# Patient Record
Sex: FEMALE | Race: BLACK OR AFRICAN AMERICAN | Employment: OTHER | URBAN - METROPOLITAN AREA
[De-identification: names, ages, dates, MRNs, and addresses within clinical notes are randomized per-mention and may not be internally consistent; named-entity substitution may affect disease eponyms.]

---

## 2018-05-01 ENCOUNTER — HOSPITAL ENCOUNTER (EMERGENCY)
Age: 83
Discharge: HOME | End: 2018-05-01
Attending: EMERGENCY MEDICINE
Payer: MEDICARE

## 2018-05-01 ENCOUNTER — APPOINTMENT (OUTPATIENT)
Dept: GENERAL RADIOLOGY | Age: 83
End: 2018-05-01
Attending: EMERGENCY MEDICINE
Payer: MEDICARE

## 2018-05-01 VITALS
SYSTOLIC BLOOD PRESSURE: 185 MMHG | WEIGHT: 148 LBS | DIASTOLIC BLOOD PRESSURE: 60 MMHG | RESPIRATION RATE: 20 BRPM | BODY MASS INDEX: 27.07 KG/M2 | HEART RATE: 67 BPM | OXYGEN SATURATION: 100 % | TEMPERATURE: 98.2 F

## 2018-05-01 DIAGNOSIS — R60.0 PEDAL EDEMA: Primary | ICD-10-CM

## 2018-05-01 LAB
ALBUMIN SERPL-MCNC: 3.3 G/DL (ref 3.4–5)
ALBUMIN/GLOB SERPL: 0.7 {RATIO} (ref 0.8–1.7)
ALP SERPL-CCNC: 110 U/L (ref 45–117)
ALT SERPL-CCNC: 31 U/L (ref 13–56)
ANION GAP SERPL CALC-SCNC: 5 MMOL/L (ref 3–18)
AST SERPL-CCNC: 36 U/L (ref 15–37)
BASOPHILS # BLD: 0 K/UL (ref 0–0.06)
BASOPHILS NFR BLD: 0 % (ref 0–2)
BILIRUB SERPL-MCNC: 0.3 MG/DL (ref 0.2–1)
BNP SERPL-MCNC: 2132 PG/ML (ref 0–1800)
BUN SERPL-MCNC: 30 MG/DL (ref 7–18)
BUN/CREAT SERPL: 20 (ref 12–20)
CALCIUM SERPL-MCNC: 8.9 MG/DL (ref 8.5–10.1)
CHLORIDE SERPL-SCNC: 106 MMOL/L (ref 100–108)
CO2 SERPL-SCNC: 29 MMOL/L (ref 21–32)
CREAT SERPL-MCNC: 1.53 MG/DL (ref 0.6–1.3)
DIFFERENTIAL METHOD BLD: ABNORMAL
EOSINOPHIL # BLD: 0.1 K/UL (ref 0–0.4)
EOSINOPHIL NFR BLD: 2 % (ref 0–5)
ERYTHROCYTE [DISTWIDTH] IN BLOOD BY AUTOMATED COUNT: 16.1 % (ref 11.6–14.5)
GLOBULIN SER CALC-MCNC: 4.7 G/DL (ref 2–4)
GLUCOSE SERPL-MCNC: 103 MG/DL (ref 74–99)
HCT VFR BLD AUTO: 32.8 % (ref 35–45)
HGB BLD-MCNC: 10.7 G/DL (ref 12–16)
LYMPHOCYTES # BLD: 1.7 K/UL (ref 0.9–3.6)
LYMPHOCYTES NFR BLD: 24 % (ref 21–52)
MCH RBC QN AUTO: 27 PG (ref 24–34)
MCHC RBC AUTO-ENTMCNC: 32.6 G/DL (ref 31–37)
MCV RBC AUTO: 82.6 FL (ref 74–97)
MONOCYTES # BLD: 0.7 K/UL (ref 0.05–1.2)
MONOCYTES NFR BLD: 10 % (ref 3–10)
NEUTS SEG # BLD: 4.7 K/UL (ref 1.8–8)
NEUTS SEG NFR BLD: 64 % (ref 40–73)
PLATELET # BLD AUTO: 211 K/UL (ref 135–420)
PMV BLD AUTO: 10.1 FL (ref 9.2–11.8)
POTASSIUM SERPL-SCNC: 4.2 MMOL/L (ref 3.5–5.5)
PROT SERPL-MCNC: 8 G/DL (ref 6.4–8.2)
RBC # BLD AUTO: 3.97 M/UL (ref 4.2–5.3)
SODIUM SERPL-SCNC: 140 MMOL/L (ref 136–145)
WBC # BLD AUTO: 7.2 K/UL (ref 4.6–13.2)

## 2018-05-01 PROCEDURE — 85025 COMPLETE CBC W/AUTO DIFF WBC: CPT | Performed by: EMERGENCY MEDICINE

## 2018-05-01 PROCEDURE — 99284 EMERGENCY DEPT VISIT MOD MDM: CPT

## 2018-05-01 PROCEDURE — 93970 EXTREMITY STUDY: CPT

## 2018-05-01 PROCEDURE — 74011250637 HC RX REV CODE- 250/637: Performed by: EMERGENCY MEDICINE

## 2018-05-01 PROCEDURE — 83880 ASSAY OF NATRIURETIC PEPTIDE: CPT | Performed by: EMERGENCY MEDICINE

## 2018-05-01 PROCEDURE — 74011250636 HC RX REV CODE- 250/636: Performed by: EMERGENCY MEDICINE

## 2018-05-01 PROCEDURE — 80053 COMPREHEN METABOLIC PANEL: CPT | Performed by: EMERGENCY MEDICINE

## 2018-05-01 PROCEDURE — 71045 X-RAY EXAM CHEST 1 VIEW: CPT

## 2018-05-01 PROCEDURE — 96374 THER/PROPH/DIAG INJ IV PUSH: CPT

## 2018-05-01 RX ORDER — CARVEDILOL 12.5 MG/1
12.5 TABLET ORAL
Status: COMPLETED | OUTPATIENT
Start: 2018-05-01 | End: 2018-05-01

## 2018-05-01 RX ORDER — GUAIFENESIN 100 MG/5ML
81 LIQUID (ML) ORAL
Status: COMPLETED | OUTPATIENT
Start: 2018-05-01 | End: 2018-05-01

## 2018-05-01 RX ORDER — ACETAMINOPHEN 500 MG
1000 TABLET ORAL
Status: COMPLETED | OUTPATIENT
Start: 2018-05-01 | End: 2018-05-01

## 2018-05-01 RX ORDER — CLOPIDOGREL BISULFATE 75 MG/1
75 TABLET ORAL
Status: COMPLETED | OUTPATIENT
Start: 2018-05-01 | End: 2018-05-01

## 2018-05-01 RX ORDER — FUROSEMIDE 10 MG/ML
40 INJECTION INTRAMUSCULAR; INTRAVENOUS
Status: COMPLETED | OUTPATIENT
Start: 2018-05-01 | End: 2018-05-01

## 2018-05-01 RX ORDER — CLONIDINE HYDROCHLORIDE 0.1 MG/1
0.1 TABLET ORAL
Status: COMPLETED | OUTPATIENT
Start: 2018-05-01 | End: 2018-05-01

## 2018-05-01 RX ADMIN — CLOPIDOGREL BISULFATE 75 MG: 75 TABLET ORAL at 09:29

## 2018-05-01 RX ADMIN — CLONIDINE HYDROCHLORIDE 0.1 MG: 0.1 TABLET ORAL at 09:30

## 2018-05-01 RX ADMIN — FUROSEMIDE 40 MG: 10 INJECTION, SOLUTION INTRAMUSCULAR; INTRAVENOUS at 09:33

## 2018-05-01 RX ADMIN — ACETAMINOPHEN 1000 MG: 500 TABLET, FILM COATED ORAL at 09:30

## 2018-05-01 RX ADMIN — CARVEDILOL 12.5 MG: 12.5 TABLET, FILM COATED ORAL at 09:30

## 2018-05-01 RX ADMIN — ASPIRIN 81 MG 81 MG: 81 TABLET ORAL at 09:30

## 2018-05-01 NOTE — ED PROVIDER NOTES
EMERGENCY DEPARTMENT HISTORY AND PHYSICAL EXAM    9:13 AM      Date: 5/1/2018  Patient Name: Fran Shearer    History of Presenting Illness     Chief Complaint   Patient presents with    Leg Swelling         History Provided By: Patient, Patient's  and EMS    Chief Complaint: Leg Swelling and Pain  Duration:  Days  Timing:  Gradual and Worsening  Location: Bilateral lower extremities  Quality: Aching  Severity: Moderate  Modifying Factors: Made worse with use of compression boots over last ten days. Associated Symptoms: denies any other associated signs or symptoms      Additional History (Context): Fran Shearer is a 80 y.o. 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 Per EMS, patient was given compression boots for swelling 2 years ago, but never used them until 10 days ago. Her BLE pain increased after starting to use the boots. Patient's  reports that patient has been compliant with her medications, but has not yet taken her morning dose of BP medication. Per EMS, patient is ambulatory with a walker and dresses herself at baseline. Patient makes no report of associated chest pain, shortness of breath, nausea, vomiting, diarrhea, recent fever, chills, or diaphoresis. She makes no further complaints. History and ROS limited due to dementia. PCP: Barrie Heimlich, MD    Current Outpatient Prescriptions   Medication Sig Dispense Refill    carvedilol (COREG) 12.5 mg tablet TAKE 1 TABLET TWICE A DAY WITH MEALS 180 Tab 2    NAMENDA 10 mg tablet TAKE 1 TABLET TWICE A  Tab 5    donepezil (ARICEPT) 10 mg tablet TAKE 1 TABLET NIGHTLY 90 Tab 4    HYDROcodone-acetaminophen (NORCO) 5-325 mg per tablet Take 1 Tab by mouth every four (4) hours as needed for Pain.  12 Tab 0  simvastatin (ZOCOR) 20 mg tablet Take 1 Tab by mouth nightly. 90 Tab 3    cloNIDine (CATAPRES) 0.1 mg tablet Take 1 Tab by mouth two (2) times a day. 180 Tab 5    diazepam (VALIUM) 5 mg tablet Take 1 Tab by mouth every six (6) hours as needed. 60 Tab 5    furosemide (LASIX) 40 mg tablet Take 1 Tab by mouth daily. 90 Tab 5    memantine (NAMENDA) 10 mg tablet Take 1 Tab by mouth two (2) times a day. 180 Tab 1    clopidogrel (PLAVIX) 75 mg tablet Take 1 by mouth Daily 90 Tab 4    felodipine (PLENDIL SR) 10 mg 24 hr tablet Take 1 Tab by mouth daily. 180 Tab 5    aspirin delayed-release 81 mg tablet Take  by mouth daily. Past History     Past Medical History:  Past Medical History:   Diagnosis Date    Anemia NEC     Anxiety     Cataract     bilat    Colon cancer ([facility] [facility] 1999    CRI (chronic renal insufficiency) 4/07    CVA (cerebral infarction) ([facility] [facility] 4/01    right facial droop    CVA (cerebral infarction) ([facility] [facility] 2/11    left post corona radiata    Dementia     Diverticulosis     Edema     GERD (gastroesophageal reflux disease)     Hypercholesterolemia     Hypertension     Hypomagnesemia 2/11    Lipoma of neck     right    Orthostatic hypotension     RBBB (right bundle branch block)     Syncope 6/08    TIA (transient ischemic attack) mid 1990's       Past Surgical History:  Past Surgical History:   Procedure Laterality Date    ENDOSCOPY, COLON, DIAGNOSTIC  2006    HX BREAST BIOPSY      HX CATARACT REMOVAL  10/02    left    HX CATARACT REMOVAL  6/03    right    HX COLECTOMY  1999    right hemicolectomy    HX HYSTERECTOMY      HX LIPOMA RESECTION         Family History:  No family history on file.     Social History:  Social History   Substance Use Topics    Smoking status: Never Smoker    Smokeless tobacco: Not on file    Alcohol use No       Allergies:  No Known Allergies      Review of Systems       Review of Systems   Unable to perform ROS: Dementia   Constitutional: Negative for chills and fever. Respiratory: Negative for shortness of breath. Cardiovascular: Positive for leg swelling (bilateral). Negative for chest pain. Gastrointestinal: Negative for diarrhea, nausea and vomiting. All other systems reviewed and are negative. Physical Exam     Visit Vitals    /60 (BP 1 Location: Left arm)    Pulse 67    Temp 98.2 °F (36.8 °C)    Resp 20    Wt 67.1 kg (148 lb)    SpO2 100%    BMI 27.07 kg/m2         Physical Exam   Constitutional: She appears well-developed and well-nourished. No distress. HENT:   Head: Normocephalic and atraumatic. Eyes: Conjunctivae and EOM are normal. Right eye exhibits no discharge. Left eye exhibits no discharge. No scleral icterus. Neck: Normal range of motion. Neck supple. No tracheal deviation present. Cardiovascular: Normal rate, regular rhythm and normal heart sounds. No murmur heard. Bilateral lower extremity edema, R > L with glossy, red skin. Pulmonary/Chest: Effort normal and breath sounds normal. No respiratory distress. She has no wheezes. She has no rales. Abdominal: Soft. She exhibits no distension. There is no tenderness. There is no rebound and no guarding. Musculoskeletal: Normal range of motion. She exhibits no edema or deformity. Neurological: She is alert. No cranial nerve deficit. Skin: Skin is warm and dry. She is not diaphoretic. Psychiatric: She has a normal mood and affect.  Her behavior is normal. Judgment and thought content normal.         Diagnostic Study Results     Labs -  Recent Results (from the past 12 hour(s))   METABOLIC PANEL, COMPREHENSIVE    Collection Time: 05/01/18  9:23 AM   Result Value Ref Range    Sodium 140 136 - 145 mmol/L    Potassium 4.2 3.5 - 5.5 mmol/L    Chloride 106 100 - 108 mmol/L    CO2 29 21 - 32 mmol/L    Anion gap 5 3.0 - 18 mmol/L    Glucose 103 (H) 74 - 99 mg/dL    BUN 30 (H) 7.0 - 18 MG/DL    Creatinine 1.53 (H) 0.6 - 1.3 MG/DL    BUN/Creatinine ratio 20 12 - 20      GFR est AA 38 (L) >60 ml/min/1.73m2    GFR est non-AA 32 (L) >60 ml/min/1.73m2    Calcium 8.9 8.5 - 10.1 MG/DL    Bilirubin, total 0.3 0.2 - 1.0 MG/DL    ALT (SGPT) 31 13 - 56 U/L    AST (SGOT) 36 15 - 37 U/L    Alk. phosphatase 110 45 - 117 U/L    Protein, total 8.0 6.4 - 8.2 g/dL    Albumin 3.3 (L) 3.4 - 5.0 g/dL    Globulin 4.7 (H) 2.0 - 4.0 g/dL    A-G Ratio 0.7 (L) 0.8 - 1.7     CBC WITH AUTOMATED DIFF    Collection Time: 05/01/18  9:23 AM   Result Value Ref Range    WBC 7.2 4.6 - 13.2 K/uL    RBC 3.97 (L) 4.20 - 5.30 M/uL    HGB 10.7 (L) 12.0 - 16.0 g/dL    HCT 32.8 (L) 35.0 - 45.0 %    MCV 82.6 74.0 - 97.0 FL    MCH 27.0 24.0 - 34.0 PG    MCHC 32.6 31.0 - 37.0 g/dL    RDW 16.1 (H) 11.6 - 14.5 %    PLATELET 971 296 - 589 K/uL    MPV 10.1 9.2 - 11.8 FL    NEUTROPHILS 64 40 - 73 %    LYMPHOCYTES 24 21 - 52 %    MONOCYTES 10 3 - 10 %    EOSINOPHILS 2 0 - 5 %    BASOPHILS 0 0 - 2 %    ABS. NEUTROPHILS 4.7 1.8 - 8.0 K/UL    ABS. LYMPHOCYTES 1.7 0.9 - 3.6 K/UL    ABS. MONOCYTES 0.7 0.05 - 1.2 K/UL    ABS. EOSINOPHILS 0.1 0.0 - 0.4 K/UL    ABS. BASOPHILS 0.0 0.0 - 0.06 K/UL    DF AUTOMATED     NT-PRO BNP    Collection Time: 05/01/18  9:23 AM   Result Value Ref Range    NT pro-BNP 2132 (H) 0 - 1800 PG/ML       Radiologic Studies -   DUPLEX LOWER EXT VENOUS BILAT         XR CHEST SNGL V    (Results Pending)     BLE Duplex (Preliminary Read by Sapna Andrade):  Right Leg:-  Deep venous thrombosis:           No  Superficial venous thrombosis:    No  Deep venous insufficiency:        Not examined  Superficial venous insufficiency: Not examined     Left Leg:-  Deep venous thrombosis:           No  Superficial venous thrombosis:    No  Deep venous insufficiency:        Not examined  Superficial venous insufficiency: Not examined    Medical Decision Making   I am the first provider for this patient.     I reviewed the vital signs, available nursing notes, past medical history, past surgical history, family history and social history. Vital Signs-Reviewed the patient's vital signs. Pulse Oximetry Analysis -  100% on room air    Records Reviewed: Nursing Notes, Old Medical Records and Previous Radiology Studies (Time of Review: 9:13 AM)    ED Course: Progress Notes, Reevaluation, and Consults:  Re-evaluated patient. She states that she is feeling better. Ready for discharge. 12:42 PM     Provider notes (Medical Decision Making):  Patient with chronic venous insufficiency presents with painful leg swelling. Improved after IV Lasix. Will discharge and have patient resume her PO Lasix. No DVT on US. Diagnosis     Clinical Impression:   1. Pedal edema        Disposition: Discharge    Follow-up Information     Follow up With Details Comments 2008 Nine MD Juan Schedule an appointment as soon as possible for a visit  [address] [place], [place] [address] [address] [place] [facility] EMERGENCY DEPT  If symptoms worsen [address] [address]  [phone]           Patient's Medications   Start Taking    No medications on file   Continue Taking    ASPIRIN DELAYED-RELEASE 81 MG TABLET    Take  by mouth daily. CARVEDILOL (COREG) 12.5 MG TABLET    TAKE 1 TABLET TWICE A DAY WITH MEALS    CLONIDINE (CATAPRES) 0.1 MG TABLET    Take 1 Tab by mouth two (2) times a day. CLOPIDOGREL (PLAVIX) 75 MG TABLET    Take 1 by mouth Daily    DIAZEPAM (VALIUM) 5 MG TABLET    Take 1 Tab by mouth every six (6) hours as needed. DONEPEZIL (ARICEPT) 10 MG TABLET    TAKE 1 TABLET NIGHTLY    FELODIPINE (PLENDIL SR) 10 MG 24 HR TABLET    Take 1 Tab by mouth daily. FUROSEMIDE (LASIX) 40 MG TABLET    Take 1 Tab by mouth daily. HYDROCODONE-ACETAMINOPHEN (NORCO) 5-325 MG PER TABLET    Take 1 Tab by mouth every four (4) hours as needed for Pain. MEMANTINE (NAMENDA) 10 MG TABLET    Take 1 Tab by mouth two (2) times a day.     NAMENDA 10 MG TABLET    TAKE 1 TABLET TWICE A DAY SIMVASTATIN (ZOCOR) 20 MG TABLET    Take 1 Tab by mouth nightly. These Medications have changed    No medications on file   Stop Taking    No medications on file     _______________________________    Scribe Attestation:     Tanner Noble, acting as a scribe for and in the presence of Aparna Benavides MD      May 01, 2018 at 9:13 AM       Provider Attestation:      I personally performed the services described in the documentation, reviewed the documentation, as recorded by the scribe in my presence, and it accurately and completely records my words and actions.  May 01, 2018 at 9:13 AM - Aparna Benavides MD      _______________________________

## 2018-05-01 NOTE — PROCEDURES
[facility]  *** FINAL REPORT ***    Name: Bony Conrad  MRN: [id number]  : 16 Sep 1922  HIS Order #: [id number]  [address] [facility] Visit #: [id number]  Date: 01 May 2018    TYPE OF TEST: Peripheral Venous Testing    REASON FOR TEST  Pain in limb, Limb swelling    Right Leg:-  Deep venous thrombosis:           No  Superficial venous thrombosis:    No  Deep venous insufficiency:        Not examined  Superficial venous insufficiency: Not examined    Left Leg:-  Deep venous thrombosis:           No  Superficial venous thrombosis:    No  Deep venous insufficiency:        Not examined  Superficial venous insufficiency: Not examined      INTERPRETATION/FINDINGS  Duplex images were obtained using 2-D gray scale, color flow, and  spectral Doppler analysis. Right leg :  1. Deep veins visualized include the common femoral, femoral,  popliteal, posterior tibial and peroneal veins. 2. No evidence of deep venous thrombosis detected in the veins  visualized. 3. Superficial veins visualized include the great saphenous vein. 4. No evidence of superficial thrombosis detected. 5. Monophasic flow in the posterior tibial artery. Left leg :  1. Deep veins visualized include the common femoral, femoral,  popliteal, posterior tibial and peroneal veins. 2. No evidence of deep venous thrombosis detected in the veins  visualized. 3. Superficial veins visualized include the great saphenous vein. 4. No evidence of superficial thrombosis detected. 5. Monophasic flow in the posterior tibial artery. ADDITIONAL COMMENTS    I have personally reviewed the data relevant to the interpretation of  this  study.     TECHNOLOGIST: Mraiana Vargas, [address] [place] [address]  Signed: 2018 12:06 PM    PHYSICIAN: Jb Joseph MD  Signed: 2018 04:22 PM

## 2018-05-01 NOTE — DISCHARGE INSTRUCTIONS
Leg and Ankle Edema: Care Instructions  Your Care Instructions  Swelling in the legs, ankles, and feet is called edema. It is common after you sit or stand for a while. Long plane flights or car rides often cause swelling in the legs and feet. You may also have swelling if you have to stand for long periods of time at your job. Problems with the veins in the legs (varicose veins) and changes in hormones can also cause swelling. Sometimes the swelling in the ankles and feet is caused by a more serious problem, such as heart failure, infection, blood clots, or liver or kidney disease. Follow-up care is a key part of your treatment and safety. Be sure to make and go to all appointments, and call your doctor if you are having problems. It's also a good idea to know your test results and keep a list of the medicines you take. How can you care for yourself at home? · If your doctor gave you medicine, take it as prescribed. Call your doctor if you think you are having a problem with your medicine. · Whenever you are resting, raise your legs up. Try to keep the swollen area higher than the level of your heart. · Take breaks from standing or sitting in one position. ¨ Walk around to increase the blood flow in your lower legs. ¨ Move your feet and ankles often while you stand, or tighten and relax your leg muscles. · Wear support stockings. Put them on in the morning, before swelling gets worse. · Eat a balanced diet. Lose weight if you need to. · Limit the amount of salt (sodium) in your diet. Salt holds fluid in the body and may increase swelling. When should you call for help? Call 911 anytime you think you may need emergency care. For example, call if:  ? · You have symptoms of a blood clot in your lung (called a pulmonary embolism). These may include:  ¨ Sudden chest pain. ¨ Trouble breathing. ¨ Coughing up blood.    ?Call your doctor now or seek immediate medical care if:  ? · You have signs of a blood clot, such as:  ¨ Pain in your calf, back of the knee, thigh, or groin. ¨ Redness and swelling in your leg or groin. ? · You have symptoms of infection, such as:  ¨ Increased pain, swelling, warmth, or redness. ¨ Red streaks or pus. ¨ A fever. ? Watch closely for changes in your health, and be sure to contact your doctor if:  ? · Your swelling is getting worse. ? · You have new or worsening pain in your legs. ? · You do not get better as expected. Where can you learn more? Go to http://mónica-shirin.info/. Enter C359 in the search box to learn more about \"Leg and Ankle Edema: Care Instructions. \"  Current as of: March 20, 2017  Content Version: 11.4  © 2285-4509 [employer]. Care instructions adapted under license by [employer] (which disclaims liability or warranty for this information). If you have questions about a medical condition or this instruction, always ask your healthcare professional. [place] [address] any warranty or liability for your use of this information.

## 2019-01-01 ENCOUNTER — PATIENT OUTREACH (OUTPATIENT)
Dept: CARDIOLOGY CLINIC | Age: 84
End: 2019-01-01

## 2019-01-01 ENCOUNTER — TELEPHONE (OUTPATIENT)
Dept: CARDIOLOGY CLINIC | Age: 84
End: 2019-01-01

## 2019-01-01 ENCOUNTER — HOSPITAL ENCOUNTER (OUTPATIENT)
Dept: LAB | Age: 84
Discharge: HOME | End: 2019-12-02
Payer: MEDICARE

## 2019-01-01 ENCOUNTER — OFFICE VISIT (OUTPATIENT)
Dept: CARDIOLOGY CLINIC | Age: 84
End: 2019-01-01

## 2019-01-01 ENCOUNTER — HOSPITAL ENCOUNTER (OUTPATIENT)
Dept: GENERAL RADIOLOGY | Age: 84
Discharge: HOME | End: 2019-12-02
Payer: MEDICARE

## 2019-01-01 VITALS
DIASTOLIC BLOOD PRESSURE: 64 MMHG | OXYGEN SATURATION: 98 % | RESPIRATION RATE: 16 BRPM | HEART RATE: 56 BPM | HEIGHT: 59 IN | SYSTOLIC BLOOD PRESSURE: 122 MMHG | WEIGHT: 127 LBS | BODY MASS INDEX: 25.6 KG/M2

## 2019-01-01 VITALS
HEIGHT: 59 IN | SYSTOLIC BLOOD PRESSURE: 142 MMHG | BODY MASS INDEX: 24.6 KG/M2 | OXYGEN SATURATION: 98 % | WEIGHT: 122 LBS | DIASTOLIC BLOOD PRESSURE: 58 MMHG | HEART RATE: 52 BPM

## 2019-01-01 DIAGNOSIS — I50.9 ACUTE CONGESTIVE HEART FAILURE, UNSPECIFIED HEART FAILURE TYPE (HCC): ICD-10-CM

## 2019-01-01 DIAGNOSIS — R06.02 SOB (SHORTNESS OF BREATH): ICD-10-CM

## 2019-01-01 DIAGNOSIS — I50.32 DIASTOLIC CHF, CHRONIC (HCC): ICD-10-CM

## 2019-01-01 DIAGNOSIS — I10 ESSENTIAL HYPERTENSION: Primary | ICD-10-CM

## 2019-01-01 DIAGNOSIS — I50.9 ACUTE CONGESTIVE HEART FAILURE, UNSPECIFIED HEART FAILURE TYPE (HCC): Primary | ICD-10-CM

## 2019-01-01 DIAGNOSIS — E78.5 DYSLIPIDEMIA: ICD-10-CM

## 2019-01-01 LAB
ANION GAP SERPL CALC-SCNC: 8 MMOL/L (ref 3–18)
BNP SERPL-MCNC: ABNORMAL PG/ML (ref 0–1800)
BUN SERPL-MCNC: 32 MG/DL (ref 7–18)
BUN/CREAT SERPL: 14 (ref 12–20)
CALCIUM SERPL-MCNC: 8.9 MG/DL (ref 8.5–10.1)
CHLORIDE SERPL-SCNC: 105 MMOL/L (ref 100–111)
CO2 SERPL-SCNC: 28 MMOL/L (ref 21–32)
CREAT SERPL-MCNC: 2.3 MG/DL (ref 0.6–1.3)
GLUCOSE SERPL-MCNC: 112 MG/DL (ref 74–99)
POTASSIUM SERPL-SCNC: 4.8 MMOL/L (ref 3.5–5.5)
SODIUM SERPL-SCNC: 141 MMOL/L (ref 136–145)

## 2019-01-01 PROCEDURE — 80048 BASIC METABOLIC PNL TOTAL CA: CPT

## 2019-01-01 PROCEDURE — 83880 ASSAY OF NATRIURETIC PEPTIDE: CPT

## 2019-01-01 PROCEDURE — 71046 X-RAY EXAM CHEST 2 VIEWS: CPT

## 2019-01-01 PROCEDURE — 36415 COLL VENOUS BLD VENIPUNCTURE: CPT

## 2019-01-01 RX ORDER — DIAZEPAM 5 MG/1
2.5 TABLET ORAL
COMMUNITY
End: 2020-01-01

## 2019-01-01 RX ORDER — ERGOCALCIFEROL 1.25 MG/1
50000 CAPSULE ORAL
Refills: 5 | COMMUNITY
Start: 2019-01-01

## 2019-01-01 RX ORDER — MENTHOL
1000 GEL (GRAM) TOPICAL DAILY
COMMUNITY

## 2019-06-05 ENCOUNTER — HOSPITAL ENCOUNTER (INPATIENT)
Age: 84
LOS: 6 days | Discharge: HOME HEALTH | DRG: 291 | End: 2019-06-11
Attending: EMERGENCY MEDICINE | Admitting: HOSPITALIST
Payer: MEDICARE

## 2019-06-05 ENCOUNTER — APPOINTMENT (OUTPATIENT)
Dept: GENERAL RADIOLOGY | Age: 84
DRG: 291 | End: 2019-06-05
Attending: EMERGENCY MEDICINE
Payer: MEDICARE

## 2019-06-05 DIAGNOSIS — N30.00 ACUTE CYSTITIS WITHOUT HEMATURIA: ICD-10-CM

## 2019-06-05 DIAGNOSIS — I50.9 ACUTE CONGESTIVE HEART FAILURE, UNSPECIFIED HEART FAILURE TYPE (HCC): Primary | ICD-10-CM

## 2019-06-05 DIAGNOSIS — Z71.89 ADVANCED CARE PLANNING/COUNSELING DISCUSSION: ICD-10-CM

## 2019-06-05 DIAGNOSIS — J96.01 ACUTE RESPIRATORY FAILURE WITH HYPOXIA (HCC): ICD-10-CM

## 2019-06-05 LAB
ANION GAP SERPL CALC-SCNC: 7 MMOL/L (ref 3–18)
ATRIAL RATE: 95 BPM
BASOPHILS # BLD: 0 K/UL (ref 0–0.1)
BASOPHILS NFR BLD: 0 % (ref 0–2)
BNP SERPL-MCNC: ABNORMAL PG/ML (ref 0–1800)
BUN SERPL-MCNC: 39 MG/DL (ref 7–18)
BUN/CREAT SERPL: 21 (ref 12–20)
CALCIUM SERPL-MCNC: 9.4 MG/DL (ref 8.5–10.1)
CALCULATED P AXIS, ECG09: 66 DEGREES
CALCULATED R AXIS, ECG10: 97 DEGREES
CALCULATED T AXIS, ECG11: -27 DEGREES
CHLORIDE SERPL-SCNC: 108 MMOL/L (ref 100–108)
CK MB CFR SERPL CALC: 2.9 % (ref 0–4)
CK MB SERPL-MCNC: 2 NG/ML (ref 5–25)
CK SERPL-CCNC: 69 U/L (ref 26–192)
CO2 SERPL-SCNC: 29 MMOL/L (ref 21–32)
CREAT SERPL-MCNC: 1.9 MG/DL (ref 0.6–1.3)
D DIMER PPP FEU-MCNC: 2.89 UG/ML(FEU)
DIAGNOSIS, 93000: NORMAL
DIFFERENTIAL METHOD BLD: ABNORMAL
EOSINOPHIL # BLD: 0.1 K/UL (ref 0–0.4)
EOSINOPHIL NFR BLD: 1 % (ref 0–5)
ERYTHROCYTE [DISTWIDTH] IN BLOOD BY AUTOMATED COUNT: 17.5 % (ref 11.6–14.5)
GLUCOSE SERPL-MCNC: 171 MG/DL (ref 74–99)
HCT VFR BLD AUTO: 31.2 % (ref 35–45)
HGB BLD-MCNC: 9.8 G/DL (ref 12–16)
LYMPHOCYTES # BLD: 1.5 K/UL (ref 0.9–3.6)
LYMPHOCYTES NFR BLD: 12 % (ref 21–52)
MCH RBC QN AUTO: 27.5 PG (ref 24–34)
MCHC RBC AUTO-ENTMCNC: 31.4 G/DL (ref 31–37)
MCV RBC AUTO: 87.4 FL (ref 74–97)
MONOCYTES # BLD: 1 K/UL (ref 0.05–1.2)
MONOCYTES NFR BLD: 8 % (ref 3–10)
NEUTS SEG # BLD: 9.2 K/UL (ref 1.8–8)
NEUTS SEG NFR BLD: 79 % (ref 40–73)
P-R INTERVAL, ECG05: 138 MS
PLATELET # BLD AUTO: 192 K/UL (ref 135–420)
PMV BLD AUTO: 11 FL (ref 9.2–11.8)
POTASSIUM SERPL-SCNC: 4.8 MMOL/L (ref 3.5–5.5)
Q-T INTERVAL, ECG07: 386 MS
QRS DURATION, ECG06: 106 MS
QTC CALCULATION (BEZET), ECG08: 485 MS
RBC # BLD AUTO: 3.57 M/UL (ref 4.2–5.3)
SODIUM SERPL-SCNC: 144 MMOL/L (ref 136–145)
TROPONIN I SERPL-MCNC: 0.43 NG/ML (ref 0–0.04)
VENTRICULAR RATE, ECG03: 95 BPM
WBC # BLD AUTO: 11.7 K/UL (ref 4.6–13.2)

## 2019-06-05 PROCEDURE — 94762 N-INVAS EAR/PLS OXIMTRY CONT: CPT

## 2019-06-05 PROCEDURE — 77030005514 HC CATH URETH FOL14 BARD -A

## 2019-06-05 PROCEDURE — 65660000004 HC RM CVT STEPDOWN

## 2019-06-05 PROCEDURE — 93005 ELECTROCARDIOGRAM TRACING: CPT

## 2019-06-05 PROCEDURE — 74011250636 HC RX REV CODE- 250/636: Performed by: EMERGENCY MEDICINE

## 2019-06-05 PROCEDURE — 85025 COMPLETE CBC W/AUTO DIFF WBC: CPT

## 2019-06-05 PROCEDURE — 96374 THER/PROPH/DIAG INJ IV PUSH: CPT

## 2019-06-05 PROCEDURE — 82550 ASSAY OF CK (CPK): CPT

## 2019-06-05 PROCEDURE — 74011250637 HC RX REV CODE- 250/637: Performed by: HOSPITALIST

## 2019-06-05 PROCEDURE — 99285 EMERGENCY DEPT VISIT HI MDM: CPT

## 2019-06-05 PROCEDURE — 77030037878 HC DRSG MEPILEX >48IN BORD MOLN -B

## 2019-06-05 PROCEDURE — 85379 FIBRIN DEGRADATION QUANT: CPT

## 2019-06-05 PROCEDURE — 77030013033 HC MSK BPAP/CPAP MMKA -B

## 2019-06-05 PROCEDURE — 74011250636 HC RX REV CODE- 250/636: Performed by: NURSE PRACTITIONER

## 2019-06-05 PROCEDURE — 71045 X-RAY EXAM CHEST 1 VIEW: CPT

## 2019-06-05 PROCEDURE — 94660 CPAP INITIATION&MGMT: CPT

## 2019-06-05 PROCEDURE — 92950 HEART/LUNG RESUSCITATION CPR: CPT

## 2019-06-05 PROCEDURE — 5A09457 ASSISTANCE WITH RESPIRATORY VENTILATION, 24-96 CONSECUTIVE HOURS, CONTINUOUS POSITIVE AIRWAY PRESSURE: ICD-10-PCS | Performed by: HOSPITALIST

## 2019-06-05 PROCEDURE — 83880 ASSAY OF NATRIURETIC PEPTIDE: CPT

## 2019-06-05 PROCEDURE — 74011250636 HC RX REV CODE- 250/636: Performed by: HOSPITALIST

## 2019-06-05 PROCEDURE — 77030020186 HC BOOT HL PROTCT SAGE -B

## 2019-06-05 PROCEDURE — 94761 N-INVAS EAR/PLS OXIMETRY MLT: CPT

## 2019-06-05 PROCEDURE — 80048 BASIC METABOLIC PNL TOTAL CA: CPT

## 2019-06-05 RX ORDER — MEMANTINE HYDROCHLORIDE 10 MG/1
10 TABLET ORAL DAILY
Status: DISCONTINUED | OUTPATIENT
Start: 2019-06-06 | End: 2019-06-07

## 2019-06-05 RX ORDER — LORATADINE 10 MG/1
10 TABLET ORAL DAILY
Status: DISCONTINUED | OUTPATIENT
Start: 2019-06-06 | End: 2019-06-06

## 2019-06-05 RX ORDER — DONEPEZIL HYDROCHLORIDE 10 MG/1
10 TABLET, FILM COATED ORAL
COMMUNITY
End: 2020-01-01

## 2019-06-05 RX ORDER — ACETAMINOPHEN 325 MG/1
650 TABLET ORAL
Status: DISCONTINUED | OUTPATIENT
Start: 2019-06-05 | End: 2019-06-11 | Stop reason: HOSPADM

## 2019-06-05 RX ORDER — CLOPIDOGREL BISULFATE 75 MG/1
75 TABLET ORAL DAILY
Status: DISCONTINUED | OUTPATIENT
Start: 2019-06-06 | End: 2019-06-11 | Stop reason: HOSPADM

## 2019-06-05 RX ORDER — LANOLIN ALCOHOL/MO/W.PET/CERES
325 CREAM (GRAM) TOPICAL
Status: DISCONTINUED | OUTPATIENT
Start: 2019-06-06 | End: 2019-06-11 | Stop reason: HOSPADM

## 2019-06-05 RX ORDER — SODIUM CHLORIDE 0.9 % (FLUSH) 0.9 %
5-40 SYRINGE (ML) INJECTION EVERY 8 HOURS
Status: DISCONTINUED | OUTPATIENT
Start: 2019-06-05 | End: 2019-06-11 | Stop reason: HOSPADM

## 2019-06-05 RX ORDER — FUROSEMIDE 10 MG/ML
40 INJECTION INTRAMUSCULAR; INTRAVENOUS
Status: COMPLETED | OUTPATIENT
Start: 2019-06-05 | End: 2019-06-05

## 2019-06-05 RX ORDER — SIMVASTATIN 20 MG/1
20 TABLET, FILM COATED ORAL
Status: DISCONTINUED | OUTPATIENT
Start: 2019-06-05 | End: 2019-06-11 | Stop reason: HOSPADM

## 2019-06-05 RX ORDER — DONEPEZIL HYDROCHLORIDE 5 MG/1
10 TABLET, FILM COATED ORAL
Status: DISCONTINUED | OUTPATIENT
Start: 2019-06-05 | End: 2019-06-07

## 2019-06-05 RX ORDER — ONDANSETRON 2 MG/ML
4 INJECTION INTRAMUSCULAR; INTRAVENOUS
Status: DISCONTINUED | OUTPATIENT
Start: 2019-06-05 | End: 2019-06-11 | Stop reason: HOSPADM

## 2019-06-05 RX ORDER — LORATADINE 10 MG/1
10 TABLET ORAL
Status: ON HOLD | COMMUNITY
End: 2019-06-05

## 2019-06-05 RX ORDER — FELODIPINE 5 MG/1
10 TABLET, EXTENDED RELEASE ORAL DAILY
Status: DISCONTINUED | OUTPATIENT
Start: 2019-06-06 | End: 2019-06-11 | Stop reason: HOSPADM

## 2019-06-05 RX ORDER — FUROSEMIDE 10 MG/ML
40 INJECTION INTRAMUSCULAR; INTRAVENOUS DAILY
Status: DISCONTINUED | OUTPATIENT
Start: 2019-06-06 | End: 2019-06-05

## 2019-06-05 RX ORDER — CLONIDINE HYDROCHLORIDE 0.1 MG/1
0.1 TABLET ORAL 2 TIMES DAILY
Status: DISCONTINUED | OUTPATIENT
Start: 2019-06-05 | End: 2019-06-11

## 2019-06-05 RX ORDER — ASPIRIN 81 MG/1
81 TABLET ORAL DAILY
Status: DISCONTINUED | OUTPATIENT
Start: 2019-06-06 | End: 2019-06-11 | Stop reason: HOSPADM

## 2019-06-05 RX ORDER — HEPARIN SODIUM 5000 [USP'U]/ML
5000 INJECTION, SOLUTION INTRAVENOUS; SUBCUTANEOUS EVERY 8 HOURS
Status: DISCONTINUED | OUTPATIENT
Start: 2019-06-05 | End: 2019-06-11 | Stop reason: HOSPADM

## 2019-06-05 RX ORDER — CARVEDILOL 12.5 MG/1
12.5 TABLET ORAL 2 TIMES DAILY WITH MEALS
Status: DISCONTINUED | OUTPATIENT
Start: 2019-06-06 | End: 2019-06-11 | Stop reason: HOSPADM

## 2019-06-05 RX ORDER — LANOLIN ALCOHOL/MO/W.PET/CERES
325 CREAM (GRAM) TOPICAL
COMMUNITY
End: 2019-01-01 | Stop reason: SDUPTHER

## 2019-06-05 RX ORDER — FUROSEMIDE 10 MG/ML
40 INJECTION INTRAMUSCULAR; INTRAVENOUS 2 TIMES DAILY
Status: DISCONTINUED | OUTPATIENT
Start: 2019-06-05 | End: 2019-06-07

## 2019-06-05 RX ORDER — SODIUM CHLORIDE 0.9 % (FLUSH) 0.9 %
5-40 SYRINGE (ML) INJECTION AS NEEDED
Status: DISCONTINUED | OUTPATIENT
Start: 2019-06-05 | End: 2019-06-11 | Stop reason: HOSPADM

## 2019-06-05 RX ADMIN — CLONIDINE HYDROCHLORIDE 0.1 MG: 0.1 TABLET ORAL at 23:27

## 2019-06-05 RX ADMIN — HEPARIN SODIUM 5000 UNITS: 5000 INJECTION INTRAVENOUS; SUBCUTANEOUS at 23:27

## 2019-06-05 RX ADMIN — FUROSEMIDE 40 MG: 10 INJECTION, SOLUTION INTRAMUSCULAR; INTRAVENOUS at 12:12

## 2019-06-05 RX ADMIN — FUROSEMIDE 40 MG: 10 INJECTION, SOLUTION INTRAMUSCULAR; INTRAVENOUS at 18:00

## 2019-06-05 RX ADMIN — DONEPEZIL HYDROCHLORIDE 10 MG: 5 TABLET, FILM COATED ORAL at 23:27

## 2019-06-05 RX ADMIN — Medication 10 ML: at 23:28

## 2019-06-05 RX ADMIN — SIMVASTATIN 20 MG: 20 TABLET, FILM COATED ORAL at 23:27

## 2019-06-05 NOTE — PROGRESS NOTES
Spoke with son Shanice Chen - would like pt DNR - says he has had these conversations with her in the past & she did not want anything done if it came to that   Order placed

## 2019-06-05 NOTE — H&P
HISTORY & PHYSICAL            Patient: Devin Rodriguez MRN: [id number]  CSN: [id number]    YOB: 1922  Age: 80 y.o. Sex: female    DOA: 6/5/2019 LOS:  LOS: 0 days        DOA: 6/5/2019        Assessment/Plan     Active Problems:    Acute CHF (congestive heart failure) ([facility] [address] (6/5/2019)      CHF exacerbation ([facility] [address] (6/5/2019)        Plan:  1. CHF exac - Likely Acute on chronic combined CHF  - repeat Echo , serial enzymes, Cardiology consult - Diurese with IV lasix   2. Acute Hypoxic Resp failure - likely from #1 - currently BiPAP  3. Elevated Trop - likely in the setting of demand ischemia   4. HTN - resume home meds , monitor BP   5. H/o CAD  6. Chronic anemia - monitor H&H   7. CKD 3 - monitor creatinine since pt is being diuresed   8. H/o CVA   9. Generalized debility   10.  Moderate malnutrition - Nutrition consult   DVT Px - Heparin   FC     Tried to reach her son Julita Rocha  - POA for decision making process - left him a VM          Severity of Signs & Symptoms -- Moderate   Risk of adverse events -- High   Current Medical Rx Plan - As Above   Patient history & comorbidities - Per HPI  Discharge Plan -- SNF  Risk for Readmission -- High         HPI:     Devin Rodriguez is a 80 y.o. female who is being admitted for Acute Resp failure 2y to CHF exac - pt is unable to give me much info - caregivers by her bedside mention that she was SOB this AM   They mention that she has been taking her meds   PMHx - CHF , CAD, CKD3, HTN   ER eval - pt found to have elevated BNP with SOB - likely from CHF exac & mild elevation in trop - likely cardiac strain   Cardiology consulted by ER   Pt given IV lasix, will get serial enzymes & Echo - likely conservative mx given her age   Will admit for further eval     Past Medical History:   Diagnosis Date    Anemia NEC     Anxiety     Cataract     bilat    Colon cancer ([facility] [address] 1999    CRI (chronic renal insufficiency) 4/07    CVA (cerebral infarction) ([facility] [address] 4/01    right facial droop    CVA (cerebral infarction) ([facility] [address] 2/11    left post corona radiata    Dementia     Diverticulosis     Edema     GERD (gastroesophageal reflux disease)     Hypercholesterolemia     Hypertension     Hypomagnesemia 2/11    Lipoma of neck     right    Orthostatic hypotension     RBBB (right bundle branch block)     Syncope 6/08    TIA (transient ischemic attack) mid 1990's       Past Surgical History:   Procedure Laterality Date    ENDOSCOPY, COLON, DIAGNOSTIC  2006    HX BREAST BIOPSY      HX CATARACT REMOVAL  10/02    left    HX CATARACT REMOVAL  6/03    right    HX COLECTOMY  1999    right hemicolectomy    HX HYSTERECTOMY      HX LIPOMA RESECTION         No family history on file. Social History     Socioeconomic History    Marital status:      Spouse name: Not on file    Number of children: Not on file    Years of education: Not on file    Highest education level: Not on file   Tobacco Use    Smoking status: Never Smoker   Substance and Sexual Activity    Alcohol use: No    Sexual activity: Never       Prior to Admission medications    Medication Sig Start Date End Date Taking? Authorizing Provider   ferrous sulfate 325 mg (65 mg iron) tablet Take 325 mg by mouth Daily (before breakfast). Yes Other, MD Hamzah   multivitamin, tx-iron-ca-min (THERA-M W/ IRON) 9 mg iron-400 mcg tab tablet Take 1 Tab by mouth daily. Yes Other, MD Hamzah   loratadine (CLARITIN) 10 mg tablet Take 10 mg by mouth. Yes Other, MD Hamzah   donepezil (ARICEPT) 10 mg tablet Take 10 mg by mouth nightly. Yes Other, MD Hamzah   carvedilol (COREG) 12.5 mg tablet TAKE 1 TABLET TWICE A DAY WITH MEALS 3/12/14   Rama Carlton MD   NAMENDA 10 mg tablet TAKE 1 TABLET TWICE A DAY 6/10/13   Maurisio Orozco MD   HYDROcodone-acetaminophen [facility]) 5-325 mg per tablet Take 1 Tab by mouth every four (4) hours as needed for Pain.  5/8/13   Royce Gregorio MD   simvastatin (ZOCOR) 20 mg tablet Take 1 Tab by mouth nightly. 4/23/13   Denise Allen MD   cloNIDine (CATAPRES) 0.1 mg tablet Take 1 Tab by mouth two (2) times a day. 2/6/13   Denise Allen MD   diazepam (VALIUM) 5 mg tablet Take 1 Tab by mouth every six (6) hours as needed. 1/18/13   Denise Allen MD   furosemide (LASIX) 40 mg tablet Take 1 Tab by mouth daily. 9/12/12   Denise Allen MD   clopidogrel (PLAVIX) 75 mg tablet Take 1 by mouth Daily 8/28/12   Denise Allen MD   felodipine (PLENDIL SR) 10 mg 24 hr tablet Take 1 Tab by mouth daily. 8/3/12   Humaira Espinosa MD   aspirin delayed-release 81 mg tablet Take  by mouth daily. Provider, Historical       No Known Allergies    Review of Systems  Review of systems not obtained due to patient factors. Physical Exam:      Visit Vitals  /66 (BP 1 Location: Left arm)   Pulse 96   Temp 98.3 °F (36.8 °C)   Resp 24   Ht 4' 11\" (1.499 m)   Wt 66 kg (145 lb 8 oz)   SpO2 100%   BMI 29.39 kg/m²       Physical Exam:    Gen: In general, this is a thinly built female in no acute distress  HEENT: Sclerae nonicteric. Oral mucous membranes moist. Dentition poor  Neck: Supple with midline trachea. CV: RRR without murmur or rub appreciated. Resp:Respirations are unlabored without use of accessory muscles. Lung fields B/L decreased BS in bases   Extrem: Extremities are warm, without cyanosis or clubbing. Mild pitting pretibial edema. Skin: Warm, no visible rashes. Neuro: Patient is alert, oriented, and cooperative. No obvious focal defects. Moves all 4 extremities.     Labs Reviewed:    Recent Results (from the past 24 hour(s))   CBC WITH AUTOMATED DIFF    Collection Time: 06/05/19 10:00 AM   Result Value Ref Range    WBC 11.7 4.6 - 13.2 K/uL    RBC 3.57 (L) 4.20 - 5.30 M/uL    HGB 9.8 (L) 12.0 - 16.0 g/dL    HCT 31.2 (L) 35.0 - 45.0 %    MCV 87.4 74.0 - 97.0 FL    MCH 27.5 24.0 - 34.0 PG    MCHC 31.4 31.0 - 37.0 g/dL    RDW 17.5 (H) 11.6 - 14.5 % PLATELET 109 285 - 430 K/uL    MPV 11.0 9.2 - 11.8 FL    NEUTROPHILS 79 (H) 40 - 73 %    LYMPHOCYTES 12 (L) 21 - 52 %    MONOCYTES 8 3 - 10 %    EOSINOPHILS 1 0 - 5 %    BASOPHILS 0 0 - 2 %    ABS. NEUTROPHILS 9.2 (H) 1.8 - 8.0 K/UL    ABS. LYMPHOCYTES 1.5 0.9 - 3.6 K/UL    ABS. MONOCYTES 1.0 0.05 - 1.2 K/UL    ABS. EOSINOPHILS 0.1 0.0 - 0.4 K/UL    ABS.  BASOPHILS 0.0 0.0 - 0.1 K/UL    DF AUTOMATED     METABOLIC PANEL, BASIC    Collection Time: 06/05/19 10:00 AM   Result Value Ref Range    Sodium 144 136 - 145 mmol/L    Potassium 4.8 3.5 - 5.5 mmol/L    Chloride 108 100 - 108 mmol/L    CO2 29 21 - 32 mmol/L    Anion gap 7 3.0 - 18 mmol/L    Glucose 171 (H) 74 - 99 mg/dL    BUN 39 (H) 7.0 - 18 MG/DL    Creatinine 1.90 (H) 0.6 - 1.3 MG/DL    BUN/Creatinine ratio 21 (H) 12 - 20      GFR est AA 30 (L) >60 ml/min/1.73m2    GFR est non-AA 25 (L) >60 ml/min/1.73m2    Calcium 9.4 8.5 - 10.1 MG/DL   CARDIAC PANEL,(CK, CKMB & TROPONIN)    Collection Time: 06/05/19 10:00 AM   Result Value Ref Range    CK 69 26 - 192 U/L    CK - MB 2.0 <3.6 ng/ml    CK-MB Index 2.9 0.0 - 4.0 %    Troponin-I, QT 0.43 (H) 0.0 - 0.045 NG/ML   NT-PRO BNP    Collection Time: 06/05/19 10:00 AM   Result Value Ref Range    NT pro-BNP 25,638 (H) 0 - 1,800 PG/ML   EKG, 12 LEAD, INITIAL    Collection Time: 06/05/19 10:13 AM   Result Value Ref Range    Ventricular Rate 95 BPM    Atrial Rate 95 BPM    P-R Interval 138 ms    QRS Duration 106 ms    Q-T Interval 386 ms    QTC Calculation (Bezet) 485 ms    Calculated P Axis 66 degrees    Calculated R Axis 97 degrees    Calculated T Axis -27 degrees    Diagnosis       Normal sinus rhythm  Possible Left atrial enlargement  Rightward axis  Low voltage QRS  Incomplete right bundle branch block  ST & T wave abnormality, consider anterolateral ischemia  Abnormal ECG  When compared with ECG of 03-FEB-2011 19:12,  T wave inversion now evident in Anterolateral leads         Imaging Reviewed:    CXR - IMPRESSION:     1.  Bilateral pleural effusion, septal line thickening, and cardiomegaly. Suggestive of developing CHF. Differential considerations include bilateral  pneumonia vs. nonspecific atelectatic changes with pleural effusion.   2.  Underlying COPD.           Hugh Georges MD  6/5/2019, 1:29 PM

## 2019-06-05 NOTE — ED PROVIDER NOTES
EMERGENCY DEPARTMENT HISTORY AND PHYSICAL EXAM  This was created with voice recognition software and transcription errors may be present. 10:20 AM  Date: 6/5/2019  Patient Name: Gonsalo Villa    History of Presenting Illness     Chief Complaint:    History Provided By:     HPI: Gonsalo Villa is a 80 y.o. female with a past medical history of anemia anxiety colon cancer renal insufficiency CVA dementia diverticulosis TIA right bundle branch who presents with shortness of breath. Family notes also history of CHF. She has had worsening shortness of breath since last night associate with leg swelling. No fever no chills no cough. Patient arrived on CPAP and history is limited at this time. PCP: Rene Schilder, MD      Past History     Past Medical History:  Past Medical History:   Diagnosis Date    Anemia NEC     Anxiety     Cataract     bilat    Colon cancer ([facility] [address] 1999    CRI (chronic renal insufficiency) 4/07    CVA (cerebral infarction) ([facility] [address] 4/01    right facial droop    CVA (cerebral infarction) ([facility] [address] 2/11    left post corona radiata    Dementia     Diverticulosis     Edema     GERD (gastroesophageal reflux disease)     Hypercholesterolemia     Hypertension     Hypomagnesemia 2/11    Lipoma of neck     right    Orthostatic hypotension     RBBB (right bundle branch block)     Syncope 6/08    TIA (transient ischemic attack) mid 1990's       Past Surgical History:  Past Surgical History:   Procedure Laterality Date    ENDOSCOPY, COLON, DIAGNOSTIC  2006    HX BREAST BIOPSY      HX CATARACT REMOVAL  10/02    left    HX CATARACT REMOVAL  6/03    right    HX COLECTOMY  1999    right hemicolectomy    HX HYSTERECTOMY      HX LIPOMA RESECTION         Family History:  No family history on file.     Social History:  Social History     Tobacco Use    Smoking status: Never Smoker   Substance Use Topics    Alcohol use: No    Drug use: Not on file       Allergies:  No Known Allergies    Review of Systems     Review of Systems   Constitutional: Negative for fever. Respiratory: Positive for shortness of breath. Cardiovascular: Negative for chest pain. All other systems reviewed and are negative. 10 point review of systems otherwise negative unless noted in HPI. Physical Exam       Physical Exam   Constitutional: She is oriented to person, place, and time. She appears well-developed. HENT:   Head: Normocephalic and atraumatic. Eyes: Pupils are equal, round, and reactive to light. EOM are normal.   Neck: Normal range of motion. Neck supple. Cardiovascular: Normal rate, regular rhythm and normal heart sounds. Exam reveals no friction rub. No murmur heard. Pulmonary/Chest: Effort normal and breath sounds normal. No respiratory distress. She has no wheezes. Abdominal: Soft. She exhibits no distension. There is no tenderness. There is no rebound and no guarding. Musculoskeletal: Normal range of motion. Neurological: She is alert and oriented to person, place, and time. Skin: Skin is warm and dry. Psychiatric: She has a normal mood and affect. Her behavior is normal. Thought content normal.       Diagnostic Study Results     Vital Signs  EKG: EKG shows sinus at 95 normal axis normal intervals there is no ST elevation or depression no hypertrophy T wave inversions in V4 V5 and V6. Last EKG done here was in 2009. I am unable to view that  Labs: Labs reviewed mildly elevated troponin II 0.43 with a BN P of 25,000, last BNP was 2000 about a year ago  Imaging: CHF    Medical Decision Making     ED Course: Progress Notes, Reevaluation, and Consults:      Provider Notes (Medical Decision Making):    80year-old female presents with shortness of breath hypertensive borderline history of heart failure with some leg swelling. Chest x-ray looks like heart failure. She has improved on BiPAP she has been given Lasix.   Initial troponin is noted although there is no increase to the CK-MB or CK so this may be chronic troponin. BNP is elevated but no comparison within the past years of the rate of rise is unknown. She is currently on BiPAP with oxygen sats around 95% will admit to stepdown and have cardiology see her. Diagnosis     Clinical Impression: No diagnosis found. Disposition:    Patient's Medications   Start Taking    No medications on file   Continue Taking    ASPIRIN DELAYED-RELEASE 81 MG TABLET    Take  by mouth daily. CARVEDILOL (COREG) 12.5 MG TABLET    TAKE 1 TABLET TWICE A DAY WITH MEALS    CLONIDINE (CATAPRES) 0.1 MG TABLET    Take 1 Tab by mouth two (2) times a day. CLOPIDOGREL (PLAVIX) 75 MG TABLET    Take 1 by mouth Daily    DIAZEPAM (VALIUM) 5 MG TABLET    Take 1 Tab by mouth every six (6) hours as needed. DONEPEZIL (ARICEPT) 10 MG TABLET    TAKE 1 TABLET NIGHTLY    FELODIPINE (PLENDIL SR) 10 MG 24 HR TABLET    Take 1 Tab by mouth daily. FUROSEMIDE (LASIX) 40 MG TABLET    Take 1 Tab by mouth daily. HYDROCODONE-ACETAMINOPHEN (NORCO) 5-325 MG PER TABLET    Take 1 Tab by mouth every four (4) hours as needed for Pain. MEMANTINE (NAMENDA) 10 MG TABLET    Take 1 Tab by mouth two (2) times a day. NAMENDA 10 MG TABLET    TAKE 1 TABLET TWICE A DAY    SIMVASTATIN (ZOCOR) 20 MG TABLET    Take 1 Tab by mouth nightly.    These Medications have changed    No medications on file   Stop Taking    No medications on file

## 2019-06-05 NOTE — CDMP QUERY
Patient admitted with acuteon chronic combined CHF and has Respiratory Failure documented. Please further specify type and acuity of Respiratory Failure in the medical record. ? Acute respiratory failure   o With hypoxia   o With hypercapnia   o With hypoxia and hypercapnia  ? Other, please specify  ? Clinically unable to determine    Sats 100% on BIPAP  RR 24-33 in ER    If you DECLINE this query or would like to communicate with [facility], please utilize the \"[employer] message box\" at the TOP of the Progress Note on the right.       Thank you,  Esperanza Angulo RN/CCDS  [phone]

## 2019-06-05 NOTE — CDMP QUERY
Patient admitted with acute CHF, noted to have elevated troponin. If possible, please document in progress notes and d/c summary if you are evaluating and/or treating any of the following:     ? Demand ischemia in the setting of Acute CHF/Acute respiratory failure  ? Other, please specify  ? Unable to Determine    The medical record reflects the following:    Risk Factors: Hx CHF; CKD    Clinical Indicators: Troponin  0.43; H&P-  mild elevation in trop - likely cardiac strain   EKG- Normal sinus rhythm   Possible Left atrial enlargement   Rightward axis   Low voltage QRS   Incomplete right bundle branch block   ST & T wave abnormality, consider anterolateral ischemia     Treatment: Labs; Cardiology consult; Lasix; BIPAP    If you DECLINE this query or would like to communicate with CDIS, please utilize the \"[employer] message box\" at the TOP of the Progress Note on the right.       Thank you,  Ria Foster RN/CCDS  [phone]

## 2019-06-05 NOTE — ED NOTES
TRANSFER - OUT REPORT: 
 
Verbal report given to PATT Sood on [address]  being transferred to CVT SD (unit) for routine progression of care Report consisted of patients Situation, Background, Assessment and  
Recommendations(SBAR). Information from the following report(s) ED Summary was reviewed with the receiving nurse. Lines:  
Peripheral IV 06/05/19 Left Antecubital (Active) Site Assessment Clean, dry, & intact 6/5/2019 10:21 AM  
Phlebitis Assessment 0 6/5/2019 10:21 AM  
Infiltration Assessment 0 6/5/2019 10:21 AM  
Dressing Status Clean, dry, & intact 6/5/2019 10:21 AM  
Hub Color/Line Status Pink;Flushed;Patent 6/5/2019 10:21 AM  
  
 
Opportunity for questions and clarification was provided. Patient transported with: 
 O2 @  BPAP liters

## 2019-06-05 NOTE — ED NOTES
Patient Spo2 dropped to 82% on nasal canal patient placed back on BI-PAP. Family at the bedside at this time.

## 2019-06-05 NOTE — CONSULTS
[facility] - Consult Note    Consultation request by Baltazar Meza MD for advice/opinion related to evaluating Acute CHF (congestive heart failure) ([facility] [address] [I50.9]    Date of  Admission: 6/5/2019  9:54 AM   Primary Care Physician:  Lizzy Medina MD     The patient was seen, examined, and independently evaluated and I agree with the below assessment and plan by Lizzy Madrid NP with the following comments. This elderly lady appears to have developed acute CHF and agree with IV diuresis as ordered. Will review echocardiogram with further recommendations pending course. Assessment:     Patient Active Problem List   Diagnosis Code    Dementia F03.90    HTN (hypertension) I10    CRI (chronic renal insufficiency) N18.9    Dyslipidemia E78.5    Acute CHF (congestive heart failure) ([facility] I50.9     - Acute Congestive Heart Failure - Unclear duration; BNP elevated ion admission (>25K), CXR reveals developing CHF;.Previous echocardiogram not available to review in Epic; Patient placed on BiPAP on admission. Family at bedside denies any previous MI or diagnosis of CHF  - Elevated Troponin (0.43); Possibly indeterminate in setting of acute heart failure.  Patient denies chest pain, EKG reveals chronic RBBB, with no acute ischemic changes  - Essential Hypertension - Elevated on arrival, sub-optimal control  - Hyperlipidemia - On statin  - History of CVA (2015)  - Chronic Renal Insufficiency - Cr 1.9, baseline 1.5-1.6  - History of Dementia - On namenda    No primary Cardiologist.     Plan:     - Continue to trend troponin; though patient may not be a candidate for invasive coronary evaluation  - Obtain echocardiogram to assess CV structure function  - Check D-dimer  - Agree with IV diuresis, strict I/Os, daily weights  - Continue conservative medical management with ASA, Plavix, Statin, beta blocker  - Continue clonidine; optimize as BP tolerated  - More recommendations pending clinical course     History of Present Illness: This is a 80 y.o. female admitted for Acute CHF (congestive heart failure) ([facility] [I50.9]. Patient complains of progressive dyspnea. The patient was brought to the hospital after suddenly finding it difficult to catch her breath after a short ambulation. She denies any chest pain, dizziness or palpitations. The patient is a poor historian, and much of the information was obtained by family at bedside. On arrival to the ED, the patient was placed on BiPAP and started on IV furosemide. She was noted to have an elevated BNP (>25K), and a CXR that suggested developing CHF. Additionally, the patient was noted to have a minimally elevated troponin (0.43). The patient has an additional medical history of HTN, HLP, CVA (2015), renal insufficiency, and dementia. She denies any current tobacco or recreational drug use. Cardiac risk factors: dyslipidemia, sedentary life style, hypertension, post-menopausal      Review of Symptoms:  Except as stated above include:  Constitutional:  negative  Respiratory:  SOB on E  Cardiovascular:  negative  Gastrointestinal: negative  Genitourinary:  negative  Musculoskeletal:  Negative  Neurological:  Negative  Dermatological:  Negative  Endocrinological: Negative  Psychological:  Negative    Pertinent items are noted in HPI.      Past Medical History:     Past Medical History:   Diagnosis Date    Anemia NEC     Anxiety     Cataract     bilat    Colon cancer ([facility] 1999    CRI (chronic renal insufficiency) 4/07    [facility]re[facility] 4/01    right facial droop    CVA (cerebral infarction) ([facility] 2/11    left post corona radiata    Dementia     Diverticulosis     Edema     GERD (gastroesophageal reflux disease)     Hypercholesterolemia     Hypertension     Hypomagnesemia 2/11    Lipoma of neck     right    Orthostatic hypotension     RBBB (right bundle branch block)     Syncope 6/08    TIA (transient ischemic attack) mid 1990's         Social History:     Social History     Socioeconomic History    Marital status:      Spouse name: Not on file    Number of children: Not on file    Years of education: Not on file    Highest education level: Not on file   Tobacco Use    Smoking status: Never Smoker   Substance and Sexual Activity    Alcohol use: No    Sexual activity: Never        Family History:   No family history on file. Medications:   No Known Allergies     No current facility-administered medications for this encounter. Current Outpatient Medications   Medication Sig    ferrous sulfate 325 mg (65 mg iron) tablet Take 325 mg by mouth Daily (before breakfast).  multivitamin, tx-iron-ca-min (THERA-M W/ IRON) 9 mg iron-400 mcg tab tablet Take 1 Tab by mouth daily.  loratadine (CLARITIN) 10 mg tablet Take 10 mg by mouth.  donepezil (ARICEPT) 10 mg tablet Take 10 mg by mouth nightly.  carvedilol (COREG) 12.5 mg tablet TAKE 1 TABLET TWICE A DAY WITH MEALS    NAMENDA 10 mg tablet TAKE 1 TABLET TWICE A DAY    HYDROcodone-acetaminophen (NORCO) 5-325 mg per tablet Take 1 Tab by mouth every four (4) hours as needed for Pain.  simvastatin (ZOCOR) 20 mg tablet Take 1 Tab by mouth nightly.  cloNIDine (CATAPRES) 0.1 mg tablet Take 1 Tab by mouth two (2) times a day.  diazepam (VALIUM) 5 mg tablet Take 1 Tab by mouth every six (6) hours as needed.  furosemide (LASIX) 40 mg tablet Take 1 Tab by mouth daily.  clopidogrel (PLAVIX) 75 mg tablet Take 1 by mouth Daily    felodipine (PLENDIL SR) 10 mg 24 hr tablet Take 1 Tab by mouth daily.  aspirin delayed-release 81 mg tablet Take  by mouth daily.          Physical Exam:     Visit Vitals  /66 (BP 1 Location: Left arm)   Pulse 96   Temp 98.3 °F (36.8 °C)   Resp 24   Ht 4' 11\" (1.499 m)   Wt 66 kg (145 lb 8 oz)   SpO2 100%   BMI 29.39 kg/m²     BP Readings from Last 3 Encounters:   06/05/19 184/66   05/01/18 185/60   05/08/13 171/84     Pulse Readings from Last 3 Encounters:   06/05/19 96   05/01/18 67   05/08/13 68     Wt Readings from Last 3 Encounters:   06/05/19 66 kg (145 lb 8 oz)   05/01/18 67.1 kg (148 lb)   05/07/13 81.6 kg (180 lb)       General:  no distress, appears stated age  Neck:  nontender, +JVD  Lungs:  diminished breath sounds R anterior, R base, L anterior, L base  Heart:  regular rate and rhythm, S1, S2 normal, no murmur, click, rub or gallop  Abdomen:  abdomen is soft without significant tenderness, masses, organomegaly or guarding  Extremities:  extremities normal, atraumatic, no cyanosis or edema  Skin: Warm and dry.  no hyperpigmentation, vitiligo, or suspicious lesions  Neuro: alert, oriented x3, affect appropriate  Psych: non focal     Data Review:     Recent Labs     06/05/19  1000   WBC 11.7   HGB 9.8*   HCT 31.2*        Recent Labs     06/05/19  1000      K 4.8      CO2 29   *   BUN 39*   CREA 1.90*   CA 9.4       Results for orders placed or performed during the hospital encounter of 06/05/19   EKG, 12 LEAD, INITIAL   Result Value Ref Range    Ventricular Rate 95 BPM    Atrial Rate 95 BPM    P-R Interval 138 ms    QRS Duration 106 ms    Q-T Interval 386 ms    QTC Calculation (Bezet) 485 ms    Calculated P Axis 66 degrees    Calculated R Axis 97 degrees    Calculated T Axis -27 degrees    Diagnosis       Normal sinus rhythm  Possible Left atrial enlargement  Rightward axis  Low voltage QRS  Incomplete right bundle branch block  ST & T wave abnormality, consider anterolateral ischemia  Abnormal ECG  When compared with ECG of 03-FEB-2011 19:12,  T wave inversion now evident in Anterolateral leads         All Cardiac Markers in the last 24 hours:    Lab Results   Component Value Date/Time    CPK 69 06/05/2019 10:00 AM    CKMB 2.0 06/05/2019 10:00 AM    CKND1 2.9 06/05/2019 10:00 AM    TROIQ 0.43 (H) 06/05/2019 10:00 AM       Last Lipid:    Lab Results   Component Value Date/Time Cholesterol, total 165 03/18/2013 12:29 PM    HDL Cholesterol 56 03/18/2013 12:29 PM    LDL, calculated 47 03/18/2013 12:29 PM    Triglyceride 311 (H) 03/18/2013 12:29 PM    CHOL/HDL Ratio 3.3 12/22/2010 02:00 AM       Signed By: Jose Horton., GERA     June 5, 2019

## 2019-06-06 ENCOUNTER — APPOINTMENT (OUTPATIENT)
Dept: VASCULAR SURGERY | Age: 84
DRG: 291 | End: 2019-06-06
Attending: HOSPITALIST
Payer: MEDICARE

## 2019-06-06 ENCOUNTER — APPOINTMENT (OUTPATIENT)
Dept: NON INVASIVE DIAGNOSTICS | Age: 84
DRG: 291 | End: 2019-06-06
Attending: NURSE PRACTITIONER
Payer: MEDICARE

## 2019-06-06 LAB
ALBUMIN SERPL-MCNC: 2.7 G/DL (ref 3.4–5)
ALBUMIN/GLOB SERPL: 0.8 {RATIO} (ref 0.8–1.7)
ALP SERPL-CCNC: 85 U/L (ref 45–117)
ALT SERPL-CCNC: 51 U/L (ref 13–56)
ANION GAP SERPL CALC-SCNC: 7 MMOL/L (ref 3–18)
APTT PPP: 41.1 SEC (ref 23–36.4)
AST SERPL-CCNC: 29 U/L (ref 15–37)
BILIRUB SERPL-MCNC: 0.8 MG/DL (ref 0.2–1)
BUN SERPL-MCNC: 41 MG/DL (ref 7–18)
BUN/CREAT SERPL: 23 (ref 12–20)
CALCIUM SERPL-MCNC: 8.8 MG/DL (ref 8.5–10.1)
CHLORIDE SERPL-SCNC: 108 MMOL/L (ref 100–108)
CHOLEST SERPL-MCNC: 123 MG/DL
CO2 SERPL-SCNC: 31 MMOL/L (ref 21–32)
CREAT SERPL-MCNC: 1.8 MG/DL (ref 0.6–1.3)
ECHO AO ROOT DIAM: 2.7 CM
ECHO IVC SNIFF: 1.88 CM
ECHO LA AREA 4C: 24.8 CM2
ECHO LA VOL 2C: 82.55 ML (ref 22–52)
ECHO LA VOL 4C: 74.18 ML (ref 22–52)
ECHO LA VOL BP: 89.88 ML (ref 22–52)
ECHO LA VOL/BSA BIPLANE: 60.14 ML/M2 (ref 16–28)
ECHO LA VOLUME INDEX A2C: 55.23 ML/M2 (ref 16–28)
ECHO LA VOLUME INDEX A4C: 49.63 ML/M2 (ref 16–28)
ECHO LV INTERNAL DIMENSION DIASTOLIC: 3.75 CM (ref 3.9–5.3)
ECHO LV INTERNAL DIMENSION SYSTOLIC: 2.88 CM
ECHO LV IVSD: 1.55 CM (ref 0.6–0.9)
ECHO LV MASS 2D: 178.9 G (ref 67–162)
ECHO LV MASS INDEX 2D: 119.7 G/M2 (ref 43–95)
ECHO LV POSTERIOR WALL DIASTOLIC: 0.9 CM (ref 0.6–0.9)
ECHO LVOT DIAM: 1.95 CM
ECHO LVOT PEAK GRADIENT: 1.7 MMHG
ECHO LVOT PEAK VELOCITY: 65.23 CM/S
ECHO LVOT VTI: 13.04 CM
ECHO MV A VELOCITY: 109.16 CM/S
ECHO MV AREA VTI: 1.3 CM2
ECHO MV E DECELERATION TIME (DT): 223.9 MS
ECHO MV E VELOCITY: 108.4 CM/S
ECHO MV E/A RATIO: 0.99
ECHO MV MAX VELOCITY: 118.98 CM/S
ECHO MV MEAN GRADIENT: 1.9 MMHG
ECHO MV PEAK GRADIENT: 5.7 MMHG
ECHO MV VTI: 30.35 CM
ECHO PULMONARY ARTERY SYSTOLIC PRESSURE (PASP): 52 MMHG
ECHO PVEIN A VELOCITY: 0.79 CM/S
ECHO PVEIN PEAK D VELOCITY: 0.43 CM/S
ECHO PVEIN S/D RATIO: 59.6
ECHO TV REGURGITANT MAX VELOCITY: 329.76 CM/S
ECHO TV REGURGITANT PEAK GRADIENT: 43.5 MMHG
ERYTHROCYTE [DISTWIDTH] IN BLOOD BY AUTOMATED COUNT: 16.9 % (ref 11.6–14.5)
GLOBULIN SER CALC-MCNC: 3.3 G/DL (ref 2–4)
GLUCOSE SERPL-MCNC: 91 MG/DL (ref 74–99)
HCT VFR BLD AUTO: 26.4 % (ref 35–45)
HDLC SERPL-MCNC: 62 MG/DL (ref 40–60)
HDLC SERPL: 2 {RATIO} (ref 0–5)
HGB BLD-MCNC: 8.3 G/DL (ref 12–16)
LDLC SERPL CALC-MCNC: 44.8 MG/DL (ref 0–100)
LIPID PROFILE,FLP: ABNORMAL
MCH RBC QN AUTO: 26.9 PG (ref 24–34)
MCHC RBC AUTO-ENTMCNC: 31.4 G/DL (ref 31–37)
MCV RBC AUTO: 85.7 FL (ref 74–97)
PLATELET # BLD AUTO: 175 K/UL (ref 135–420)
PMV BLD AUTO: 11.6 FL (ref 9.2–11.8)
POTASSIUM SERPL-SCNC: 4.3 MMOL/L (ref 3.5–5.5)
PROT SERPL-MCNC: 6 G/DL (ref 6.4–8.2)
RBC # BLD AUTO: 3.08 M/UL (ref 4.2–5.3)
SODIUM SERPL-SCNC: 146 MMOL/L (ref 136–145)
TRIGL SERPL-MCNC: 81 MG/DL (ref ?–150)
TROPONIN I SERPL-MCNC: 0.91 NG/ML (ref 0–0.04)
TROPONIN I SERPL-MCNC: 0.93 NG/ML (ref 0–0.04)
TROPONIN I SERPL-MCNC: 1.1 NG/ML (ref 0–0.04)
VLDLC SERPL CALC-MCNC: 16.2 MG/DL
WBC # BLD AUTO: 8.8 K/UL (ref 4.6–13.2)

## 2019-06-06 PROCEDURE — 92526 ORAL FUNCTION THERAPY: CPT

## 2019-06-06 PROCEDURE — 80053 COMPREHEN METABOLIC PANEL: CPT

## 2019-06-06 PROCEDURE — 65660000004 HC RM CVT STEPDOWN

## 2019-06-06 PROCEDURE — 85730 THROMBOPLASTIN TIME PARTIAL: CPT

## 2019-06-06 PROCEDURE — 77010033678 HC OXYGEN DAILY: Performed by: HOSPITALIST

## 2019-06-06 PROCEDURE — 74011250636 HC RX REV CODE- 250/636: Performed by: NURSE PRACTITIONER

## 2019-06-06 PROCEDURE — 85027 COMPLETE CBC AUTOMATED: CPT

## 2019-06-06 PROCEDURE — 93970 EXTREMITY STUDY: CPT

## 2019-06-06 PROCEDURE — 84484 ASSAY OF TROPONIN QUANT: CPT

## 2019-06-06 PROCEDURE — 36415 COLL VENOUS BLD VENIPUNCTURE: CPT

## 2019-06-06 PROCEDURE — 80061 LIPID PANEL: CPT

## 2019-06-06 PROCEDURE — 92610 EVALUATE SWALLOWING FUNCTION: CPT

## 2019-06-06 PROCEDURE — 74011250637 HC RX REV CODE- 250/637: Performed by: HOSPITALIST

## 2019-06-06 PROCEDURE — 93306 TTE W/DOPPLER COMPLETE: CPT

## 2019-06-06 PROCEDURE — 74011250636 HC RX REV CODE- 250/636: Performed by: HOSPITALIST

## 2019-06-06 RX ORDER — CLOPIDOGREL BISULFATE 75 MG/1
75 TABLET ORAL DAILY
COMMUNITY
End: 2020-01-01

## 2019-06-06 RX ORDER — LORATADINE 10 MG/1
10 TABLET ORAL EVERY OTHER DAY
Status: DISCONTINUED | OUTPATIENT
Start: 2019-06-08 | End: 2019-06-07

## 2019-06-06 RX ADMIN — MULTIPLE VITAMINS W/ MINERALS TAB 1 TABLET: TAB at 10:15

## 2019-06-06 RX ADMIN — Medication 10 ML: at 06:00

## 2019-06-06 RX ADMIN — HEPARIN SODIUM 5000 UNITS: 5000 INJECTION INTRAVENOUS; SUBCUTANEOUS at 10:14

## 2019-06-06 RX ADMIN — HEPARIN SODIUM 5000 UNITS: 5000 INJECTION INTRAVENOUS; SUBCUTANEOUS at 18:59

## 2019-06-06 RX ADMIN — CLOPIDOGREL BISULFATE 75 MG: 75 TABLET, FILM COATED ORAL at 10:16

## 2019-06-06 RX ADMIN — MEMANTINE 10 MG: 10 TABLET ORAL at 10:16

## 2019-06-06 RX ADMIN — CLONIDINE HYDROCHLORIDE 0.1 MG: 0.1 TABLET ORAL at 18:58

## 2019-06-06 RX ADMIN — CLONIDINE HYDROCHLORIDE 0.1 MG: 0.1 TABLET ORAL at 10:15

## 2019-06-06 RX ADMIN — FERROUS SULFATE TAB 325 MG (65 MG ELEMENTAL FE) 325 MG: 325 (65 FE) TAB at 10:15

## 2019-06-06 RX ADMIN — ACETAMINOPHEN 650 MG: 325 TABLET ORAL at 10:15

## 2019-06-06 RX ADMIN — FUROSEMIDE 40 MG: 10 INJECTION, SOLUTION INTRAMUSCULAR; INTRAVENOUS at 18:58

## 2019-06-06 RX ADMIN — ASPIRIN 81 MG: 81 TABLET, COATED ORAL at 10:15

## 2019-06-06 RX ADMIN — CARVEDILOL 12.5 MG: 12.5 TABLET, FILM COATED ORAL at 18:58

## 2019-06-06 RX ADMIN — CARVEDILOL 12.5 MG: 12.5 TABLET, FILM COATED ORAL at 10:16

## 2019-06-06 RX ADMIN — SIMVASTATIN 20 MG: 20 TABLET, FILM COATED ORAL at 21:15

## 2019-06-06 RX ADMIN — DONEPEZIL HYDROCHLORIDE 10 MG: 5 TABLET, FILM COATED ORAL at 21:15

## 2019-06-06 RX ADMIN — LORATADINE 10 MG: 10 TABLET ORAL at 10:15

## 2019-06-06 RX ADMIN — Medication 10 ML: at 22:00

## 2019-06-06 RX ADMIN — Medication 10 ML: at 17:03

## 2019-06-06 RX ADMIN — FUROSEMIDE 40 MG: 10 INJECTION, SOLUTION INTRAMUSCULAR; INTRAVENOUS at 10:16

## 2019-06-06 RX ADMIN — HEPARIN SODIUM 5000 UNITS: 5000 INJECTION INTRAVENOUS; SUBCUTANEOUS at 23:26

## 2019-06-06 NOTE — PROGRESS NOTES
Reason for Admission:   Acute CHF (congestive heart failure) ([facility] [address] [I50.9] CHF exacerbation ([facility] [address] [I50.9] RRAT Score:     24 Resources/supports as identified by patient/family:   Patient has very supportive family. Top Challenges facing patient (as identified by patient/family and CM): Finances/Medication cost?     NO Transportation      Family will transport home. Support system or lack thereof? Family support system. Living arrangements? Her grandson resides with her. Self-care/ADLs/Cognition? Alert with periods of confusion. Current Advanced Directive/Advance Care Plan:   no 
                       
Plan for utilizing home health:   Patient has no home health orders in place at this time. This writer will continue to closely monitor for potential home health needs and orders. Likelihood of readmission:   HIGH Transition of Care Plan:    Patient will return home with help from her family. Family will also transport home at the time of discharge. Initial assessment completed with patient and her family. Cognitive status of patient: Alert and periods of confusion. Face sheet information confirmed:  yes. The patient designates her son [place] to participate in her discharge plan and to receive any needed information. This patient lives in a house with her grandson. Patient is able to navigate steps as needed. Prior to hospitalization, patient was considered to be independent with ADLs/IADLS : yes . Patient has a current ACP document on file: no  
 
Patient's family will be available to transport patient home upon discharge. The patient already has a walker and a wheelchair available in the home. Patient is not currently active with home health. Patient has stayed in a skilled nursing facility or rehab. Was  stay within last 60 days : no. Currently, the discharge plan is to return home with help from her family. Family will assist with care, as needed, post discharge. Patient's family will also transport home. Patient's son is Luann Draper, YD#[phone]). Patient's PCP is Estefanía Sher. Patient is insured through [facility] and she also has [address]. The patient states that she can obtain her medications from the pharmacy, and take her medications as directed. This writer will continue to closely monitor for discharge planning to ensure a safe discharge home from [facility]. Care Management Interventions PCP Verified by CM: Yes(Estefanía Sher) Palliative Care Criteria Met (RRAT>21 & CHF Dx)?: No 
Mode of Transport at Discharge: Other (see comment)(Family will transport home.) Transition of Care Consult (CM Consult): Discharge Planning Current Support Network: Own Home, Family Lives Nearby(Grandson resides with her.) Confirm Follow Up Transport: Family Plan discussed with Pt/Family/Caregiver: Yes Discharge Location Discharge Placement: Home with family assistance Nasim Medina. MSW Care Manager Pager#: [phone]

## 2019-06-06 NOTE — PROGRESS NOTES
[facility] - Progress Note Admit Date: 6/5/2019 Patient seen and examined independently. Breathing improved per family members. Echo demonstrates normal systolic function. Diastolic function unavailable. We will continue diuretics and monitoring of renal status. Troponins are not consistent with ACS. Agree with assessment and plan as noted below. Bradly Peraza MD 
Assessment:  
 
Hospital Problems  Date Reviewed: 3/18/2013 Codes Class Noted POA Acute CHF (congestive heart failure) (HCC) ICD-10-CM: I50.9 ICD-9-CM: 428.0  6/5/2019 Unknown CHF exacerbation (HCC) ICD-10-CM: I50.9 ICD-9-CM: 428.0  6/5/2019 Unknown - Acute Congestive Heart Failure - Unclear duration; BNP elevated ion admission (>25K), CXR reveals developing CHF;.Previous echocardiogram not available to review in Epic; Patient placed on BiPAP on admission. Family at bedside denies any previous MI or diagnosis of CHF 
- Elevated Troponin (0.43, 1.10, 0.93); Possibly indeterminate in setting of acute heart failure, though concerning for PE given elevated D-dimer (2.93). Patient denies chest pain, EKG reveals chronic RBBB, with no acute ischemic changes - Essential Hypertension - Elevated on arrival, sub-optimal control - Hyperlipidemia - On statin 
- History of CVA (2015) - Chronic Renal Insufficiency - Admission SCr 1.9, baseline 1.5-1.6 
- History of Dementia - On namenda - DNR status 
  
No primary Cardiologist. 
  
Plan: - Follow echo results 
- Continue conservative medical management with ASA, Plavix Statin, beta blocker - Continue heparin gtt for now; consider VQ scan to R/O PE 
- Continue diuresis, I/Os, daily weight 
- Continue clonidine; optimize as BP tolerates Subjective:  
 
Patient reports much improved breathing. Denies any chest pain, dizziness, palpitations. Family at bedside Objective:  
  
Patient Vitals for the past 8 hrs: 
 Temp Pulse Resp BP SpO2 06/06/19 0814    145/53   
06/06/19 0734 97.4 °F (36.3 °C) 73 26 145/53 99 % 06/06/19 0505 98 °F (36.7 °C) 76 (!) 35 144/57 98 % Patient Vitals for the past 96 hrs: 
 Weight 06/06/19 0814 122 lb (55.3 kg) 06/05/19 2257 122 lb (55.3 kg) 06/05/19 1002 145 lb 8 oz (66 kg) Intake/Output Summary (Last 24 hours) at 6/6/2019 1003 Last data filed at 6/6/2019 3700 Gross per 24 hour Intake  Output 1300 ml Net -1300 ml Physical Exam: 
General:  alert, cooperative, no distress, appears stated age Neck:  nontender Lungs:  diminished breath sounds R anterior, R base, L anterior, L base Heart:  regular rate and rhythm, S1, S2 normal, no murmur, click, rub or gallop Abdomen:  abdomen is soft without significant tenderness, masses, organomegaly or guarding Extremities:  extremities normal, atraumatic, no cyanosis; trace BLE edema Data Review:  
 
Labs: Results:  
   
Chemistry Recent Labs  
  06/06/19 
0538 06/05/19 
1000 GLU 91 171* * 144  
K 4.3 4.8  
 108 CO2 31 29 BUN 41* 39* CREA 1.80* 1.90* CA 8.8 9.4 AGAP 7 7 BUCR 23* 21* AP 85  --   
TP 6.0*  --   
ALB 2.7*  --   
GLOB 3.3  --   
AGRAT 0.8  --   
  
CBC w/Diff Recent Labs  
  06/06/19 
0538 06/05/19 
1000 WBC 8.8 11.7 RBC 3.08* 3.57* HGB 8.3* 9.8* HCT 26.4* 31.2*  
 192 GRANS  --  79* LYMPH  --  12* EOS  --  1 Cardiac Enzymes Lab Results Component Value Date/Time TROIQ 0.91 (H) 06/06/2019 09:00 AM  
 TROIQ 0.93 (H) 06/06/2019 05:38 AM  
 TROIQ 1.10 (H) 06/05/2019 11:52 PM  
  
Coagulation Recent Labs  
  06/06/19 
1950 APTT 41.1* Lipid Panel Lab Results Component Value Date/Time  Cholesterol, total 123 06/06/2019 05:38 AM  
 HDL Cholesterol 62 (H) 06/06/2019 05:38 AM  
 LDL, calculated 44.8 06/06/2019 05:38 AM  
 VLDL, calculated 16.2 06/06/2019 05:38 AM  
 Triglyceride 81 06/06/2019 05:38 AM  
 CHOL/HDL Ratio 2.0 06/06/2019 05:38 AM  
  
 BNP No results found for: BNP, BNPP, XBNPT Liver Enzymes Recent Labs  
  06/06/19 
0538 TP 6.0* ALB 2.7* AP 85 SGOT 29  
  
Digoxin Thyroid Studies Lab Results Component Value Date/Time TSH 4.98 12/22/2010 02:00 AM  
    
 
Signed By: Chapiot Hodges NP   
 June 6, 2019

## 2019-06-06 NOTE — PROGRESS NOTES
[facility] Progress Note Patient: Vane Giron Age: 80 y.o. : 1922 MR#: [id number] SSN: [id number] Date/Time: 2019 Subjective: pt feels lot better, min SOB, denies any CP. No other complaints Care giver at bedside. Assessment/Plan: 1. Acute systolic vs diastolic HF, Echo pending, cardiology consulted, cont Diurese with IV lasix. 2. Elevated trop: ? Demand ischemia due to # 1, doubt PE, low probability, given elevated D Dimer, will get LE duplex. V/Q scan not reliable given abnormal CXR. 3. Acute hypoxic Resp failure - likely from #1: off BiPAP, cont O2 and wean as tolerated 4. HTN: BP stable, will cont current med's and monitor 5. H/o CAD with mild elevated trop: plan per cardiology, cont asa, BB and statin 6. Chronic anemia - monitor H&H 7. CKD 3: will cont to monitor creatinine since pt is being diuresed 8. H/o CVA: supportive care 9. Generalized debility: will get PT/OT 10. Moderate malnutrition - Nutrition consult 11. Alz dementia: supportive care DVT Px - Heparin D/w son Brynn Martinez  - POA in detail at bedside, wants DNR, DDNR signed. He wants cont current Rx, no escalation of care. I spent 40 minutes with the patient in face-to-face consultation, of which greater than 50% was spent in counseling and coordination of care as described above. Case discussed with:  [x]Patient  [x]Family  [x]Nursing  []Case Management DVT Prophylaxis:  []Lovenox  [x]Hep SQ  []SCDs  []Coumadin   []On Heparin gtt Objective:  
VS:  
Visit Vitals /68 Pulse 84 Temp 97.4 °F (36.3 °C) Resp 26 Ht 4' 11\" (1.499 m) Wt 55.3 kg (122 lb) SpO2 99% Breastfeeding? No  
BMI 24.64 kg/m² Tmax/24hrs: Temp (24hrs), Av.2 °F (36.8 °C), Min:97.4 °F (36.3 °C), Max:99.5 °F (37.5 °C) IOBRIEF Intake/Output Summary (Last 24 hours) at 2019 1213 Last data filed at 2019 1013 Gross per 24 hour Intake 480 ml Output 1300 ml Net -820 ml General:  Alert, cooperative, no acute distress   
Pulmonary: Bilaterally basal rales. No Wheezing. Cardiovascular: Regular rate and Rhythm. GI:  Soft, Non distended, Non tender. + Bowel sounds. Extremities:  Trace edema. No calf tenderness. Neurologic: Alert and oriented X 1-2. Moves all ext. Additional: 
 
Medications:  
Current Facility-Administered Medications Medication Dose Route Frequency  [START ON 6/8/2019] loratadine (CLARITIN) tablet 10 mg  10 mg Oral EVERY OTHER DAY  aspirin delayed-release tablet 81 mg  81 mg Oral DAILY  felodipine (PLENDIL SR) 24 hr tablet 10 mg  10 mg Oral DAILY  clopidogrel (PLAVIX) tablet 75 mg  75 mg Oral DAILY  cloNIDine HCl (CATAPRES) tablet 0.1 mg  0.1 mg Oral BID  simvastatin (ZOCOR) tablet 20 mg  20 mg Oral QHS  memantine (NAMENDA) tablet 10 mg  10 mg Oral DAILY  carvedilol (COREG) tablet 12.5 mg  12.5 mg Oral BID WITH MEALS  ferrous sulfate tablet 325 mg  325 mg Oral ACB  multivitamin, tx-iron-ca-min (THERA-M w/ IRON) tablet 1 Tab  1 Tab Oral DAILY  donepezil (ARICEPT) tablet 10 mg  10 mg Oral QHS  acetaminophen (TYLENOL) tablet 650 mg  650 mg Oral Q6H PRN  
 ondansetron (ZOFRAN) injection 4 mg  4 mg IntraVENous Q6H PRN  
 sodium chloride (NS) flush 5-40 mL  5-40 mL IntraVENous Q8H  
 sodium chloride (NS) flush 5-40 mL  5-40 mL IntraVENous PRN  
 heparin (porcine) injection 5,000 Units  5,000 Units SubCUTAneous Q8H  
 furosemide (LASIX) injection 40 mg  40 mg IntraVENous BID Labs:   
Recent Results (from the past 24 hour(s)) TROPONIN I Collection Time: 06/05/19 11:52 PM  
Result Value Ref Range Troponin-I, QT 1.10 (H) 0.0 - 5.285 NG/ML  
METABOLIC PANEL, COMPREHENSIVE Collection Time: 06/06/19  5:38 AM  
Result Value Ref Range Sodium 146 (H) 136 - 145 mmol/L Potassium 4.3 3.5 - 5.5 mmol/L  Chloride 108 100 - 108 mmol/L  
 CO2 31 21 - 32 mmol/L  
 Anion gap 7 3.0 - 18 mmol/L Glucose 91 74 - 99 mg/dL BUN 41 (H) 7.0 - 18 MG/DL Creatinine 1.80 (H) 0.6 - 1.3 MG/DL  
 BUN/Creatinine ratio 23 (H) 12 - 20 GFR est AA 32 (L) >60 ml/min/1.73m2 GFR est non-AA 26 (L) >60 ml/min/1.73m2 Calcium 8.8 8.5 - 10.1 MG/DL Bilirubin, total 0.8 0.2 - 1.0 MG/DL  
 ALT (SGPT) 51 13 - 56 U/L  
 AST (SGOT) 29 15 - 37 U/L Alk. phosphatase 85 45 - 117 U/L Protein, total 6.0 (L) 6.4 - 8.2 g/dL Albumin 2.7 (L) 3.4 - 5.0 g/dL Globulin 3.3 2.0 - 4.0 g/dL A-G Ratio 0.8 0.8 - 1.7 LIPID PANEL Collection Time: 06/06/19  5:38 AM  
Result Value Ref Range LIPID PROFILE Cholesterol, total 123 <200 MG/DL Triglyceride 81 <150 MG/DL  
 HDL Cholesterol 62 (H) 40 - 60 MG/DL  
 LDL, calculated 44.8 0 - 100 MG/DL VLDL, calculated 16.2 MG/DL  
 CHOL/HDL Ratio 2.0 0 - 5.0    
CBC W/O DIFF Collection Time: 06/06/19  5:38 AM  
Result Value Ref Range WBC 8.8 4.6 - 13.2 K/uL  
 RBC 3.08 (L) 4.20 - 5.30 M/uL HGB 8.3 (L) 12.0 - 16.0 g/dL HCT 26.4 (L) 35.0 - 45.0 % MCV 85.7 74.0 - 97.0 FL  
 MCH 26.9 24.0 - 34.0 PG  
 MCHC 31.4 31.0 - 37.0 g/dL  
 RDW 16.9 (H) 11.6 - 14.5 % PLATELET 260 178 - 738 K/uL MPV 11.6 9.2 - 11.8 FL  
PTT Collection Time: 06/06/19  5:38 AM  
Result Value Ref Range aPTT 41.1 (H) 23.0 - 36.4 SEC  
TROPONIN I Collection Time: 06/06/19  5:38 AM  
Result Value Ref Range Troponin-I, QT 0.93 (H) 0.0 - 0.045 NG/ML  
ECHO ADULT COMPLETE Collection Time: 06/06/19  8:49 AM  
Result Value Ref Range LA Volume 89.88 22 - 52 mL Ao Root D 2.70 cm LVIDd 3.75 (A) 3.9 - 5.3 cm  
 LVPWd 0.90 0.6 - 0.9 cm LVIDs 2.88 cm IVSd 1.55 (A) 0.6 - 0.9 cm  
 LVOT d 1.95 cm  
 LVOT Peak Velocity 65.23 cm/s LVOT Peak Gradient 1.7 mmHg LVOT VTI 13.04 cm  
 MV A Tad 109.16 cm/s  
 MV E Tad 108.40 cm/s  
 MV E/A 1.00   
 MV Mean Gradient 1.9 mmHg Mitral Valve Annulus Velocity Time Integral 30.35 cm PULV S/D 59.6 Inferior Vena Cava Diameter Sniffing 1.88 cm  
 LA Vol 4C 74.18 (A) 22 - 52 mL  
 LA Vol 2C 82.55 (A) 22 - 52 mL  
 LA Area 4C 24.8 cm2 LV Mass .9 (A) 67 - 162 g  
 LV Mass AL Index 101.8 (A) 43 - 95 g/m2 MV Peak Gradient 5.7 mmHg Mitral Valve E Wave Deceleration Time 223.9 ms  
 Triscuspid Valve Regurgitation Peak Gradient 43.5 mmHg Pulmonary Vein \"A\" Wave Velocity 0.79 cm/s Mitral Valve Max Velocity 118.98 cm/s MVA VTI 1.3 cm2 Pulm Vein Peak D Velocity 0.43 cm/s  
 TR Max Velocity 329.76 cm/s  
 LA Vol Index 60.14 16 - 28 ml/m2 PASP 52.0 mmHg LA Vol Index 55.23 16 - 28 ml/m2 LA Vol Index 49.63 16 - 28 ml/m2 TROPONIN I Collection Time: 06/06/19  9:00 AM  
Result Value Ref Range Troponin-I, QT 0.91 (H) 0.0 - 0.045 NG/ML Signed By: Nuris Martinez MD   
 June 6, 2019

## 2019-06-06 NOTE — PROGRESS NOTES
Problem: Dysphagia (Adult) Goal: *Acute Goals and Plan of Care (Insert Text) Description Dysphagia Present: mild oropharyngeal 
Aspiration: none present during eval   
 
Recommendations: 
Diet: mechanical soft solids, thin liquids Meds: crushed/ whole in puree Aspiration Precautions Oral Care TID Other: possible MBS Goals:  Patient will: 1. Tolerate PO trials with no overt s/s aspiration or distress in 4/5 trials 2. Utilize compensatory swallow strategies/maneuvers (decrease bite/sip, size/rate, alt. liq/sol) with min cues in 4/5 trials 3. Complete an objective swallow study (i.e., MBSS) to assess swallow integrity, r/o aspiration, and determine of safest LRD, min A Outcome: Progressing Towards Goal 
 
SPEECH LANGUAGE PATHOLOGY BEDSIDE SWALLOW EVALUATION/TREATMENT Patient: Matt Bound (41 y.o. female) Date: 6/6/2019 Primary Diagnosis: Acute CHF (congestive heart failure) ([facility] [address] [I50.9] CHF exacerbation ([facility] [address] [I50.9] Precautions: aspiration PLOF: As per H&P 
 
ASSESSMENT : 
Based on the objective data described below, the patient presents with mild oropharyngeal dysphagia in setting of acute renal failure 2* exacerbation of CHF. Per CXR 6/5/19 \"Bilateral pleural effusion, septal line thickening, and cardiomegaly. Suggestive of developing CHF. Differential considerations include bilateral 
pneumonia vs. nonspecific atelectatic changes with pleural effusion. \" 
 
Pt awake, alert, oriented to self, natural dentition, several missing teeth, son and niece present, personal caregiver present initially as well. Per family, pt with intermittent dysphagia at home, more so with thin liquids, and they \"thicken liquids slightly\". Pt was eating soft foods at home prior to admit, if regular solids were given, pt would chew and spit out.   Pt with slight habitus chin tuck, both pt and family deny odynophagia, globus sensation, or overt s/sx aspiration with meals, except occasionally with water. Pt accepted thin via straw and mechanical soft solids with independent small sip, appropriate portion size, mildly prolonged anterior mastication with munch type pattern, slow oral transit, mildly delayed swallow response with weak laryngeal elevation, audible gulp, and no overt s/sx aspiration. Pt SpO2 between  throughout. Rec: downgrade solids to soft, continue thin liquids, aspiration precautions, assist with feeding as needed. SLP will f/u with diet tolerance/ safety. Given recent CXR differentials and occasional dysphagia with thin liquids at home, ?if pt with intermittent aspiration of liquids? If desired, could complete MBS to r/o aspiration and determine swallow function, please write order if that is the case. TREATMENT : 
Skilled therapy initiated; Educated pt family on aspiration precautions and importance of compensatory swallow techniques to decrease aspiration risk (decrease rate of intake, small sip/bite size, alternate solids/ liquids, upright @HOB for all po intake and ~30 minutes after po); family verbalized comprehension. Patient will benefit from skilled intervention to address the above impairments. Patient's rehabilitation potential is considered to be Good Factors which may influence rehabilitation potential include: ? None noted ? Mental ability/status ? Medical condition ? Home/family situation and support systems ? Safety awareness ? Pain tolerance/management ? Other: PLAN : 
Recommendations and Planned Interventions: 
downgrade solids to soft, continue thin liquids, aspiration precautions, assist with feeding as needed. Frequency/Duration: Patient will be followed by speech-language pathology 1-2 times per day/3-5 days per week to address goals. Discharge Recommendations: Home Health and To Be Determined SUBJECTIVE:  
 Patient stated ? I eat what I want? . 
 
OBJECTIVE:  
 
Past Medical History:  
Diagnosis Date Anemia NEC Anxiety Cataract   
 bilat Colon cancer ([facility] [address] 1999 CRI (chronic renal insufficiency) 4/07 CVA (cerebral infarction) ([facility] [address] 4/01  
 right facial droop CVA (cerebral infarction) ([facility] [address] 2/11  
 left post corona radiata Dementia Diverticulosis Edema GERD (gastroesophageal reflux disease) Hypercholesterolemia Hypertension Hypomagnesemia 2/11 Lipoma of neck   
 right Orthostatic hypotension RBBB (right bundle branch block) Syncope 6/08 TIA (transient ischemic attack) mid 1990's Past Surgical History:  
Procedure Laterality Date ENDOSCOPY, COLON, DIAGNOSTIC  2006 HX BREAST BIOPSY HX CATARACT REMOVAL  10/02  
 left HX CATARACT REMOVAL  6/03  
 right HX COLECTOMY  1999  
 right hemicolectomy HX HYSTERECTOMY HX LIPOMA RESECTION Prior Level of Function/Home Situation: as per H&P and above Home Situation Home Environment: Private residence # Steps to Enter: 5 One/Two Story Residence: One story Living Alone: No 
Support Systems: Family member(s) Patient Expects to be Discharged to[de-identified] Private residence Current DME Used/Available at Home: Commode, bedside, Walker, Raised toilet seat Diet prior to admission: soft solids/ thin, occasional \"thickened\" liquids Current Diet:  regular/ thin  
Cognitive and Communication Status: 
Neurologic State: Alert Orientation Level: Oriented to person Cognition: Follows commands Perception: Appears intact Perseveration: No perseveration noted Safety/Judgement: Fall prevention Oral Assessment: 
Oral Assessment Labial: No impairment Dentition: Limited;Natural 
Oral Hygiene: good Lingual: No impairment Velum: No impairment Mandible: No impairment P.O. Trials: 
Patient Position: [facility] 55* Vocal quality prior to P.O.: No impairment Consistency Presented: Mechanical soft; Thin liquid How Presented: SLP-fed/presented;Self-fed/presented;Straw Bolus Acceptance: No impairment Bolus Formation/Control: Impaired Type of Impairment: Mastication; Anterior Propulsion: Delayed (# of seconds) Oral Residue: Less than 10% of bolus; Lingual;Anterior Initiation of Swallow: Delayed (# of seconds) Laryngeal Elevation: Weak Aspiration Signs/Symptoms: None Pharyngeal Phase Characteristics: Audible swallow Effective Modifications: Alternate liquids/solids;Small sips and bites(pt with habitus chin tuck posture) Cues for Modifications: Minimal 
  
 
Oral Phase Severity: Mild Pharyngeal Phase Severity : Mild PAIN: 
Start of Eval: 0 End of Eval: 0 After treatment:  
?            Patient left in no apparent distress sitting up in chair ? Patient left in no apparent distress in bed ? Call bell left within reach ? Nursing notified ? Family present ? Caregiver present ? Bed alarm activated COMMUNICATION/EDUCATION:  
?            Aspiration precautions; swallow safety; compensatory techniques. ?            Patient/family have participated as able in goal setting and plan of care. ?            Patient/family agree to work toward stated goals and plan of care. ?            Patient understands intent and goals of therapy; neutral about participation. ? Patient unable to participate in goal setting/plan of care; educ ongoing with interdisciplinary staff ? Posted safety precautions in patient's room. Thank you for this referral, 
Adriana Gloria MS, CCC/SLP Time Calculation: 24 mins: 
Eval: 14 minutes Tx: 10 minutes

## 2019-06-06 NOTE — PROGRESS NOTES
Bedside shift change report given to Elgin Ruff RN (oncoming nurse) by Bk Tamayo RN (offgoing nurse). Report included the following information SBAR, Kardex, Intake/Output, MAR, Recent Results and Cardiac Rhythm NSR.

## 2019-06-06 NOTE — PROGRESS NOTES
06/05/19 2257 Vitals Temp 97.8 °F (36.6 °C) Temp Source Oral  
Pulse (Heart Rate) (!) 101 Resp Rate (!) 35  
O2 Sat (%) 98 % Level of Consciousness Alert /66 MAP (Monitor) 97 BP 1 Location Left arm BP 1 Method Automatic  
BP Patient Position At rest  
MEWS Score 4 Height/Weight Height 4' 11\" (1.499 m) Weight 55.3 kg (122 lb) BSA (calculated - sq m) 1.52 sq meters BMI (calculated) 24.6 Oxygen Therapy Pulse via Oximetry 101 beats per minute Pain 1 Pain Scale 1 Adult Nonverbal Pain Scale Pain Intensity 1 0 Patient Stated Pain Goal 0 Adult Nonverbal Pain Scale Face 0 Activity (Movement) 0 Guarding 0 Physiology (Vital Signs) 0 Respiratory 0 Total Score 0  
 
2257 Pt arrived to [address] [place] [address] from [facility] ED, pt alert to self only per caregiver this is her baseline. Pt with limited movements on all extremities but able to walk with walker. Oriented pt and caregiver to surroundings, bed in lowest position/wheels locked. MD aware that pt is already in unit. Skin man completed with Erika Sullivan RN. Pt also on bipap when she arrived. 2330 Per caregiver, pt did not have any food yet requesting if she can eat something. Took bipap off and placed on 4L, spoke with Dr. Shala Chiang states it is ok to have bipap off so pt can eat and place again in an hour. Will cont to monitor pt.

## 2019-06-07 ENCOUNTER — APPOINTMENT (OUTPATIENT)
Dept: GENERAL RADIOLOGY | Age: 84
DRG: 291 | End: 2019-06-07
Attending: INTERNAL MEDICINE
Payer: MEDICARE

## 2019-06-07 LAB
ANION GAP SERPL CALC-SCNC: 5 MMOL/L (ref 3–18)
APPEARANCE UR: CLEAR
BACTERIA URNS QL MICRO: ABNORMAL /HPF
BASOPHILS # BLD: 0 K/UL (ref 0–0.1)
BASOPHILS NFR BLD: 0 % (ref 0–2)
BILIRUB UR QL: NEGATIVE
BUN SERPL-MCNC: 44 MG/DL (ref 7–18)
BUN/CREAT SERPL: 23 (ref 12–20)
CALCIUM SERPL-MCNC: 8.6 MG/DL (ref 8.5–10.1)
CHLORIDE SERPL-SCNC: 105 MMOL/L (ref 100–108)
CO2 SERPL-SCNC: 32 MMOL/L (ref 21–32)
COLOR UR: YELLOW
CREAT SERPL-MCNC: 1.92 MG/DL (ref 0.6–1.3)
DIFFERENTIAL METHOD BLD: ABNORMAL
EOSINOPHIL # BLD: 0.1 K/UL (ref 0–0.4)
EOSINOPHIL NFR BLD: 2 % (ref 0–5)
EPITH CASTS URNS QL MICRO: NEGATIVE /LPF (ref 0–5)
ERYTHROCYTE [DISTWIDTH] IN BLOOD BY AUTOMATED COUNT: 16.6 % (ref 11.6–14.5)
GLUCOSE SERPL-MCNC: 78 MG/DL (ref 74–99)
GLUCOSE UR STRIP.AUTO-MCNC: NEGATIVE MG/DL
HCT VFR BLD AUTO: 23.8 % (ref 35–45)
HGB BLD-MCNC: 7.5 G/DL (ref 12–16)
HGB UR QL STRIP: ABNORMAL
KETONES UR QL STRIP.AUTO: NEGATIVE MG/DL
LEUKOCYTE ESTERASE UR QL STRIP.AUTO: ABNORMAL
LYMPHOCYTES # BLD: 1.5 K/UL (ref 0.9–3.6)
LYMPHOCYTES NFR BLD: 22 % (ref 21–52)
MAGNESIUM SERPL-MCNC: 2.1 MG/DL (ref 1.6–2.6)
MCH RBC QN AUTO: 26.8 PG (ref 24–34)
MCHC RBC AUTO-ENTMCNC: 31.5 G/DL (ref 31–37)
MCV RBC AUTO: 85 FL (ref 74–97)
MONOCYTES # BLD: 0.7 K/UL (ref 0.05–1.2)
MONOCYTES NFR BLD: 10 % (ref 3–10)
NEUTS SEG # BLD: 4.4 K/UL (ref 1.8–8)
NEUTS SEG NFR BLD: 66 % (ref 40–73)
NITRITE UR QL STRIP.AUTO: POSITIVE
PH UR STRIP: 6 [PH] (ref 5–8)
PLATELET # BLD AUTO: 160 K/UL (ref 135–420)
PLATELET COMMENTS,PCOM: ABNORMAL
PMV BLD AUTO: 11.4 FL (ref 9.2–11.8)
POTASSIUM SERPL-SCNC: 4 MMOL/L (ref 3.5–5.5)
PROT UR STRIP-MCNC: NEGATIVE MG/DL
RBC # BLD AUTO: 2.8 M/UL (ref 4.2–5.3)
RBC #/AREA URNS HPF: ABNORMAL /HPF (ref 0–5)
RBC MORPH BLD: ABNORMAL
RBC MORPH BLD: ABNORMAL
SODIUM SERPL-SCNC: 142 MMOL/L (ref 136–145)
SP GR UR REFRACTOMETRY: 1.01 (ref 1–1.03)
UROBILINOGEN UR QL STRIP.AUTO: 0.2 EU/DL (ref 0.2–1)
WBC # BLD AUTO: 6.7 K/UL (ref 4.6–13.2)
WBC URNS QL MICRO: ABNORMAL /HPF (ref 0–4)

## 2019-06-07 PROCEDURE — 74011250636 HC RX REV CODE- 250/636: Performed by: HOSPITALIST

## 2019-06-07 PROCEDURE — 74011250636 HC RX REV CODE- 250/636: Performed by: NURSE PRACTITIONER

## 2019-06-07 PROCEDURE — 85025 COMPLETE CBC W/AUTO DIFF WBC: CPT

## 2019-06-07 PROCEDURE — 36415 COLL VENOUS BLD VENIPUNCTURE: CPT

## 2019-06-07 PROCEDURE — 87040 BLOOD CULTURE FOR BACTERIA: CPT

## 2019-06-07 PROCEDURE — 94762 N-INVAS EAR/PLS OXIMTRY CONT: CPT

## 2019-06-07 PROCEDURE — 97530 THERAPEUTIC ACTIVITIES: CPT

## 2019-06-07 PROCEDURE — 97166 OT EVAL MOD COMPLEX 45 MIN: CPT

## 2019-06-07 PROCEDURE — 74011250637 HC RX REV CODE- 250/637: Performed by: HOSPITALIST

## 2019-06-07 PROCEDURE — 83735 ASSAY OF MAGNESIUM: CPT

## 2019-06-07 PROCEDURE — 65660000000 HC RM CCU STEPDOWN

## 2019-06-07 PROCEDURE — 97535 SELF CARE MNGMENT TRAINING: CPT

## 2019-06-07 PROCEDURE — 81001 URINALYSIS AUTO W/SCOPE: CPT

## 2019-06-07 PROCEDURE — 92526 ORAL FUNCTION THERAPY: CPT

## 2019-06-07 PROCEDURE — 97162 PT EVAL MOD COMPLEX 30 MIN: CPT

## 2019-06-07 PROCEDURE — 97116 GAIT TRAINING THERAPY: CPT

## 2019-06-07 PROCEDURE — 80048 BASIC METABOLIC PNL TOTAL CA: CPT

## 2019-06-07 PROCEDURE — 71045 X-RAY EXAM CHEST 1 VIEW: CPT

## 2019-06-07 PROCEDURE — 77010033678 HC OXYGEN DAILY

## 2019-06-07 RX ORDER — FUROSEMIDE 40 MG/1
40 TABLET ORAL DAILY
Status: DISCONTINUED | OUTPATIENT
Start: 2019-06-08 | End: 2019-06-11 | Stop reason: HOSPADM

## 2019-06-07 RX ORDER — MEMANTINE HYDROCHLORIDE 21 MG/1
21 CAPSULE, EXTENDED RELEASE ORAL DAILY
COMMUNITY
End: 2020-01-01

## 2019-06-07 RX ORDER — ALLOPURINOL 100 MG/1
100 TABLET ORAL
COMMUNITY

## 2019-06-07 RX ORDER — LORATADINE 10 MG/1
10 TABLET ORAL
COMMUNITY

## 2019-06-07 RX ORDER — DIAZEPAM 5 MG/1
5 TABLET ORAL
COMMUNITY
End: 2019-01-01 | Stop reason: DRUGHIGH

## 2019-06-07 RX ORDER — FUROSEMIDE 10 MG/ML
40 INJECTION INTRAMUSCULAR; INTRAVENOUS 2 TIMES DAILY
Status: COMPLETED | OUTPATIENT
Start: 2019-06-07 | End: 2019-06-07

## 2019-06-07 RX ORDER — ATORVASTATIN CALCIUM 20 MG/1
20 TABLET, FILM COATED ORAL EVERY EVENING
COMMUNITY

## 2019-06-07 RX ADMIN — MULTIPLE VITAMINS W/ MINERALS TAB 1 TABLET: TAB at 09:55

## 2019-06-07 RX ADMIN — SIMVASTATIN 20 MG: 20 TABLET, FILM COATED ORAL at 22:31

## 2019-06-07 RX ADMIN — FUROSEMIDE 40 MG: 10 INJECTION, SOLUTION INTRAMUSCULAR; INTRAVENOUS at 18:01

## 2019-06-07 RX ADMIN — HEPARIN SODIUM 5000 UNITS: 5000 INJECTION INTRAVENOUS; SUBCUTANEOUS at 22:32

## 2019-06-07 RX ADMIN — CLOPIDOGREL BISULFATE 75 MG: 75 TABLET, FILM COATED ORAL at 09:55

## 2019-06-07 RX ADMIN — CARVEDILOL 12.5 MG: 12.5 TABLET, FILM COATED ORAL at 09:55

## 2019-06-07 RX ADMIN — HEPARIN SODIUM 5000 UNITS: 5000 INJECTION INTRAVENOUS; SUBCUTANEOUS at 18:02

## 2019-06-07 RX ADMIN — CARVEDILOL 12.5 MG: 12.5 TABLET, FILM COATED ORAL at 18:02

## 2019-06-07 RX ADMIN — FUROSEMIDE 40 MG: 10 INJECTION, SOLUTION INTRAMUSCULAR; INTRAVENOUS at 09:55

## 2019-06-07 RX ADMIN — ASPIRIN 81 MG: 81 TABLET, COATED ORAL at 09:55

## 2019-06-07 RX ADMIN — FELODIPINE 10 MG: 5 TABLET, EXTENDED RELEASE ORAL at 09:55

## 2019-06-07 RX ADMIN — FERROUS SULFATE TAB 325 MG (65 MG ELEMENTAL FE) 325 MG: 325 (65 FE) TAB at 09:56

## 2019-06-07 RX ADMIN — Medication 10 ML: at 23:13

## 2019-06-07 RX ADMIN — CLONIDINE HYDROCHLORIDE 0.1 MG: 0.1 TABLET ORAL at 18:02

## 2019-06-07 RX ADMIN — Medication 10 ML: at 06:00

## 2019-06-07 RX ADMIN — HEPARIN SODIUM 5000 UNITS: 5000 INJECTION INTRAVENOUS; SUBCUTANEOUS at 10:03

## 2019-06-07 RX ADMIN — DONEPEZIL HYDROCHLORIDE 10 MG: 5 TABLET, FILM COATED ORAL at 22:32

## 2019-06-07 RX ADMIN — Medication 10 ML: at 18:03

## 2019-06-07 RX ADMIN — MEMANTINE 10 MG: 10 TABLET ORAL at 09:55

## 2019-06-07 RX ADMIN — CLONIDINE HYDROCHLORIDE 0.1 MG: 0.1 TABLET ORAL at 10:03

## 2019-06-07 NOTE — PROGRESS NOTES
[facility] - Progress Note Admit Date: 6/5/2019 I saw, evaluated, interviewed and examined the patient personally. I agree with the findings and plan of care as documented below with PAStephanieC note Respiratory distress with elevated BN P. Possibility of diastolic congestive heart failure. Elevated d-dimer. Echo with normal ejection fraction Agree with 1 more IV Lasix and then switched to oral Lasix from tomorrow. Good diuresis so far Patient symptomatically feeling better. Son is eager to take patient back home Continue current cardiac medications No further cardiac work up planned at this time unless clinical status changes. Call us back if needed. Will be available as needed. Will need to follow up in cardiology clinic in 2-3 weeks after discharge. Thank you. Antolin Lion MD 
 
 
 
Assessment: - Acute Congestive Heart Failure - Unclear duration; BNP elevated ion admission (>25K), CXR reveals developing CHF;.Previous echocardiogram not available to review in Epic; Patient placed on BiPAP on admission. Family at bedside denies any previous MI or diagnosis of CHF 
- Elevated Troponin (0.43, 1.10, 0.93); Possibly indeterminate in setting of acute heart failure, though concerning for PE given elevated D-dimer (2.93). Patient denies chest pain, EKG reveals chronic RBBB, with no acute ischemic changes - Essential Hypertension - Elevated on arrival, sub-optimal control - Hyperlipidemia - On statin 
- History of CVA (2015) - Chronic Renal Insufficiency - Admission SCr 1.9, baseline 1.5-1.6 
- History of Dementia - On namenda - DNR status 
  
No primary Cardiologist. 
  
Plan: - Echocardiogram (6/6/19) reveals EF 55%; moderate TR, pHTN, severely dilated LA, left pleural effusion 
- Continue conservative medical management with ASA, Plavix Statin, beta blocker - Continue diuresis, I/Os, daily weight; transition to PO soon 
- Continue clonidine; optimize as BP tolerates - No further cardiac recommendations at this time Subjective:  
 
Stable on 2L O2 overnight; no acute overnight events Objective:  
  
Patient Vitals for the past 8 hrs: 
 Temp Pulse Resp BP SpO2  
06/07/19 0800 98.3 °F (36.8 °C) 75 30 145/55 98 % 06/07/19 0400 99 °F (37.2 °C) 66 21 129/48 99 % Patient Vitals for the past 96 hrs: 
 Weight 06/06/19 0814 122 lb (55.3 kg) 06/05/19 2257 122 lb (55.3 kg) 06/05/19 1002 145 lb 8 oz (66 kg) Intake/Output Summary (Last 24 hours) at 6/7/2019 4596 Last data filed at 6/7/2019 0400 Gross per 24 hour Intake 1020 ml Output 1675 ml Net -655 ml Physical Exam: 
General:  alert, cooperative, no distress, appears stated age Neck:  nontender Lungs:  diminished breath sounds R anterior, R base, L anterior, L base Heart:  regular rate and rhythm, S1, S2 normal, no murmur, click, rub or gallop Abdomen:  abdomen is soft without significant tenderness, masses, organomegaly or guarding Extremities:  extremities normal, atraumatic, no cyanosis; trace BLE edema Data Review:  
 
Labs: Results:  
   
Chemistry Recent Labs  
  06/07/19 0532 06/06/19 0538 06/05/19 
1000 GLU 78 91 171*  146* 144  
K 4.0 4.3 4.8  
 108 108 CO2 32 31 29 BUN 44* 41* 39* CREA 1.92* 1.80* 1.90* CA 8.6 8.8 9.4 MG 2.1  --   --   
AGAP 5 7 7 BUCR 23* 23* 21* AP  --  85  --   
TP  --  6.0*  --   
ALB  --  2.7*  --   
GLOB  --  3.3  --   
AGRAT  --  0.8  --   
  
CBC w/Diff Recent Labs  
  06/07/19 
0532 06/06/19 
0538 06/05/19 
1000 WBC 6.7 8.8 11.7 RBC 2.80* 3.08* 3.57* HGB 7.5* 8.3* 9.8* HCT 23.8* 26.4* 31.2*  
 175 192 GRANS PENDING  --  79* LYMPH PENDING  --  12* EOS PENDING  --  1 Cardiac Enzymes No results found for: CPK, CK, CKMMB, CKMB, RCK3, CKMBT, CKNDX, CKND1, FELTON, TROPT, TROIQ, SHANIKA, TROPT, TNIPOC, BNP, BNPP Coagulation Recent Labs  
  06/06/19 
0538 APTT 41.1*  
   
 Lipid Panel Lab Results Component Value Date/Time Cholesterol, total 123 06/06/2019 05:38 AM  
 HDL Cholesterol 62 (H) 06/06/2019 05:38 AM  
 LDL, calculated 44.8 06/06/2019 05:38 AM  
 VLDL, calculated 16.2 06/06/2019 05:38 AM  
 Triglyceride 81 06/06/2019 05:38 AM  
 CHOL/HDL Ratio 2.0 06/06/2019 05:38 AM  
  
BNP No results found for: BNP, BNPP, XBNPT Liver Enzymes Recent Labs  
  06/06/19 
0538 TP 6.0* ALB 2.7* AP 85 SGOT 29  
  
Digoxin Thyroid Studies Lab Results Component Value Date/Time TSH 4.98 12/22/2010 02:00 AM  
    
 
Signed By: Adam Wiseman NP   
 June 7, 2019

## 2019-06-07 NOTE — PROGRESS NOTES
TRANSFER - IN REPORT: 
 
Verbal report given to PATT Marquez(name) on [address] [facility]  being received from [facility]T-sd(unit) for routine progression of care Report consisted of patients Situation, Background, Assessment and  
Recommendations(SBAR). Information from the following report(s) SBAR, Kardex, ED Summary and MAR was reviewed with the receiving nurse. Opportunity for questions and clarification was provided. Assessment completed upon patients arrival to unit and care assumed.

## 2019-06-07 NOTE — PROGRESS NOTES
Problem: Dysphagia (Adult) Goal: *Acute Goals and Plan of Care (Insert Text) Description Dysphagia Present: mild oropharyngeal 
Aspiration: none present during eval   
 
Recommendations: 
Diet: mechanical soft solids, thin liquids Meds: crushed/ whole in puree Aspiration Precautions Oral Care TID Other: possible MBS Goals:  Patient will: 1. Tolerate PO trials with no overt s/s aspiration or distress in 4/5 trials 2. Utilize compensatory swallow strategies/maneuvers (decrease bite/sip, size/rate, alt. liq/sol) with min cues in 4/5 trials 3. Complete an objective swallow study (i.e., MBSS) to assess swallow integrity, r/o aspiration, and determine of safest LRD, min A Outcome: Progressing Towards Goal 
 
SPEECH LANGUAGE PATHOLOGY DYSPHAGIA TREATMENT Patient: Vamshi Sosa (4 y.o. female) Date: 6/7/2019 Diagnosis: Acute CHF (congestive heart failure) ([facility] [address] [I50.9] CHF exacerbation ([facility] [address] [I50.9] <principal problem not specified> Precautions: Aspiration Fall PLOF: Living with family ASSESSMENT: 
Followed up with [facility]-soft, thin liquid skilled meal assessment. Pt self feeding with multiple family members at bedside. Demo moderately delayed oral bolus preparation with solids. Required frequent verbal cues to reduce size of intake. Patient's family reports pt is currently on baseline solids. 0 overt s/sx of aspiration observed across all trials. Recommend patient continue mech-soft, thin liquid diet with aspiration precautions, slow rate, supervision, small bites/sips. Educated pt on aspiration precautions and importance of compensatory swallow techniques to decrease aspiration risk (decrease rate of intake & sip/bite size, upright @HOB for all po intake and ~30 minutes after po); verbalized comprehension. SLP will continue to follow. Progression toward goals: 
?         Improving appropriately and progressing toward goals ? Improving slowly and progressing toward goals ?         Not making progress toward goals and plan of care will be adjusted PLAN: 
Recommendations and Planned Interventions: 
Mech-soft/thin Patient continues to benefit from skilled intervention to address the above impairments. Continue treatment per established plan of care. Discharge Recommendations:  Home Health SUBJECTIVE:  
Patient stated ? Zainab? . 
 
OBJECTIVE:  
Cognitive and Communication Status: 
Neurologic State: Alert Orientation Level: Oriented X4 Cognition: Follows commands Perception: Appears intact Perseveration: No perseveration noted Safety/Judgement: Fall prevention Dysphagia Treatment: 
Oral Assessment: 
Oral Assessment Labial: No impairment Dentition: Limited, Natural 
Oral Hygiene: good Lingual: No impairment Velum: No impairment Mandible: No impairment P.O. Trials: 
Patient Position: HOB 55* Vocal quality prior to P.O.: No impairment Consistency Presented: Mechanical soft, Thin liquid How Presented: SLP-fed/presented, Self-fed/presented, Straw Bolus Acceptance: No impairment Bolus Formation/Control: Impaired Type of Impairment: Mastication, Anterior Propulsion: Delayed (# of seconds) Oral Residue: Less than 10% of bolus, Lingual, Anterior Initiation of Swallow: Delayed (# of seconds) Laryngeal Elevation: Weak Aspiration Signs/Symptoms: None Pharyngeal Phase Characteristics: Audible swallow Effective Modifications: Alternate liquids/solids, Small sips and bites(pt with habitus chin tuck posture) Cues for Modifications: Minimal 
   
 
 Oral Phase Severity: Mild Pharyngeal Phase Severity : Mild PAIN: 
Pain level pre-treatment: 0/10 Pain level post-treatment: 0/10 After treatment:  
?              Patient left in no apparent distress sitting up in chair ? Patient left in no apparent distress in bed 
? Call bell left within reach ? Nursing notified ? Family present ?              Caregiver present ? Bed alarm activated COMMUNICATION/EDUCATION:  
? Aspiration precautions; swallow safety; compensatory techniques ? Patient unable to participate in education; education ongoing with staff ? Posted safety precautions in patient's room. ? Oral-motor/laryngeal strengthening exercises ELLYN Kent Time Calculation: 10 mins

## 2019-06-07 NOTE — PROGRESS NOTES
2100 decreased 02 from 4L to 2L NC, pt satting high 90's 0600 Pt stayed on 2L NC the whole night and tolerated fine. Bedside shift change report given to Gerry Curling, RN (oncoming nurse) by [place] Jamil RN (offgoing nurse). Report included the following information SBAR, Kardex, Intake/Output, MAR, Recent Results and Cardiac Rhythm NSR.

## 2019-06-07 NOTE — NURSE NAVIGATOR
Transitional Care Team: Initial HUG Note Date of Assessment: 06/07/19 Time of Assessment:  11:20 AM 
 
Matt Fitch is a 80 y.o. [facility] Assessment & Plan  
-Social: her grandson lives with her. She was independent prior to admission. She has a private pay caregiver 7 days per week from 9 AM- 9 PM. She has a walker & WC. Transportation is provided by her family.  
-Cardiac diet- not following; she was eating a regular soft diet as per family; ST recommends Mech. Soft solids, thin liquids; meds. Crushed or whole in puree- this NN encouraged to family 
-D. Dimer elevated- BLE venous duplex- negative for DVT 
-CHF educational flyer provided; discussed with family (patient's son & niece); patient has dementia 
-received influenza vaccine last year; PNA vaccine in last 5 yrs. -no scale; family will purchase one; recommended daily weights and notified of weight gain parameters to report to cardiologist 
-no primary cardiologist, but family agreed to follow up with cardiovascular specialists Primary Diagnosis: Acute Congestive Heart Failure, proBNP 25,638, elevated troponin Advance Directive:  not on file, but pt does not have capacity to complete at this time; PMHx: of dementia. Current Code Status:  DNR during Admission Referral to Hospice/ Palliative Care Appropriate: no. 
 
Awareness of Medical Conditions: (Trajectory of illness and pts expectations). The patient's family is aware. Discharge Needs: (to include safety issues) Family refused SNF; they want to take her home with home health; this NN updated Nasim . Barriers Identified: None at this time; she has a supportive family and private duty aide (1 person) Family is willing to allow patient to go to SNF/Inpatient Rehab if recommended: no! Medication Review:  was not performed, awaiting final med list on AVS. 
 
Can patient afford medications:  yes. Patient is Compliant with Medication regimen: yes Who manages medications at home: Family and/or private duty aide [place] Patient Contact Number: [phone] HUG (Healthy Understanding of Goals) program introduced to patient/family. The Transitional Care Team bridges the gaps in care and education surrounding discharge from the acute care facility. The objective is to empower the patient and family in taking a proactive role in preventing readmission within the first thirty days after discharge. The team is also involved in the efforts to reduce readmission to the acute care setting after stabilization and discharge from the acute care environment either to skilled nursing facilities or community. G RN/CNS will return with [facility] Calendar/ follow up appointments/ Ambulatory Nurse Navigator name and contact information when the patient is ready for discharge. No future appointments. Non-MG follow up appointment(s): to be determined Patient education focused on readmission zones as described as: The Red Zone: High risk for readmission, days 1-21 The Yellow Zone: Moderate  risk for readmission, days 22-29 The Green Zone: Lower risk for readmission, days 30 and after The [facility] Team will attempt to follow the patient from a distance while inpatient as well as be available for further transition/disposition needs. The [facility] team will continue to offer support during the 30- 90 day discharge from acute care setting. Notified the appropriate SHAY team members. Past Medical History:  
Diagnosis Date  Anemia NEC  Anxiety  Cataract   
 bilat  Colon cancer ([address] 1999  CRI (chronic renal insufficiency) 4/07  CVA (cerebral infarction) ([address] 4/01  
 right facial droop  CVA (cerebral infarction) ([address] 2/11  
 left post corona radiata  Dementia  Diverticulosis  Edema  GERD (gastroesophageal reflux disease)  Hypercholesterolemia  Hypertension  Hypomagnesemia 2/11  Lipoma of neck   
 right  Orthostatic hypotension  RBBB (right bundle branch block)  Syncope 6/08  TIA (transient ischemic attack) mid 1990's  
 
 
Nunu Mclean MSN, RN, [facility] Nurse Navigator [phone]

## 2019-06-07 NOTE — PROGRESS NOTES
Pt is alert, watching TV. Denies pain or nausea. Breakfast consumed with no issues, family at bedside, pleasant, attentive to pt. Daily morning meds given to pt one at a time with sips of water. Pt tolerated well. Pham cath intact, anchor in place, draining yellow clear urine freely. Will continue to monitor.

## 2019-06-07 NOTE — PROGRESS NOTES
Bedside and Verbal shift change report given to paloma (oncoming nurse) by Yahir Cardozo (offgoing nurse). Report included the following information SBAR, Kardex, ED Summary, Procedure Summary, Intake/Output, MAR, Recent Results and Med Rec Status.

## 2019-06-07 NOTE — PROGRESS NOTES
Received patient from CVT via bed. A&O x1. Denies having any pain. No shortness of breath noted. On O2 @l/L via n/c. Family at bedside. Family expressed concerns about patient being so far away from the nursing station. Informed family that patient would be moved as soon the discharge and cleaning was completed in room 455.

## 2019-06-07 NOTE — PROGRESS NOTES
[facility] Hospitalist Group Progress Note Patient: Corina Agrawal Age: 80 y.o. : 1922 MR#: [id number] SSN: [id number] Date/Time: 2019 Subjective: pt feels lot better, no SOB, denies any CP. No other complaints  
son at bedside. Pt at baseline mentally per son Assessment/Plan: 1. Acute diastolic HF, Echo noted, EF 55%, cardiology in put noted, cont Diurese with IV lasix today and then PO lasix in am. 2.4 L neg balance 2. Elevated trop: ? Demand ischemia due to # 1, doubt PE, neg LE duplex for DVT. No need for any further work up. 3. Acute hypoxic Resp failure - likely from #1: better, off BiPAP, cont O2 and wean as tolerated 4. Anemia with down trending H/H: no signs of bleeding, will get stool occult and monitor 5. HTN: BP stable, will cont current med's and monitor 6. H/o CAD with mild elevated trop: plan per cardiology, cont asa, BB and statin 7. Chronic anemia - monitor H&H 8. CKD 3: will cont to monitor creatinine since pt is being diuresed 9. H/o CVA: supportive care 10. Generalized debility: cont PT/OT 11. Moderate malnutrition - Nutrition consult 12. Alz dementia: supportive care 13. DNR  
DVT Px - Heparin D/w son Derrick Mora  - POA in detail at bedside, updated all test results and plan care. Dispo: home with Aneudyadamrony Barba at discharge per son Home in 1-2 days Case discussed with:  [x]Patient  [x]Family  [x]Nursing  []Case Management DVT Prophylaxis:  []Lovenox  [x]Hep SQ  []SCDs  []Coumadin   []On Heparin gtt Objective:  
VS:  
Visit Vitals /62 (BP 1 Location: Left arm, BP Patient Position: At rest) Pulse 73 Temp 98 °F (36.7 °C) Resp 28 Ht 4' 11\" (1.499 m) Wt 55.3 kg (122 lb) SpO2 97% Breastfeeding? No  
BMI 24.64 kg/m² Tmax/24hrs: Temp (24hrs), Av.3 °F (36.8 °C), Min:97.6 °F (36.4 °C), Max:99 °F (37.2 °C) IOBRIEF Intake/Output Summary (Last 24 hours) at 2019 9299 Last data filed at 6/7/2019 9432 Gross per 24 hour Intake 540 ml Output 2225 ml Net -1685 ml General:  Alert, cooperative, no acute distress   
Pulmonary: Bilaterally min basal rales. No Wheezing. Cardiovascular: Regular rate and Rhythm. GI:  Soft, Non distended, Non tender. + Bowel sounds. Extremities:  no edema. No calf tenderness. Neurologic: Alert and oriented X 1-2. Moves all ext. Additional: 
 
Medications:  
Current Facility-Administered Medications Medication Dose Route Frequency  furosemide (LASIX) injection 40 mg  40 mg IntraVENous BID  [START ON 6/8/2019] furosemide (LASIX) tablet 40 mg  40 mg Oral DAILY  [START ON 6/8/2019] loratadine (CLARITIN) tablet 10 mg  10 mg Oral EVERY OTHER DAY  aspirin delayed-release tablet 81 mg  81 mg Oral DAILY  felodipine (PLENDIL SR) 24 hr tablet 10 mg  10 mg Oral DAILY  clopidogrel (PLAVIX) tablet 75 mg  75 mg Oral DAILY  cloNIDine HCl (CATAPRES) tablet 0.1 mg  0.1 mg Oral BID  simvastatin (ZOCOR) tablet 20 mg  20 mg Oral QHS  memantine (NAMENDA) tablet 10 mg  10 mg Oral DAILY  carvedilol (COREG) tablet 12.5 mg  12.5 mg Oral BID WITH MEALS  ferrous sulfate tablet 325 mg  325 mg Oral ACB  multivitamin, tx-iron-ca-min (THERA-M w/ IRON) tablet 1 Tab  1 Tab Oral DAILY  donepezil (ARICEPT) tablet 10 mg  10 mg Oral QHS  acetaminophen (TYLENOL) tablet 650 mg  650 mg Oral Q6H PRN  
 ondansetron (ZOFRAN) injection 4 mg  4 mg IntraVENous Q6H PRN  
 sodium chloride (NS) flush 5-40 mL  5-40 mL IntraVENous Q8H  
 sodium chloride (NS) flush 5-40 mL  5-40 mL IntraVENous PRN  
 heparin (porcine) injection 5,000 Units  5,000 Units SubCUTAneous Q8H Labs:   
Recent Results (from the past 24 hour(s)) METABOLIC PANEL, BASIC Collection Time: 06/07/19  5:32 AM  
Result Value Ref Range Sodium 142 136 - 145 mmol/L Potassium 4.0 3.5 - 5.5 mmol/L  Chloride 105 100 - 108 mmol/L  
 CO2 32 21 - 32 mmol/L  
 Anion gap 5 3.0 - 18 mmol/L Glucose 78 74 - 99 mg/dL BUN 44 (H) 7.0 - 18 MG/DL Creatinine 1.92 (H) 0.6 - 1.3 MG/DL  
 BUN/Creatinine ratio 23 (H) 12 - 20 GFR est AA 29 (L) >60 ml/min/1.73m2 GFR est non-AA 24 (L) >60 ml/min/1.73m2 Calcium 8.6 8.5 - 10.1 MG/DL  
CBC WITH AUTOMATED DIFF Collection Time: 06/07/19  5:32 AM  
Result Value Ref Range WBC 6.7 4.6 - 13.2 K/uL  
 RBC 2.80 (L) 4.20 - 5.30 M/uL HGB 7.5 (L) 12.0 - 16.0 g/dL HCT 23.8 (L) 35.0 - 45.0 % MCV 85.0 74.0 - 97.0 FL  
 MCH 26.8 24.0 - 34.0 PG  
 MCHC 31.5 31.0 - 37.0 g/dL  
 RDW 16.6 (H) 11.6 - 14.5 % PLATELET 047 874 - 372 K/uL MPV 11.4 9.2 - 11.8 FL  
 NEUTROPHILS 66 40 - 73 % LYMPHOCYTES 22 21 - 52 % MONOCYTES 10 3 - 10 % EOSINOPHILS 2 0 - 5 % BASOPHILS 0 0 - 2 %  
 ABS. NEUTROPHILS 4.4 1.8 - 8.0 K/UL  
 ABS. LYMPHOCYTES 1.5 0.9 - 3.6 K/UL  
 ABS. MONOCYTES 0.7 0.05 - 1.2 K/UL  
 ABS. EOSINOPHILS 0.1 0.0 - 0.4 K/UL  
 ABS. BASOPHILS 0.0 0.0 - 0.1 K/UL  
 DF SMEAR SCANNED    
 PLATELET COMMENTS ADEQUATE PLATELETS    
 RBC COMMENTS ANISOCYTOSIS 1+ 
    
 RBC COMMENTS HYPOCHROMIA 2+ MAGNESIUM Collection Time: 06/07/19  5:32 AM  
Result Value Ref Range Magnesium 2.1 1.6 - 2.6 mg/dL Signed By: Chris Crooks MD   
 June 7, 2019

## 2019-06-07 NOTE — PROGRESS NOTES
Problem: Self Care Deficits Care Plan (Adult) Goal: *Acute Goals and Plan of Care (Insert Text) Description Occupational Therapy Goals Initiated 6/7/2019 within 7 day(s). 1.  Patient will perform upper body dressing with supervision/set-up 2. Patient will perform lower body dressing with supervision/set-up. 3.  Patient will perform toileting with minimal assistance/contact guard assist. 
4.  Patient will perform toilet transfers with minimal assistance/contact guard assist. 
5.  Patient will perform a functional activity of choice while sitting at the edge of the bed with supervision, demonstrating Fair sitting balance. 6.  Patient will participate in upper extremity therapeutic exercise/activities with supervision/set-up for 5 minutes. Prior Level of Function: Per pt's son, the pt lives with her grandson in a [facility] with 1 step to the [facility]. She was (I) with basic self-care/ADLs but received assistance at times. The pt has a nursing caregiver who is at the home 9am-9pm. They encourage the pt to perform most ADLs to the best of her ability. Outcome: Progressing Towards Goal 
 OCCUPATIONAL THERAPY EVALUATION Patient: Anthony Feliz (2 y.o. female) Date: 6/7/2019 Primary Diagnosis: Acute CHF (congestive heart failure) ([facility] [address] [I50.9] CHF exacerbation ([facility] [address] [I50.9] Precautions: Falls; Aspiration ASSESSMENT : 
Pt cleared to participate in OT evaluation by RN. Upon entering room, pt supine with HOB elevated, alert, and agreeable to therapy session with son present. Based on the objective data described below, the patient presents with decreased strength, decreased endurance, decreased functional balance, and decreased functional mobility, affecting her performance in basic self-care/ADL tasks. Initial BP with pt supine at rest: 134/44. Pt requires max assist for bed mobility to participate in further self-care. While sitting EOB, pt's BP elevated to 160/108.  Pt presented with poor sitting balance with therapist and son providing support. However pt was able to sit EOB ~3 minutes. Pt requested to return to bed, requiring max assist sit-supine. Pt presents she is able to follow commands with demo from therapist. Pt responds better to voice of son. She is able to perform LB dressing at bed level with min assist, demonstrating she was able to doff sock but had difficulty donning. She is able to perform grooming with supervision/setup. Educated pt and son on the role of OT, evaluation process, and goals for therapy. The son demonstrated good understanding, however pt demonstrated no evidence of learning. The pt will benefit from further OT services, in order to maximize her ADL performance and decrease the risk for complications associated with decreased functional activity. At the end of the session, pt left resting comfortably in bed, call-bell in reach, with all needs met. Bp: 
Supine at rest (at 9a): 136/44 Sitting EOB: 160/108 After pt was returned to supine at rest: 154/60 Patient will benefit from skilled intervention to address the above impairments. Patient's rehabilitation potential is considered to be Good Factors which may influence rehabilitation potential include: ? None noted ? Mental ability/status ? Medical condition ? Home/family situation and support systems ? Safety awareness ? Pain tolerance/management ? Other: PLAN : 
Recommendations and Planned Interventions:  
?               Self Care Training                  ? Therapeutic Activities ? Functional Mobility Training   ? Cognitive Retraining 
? Therapeutic Exercises           ? Endurance Activities ? Balance Training                    ? Neuromuscular Re-Education ? Visual/Perceptual Training     ? Home Safety Training ?               Patient Education                   ? Family Training/Education ? Other (comment): Frequency/Duration: Patient will be followed by occupational therapy 1-2 times per day/4-7 days per week to address goals. Discharge Recommendations: Home Health with 24/7 Supervision Further Equipment Recommendations for Discharge: Pt has all the recommended equipment to safely return home. SUBJECTIVE:  
Patient stated ? I love you? OBJECTIVE DATA SUMMARY:  
 
Past Medical History:  
Diagnosis Date Anemia NEC Anxiety Cataract   
 bilat Colon cancer ([facility] [address] 1999 CRI (chronic renal insufficiency) 4/07 CVA (cerebral infarction) ([facility] [address] 4/01  
 right facial droop CVA (cerebral infarction) ([facility] [address] 2/11  
 left post corona radiata Dementia Diverticulosis Edema GERD (gastroesophageal reflux disease) Hypercholesterolemia Hypertension Hypomagnesemia 2/11 Lipoma of neck   
 right Orthostatic hypotension RBBB (right bundle branch block) Syncope 6/08 TIA (transient ischemic attack) mid 1990's Past Surgical History:  
Procedure Laterality Date ENDOSCOPY, COLON, DIAGNOSTIC  2006 HX BREAST BIOPSY HX CATARACT REMOVAL  10/02  
 left HX CATARACT REMOVAL  6/03  
 right HX COLECTOMY  1999  
 right hemicolectomy HX HYSTERECTOMY HX LIPOMA RESECTION Barriers to Learning/Limitations: yes;  cognitive, sensory deficits-vision/hearing/speech and altered mental status (i.e.Sedation, Confusion) Compensate with: visual, verbal, tactile, kinesthetic cues/model Home Situation:  
Home Situation Home Environment: Private residence # Steps to Enter: 5 One/Two Story Residence: One story Living Alone: No 
Support Systems: Family member(s) Patient Expects to be Discharged to[de-identified] Private residence Current DME Used/Available at Home: Commode, bedside, Walker, Raised toilet seat Tub or Shower Type: (Pt sponge bathes) ?  Right hand dominant   ? Left hand dominant Cognitive/Behavioral Status: 
Neurologic State: Alert Orientation Level: Oriented to self Cognition: Follows commands Safety/Judgement: Fall prevention Skin: Visible skin appeared intact Edema: None noted Coordination: BUE Coordination: Generally decreased, functional 
Fine Motor Skills-Upper: Left Intact; Right Intact Gross Motor Skills-Upper: Left Intact; Right Intact Balance: 
Sitting: Impaired; With support Sitting - Static: Poor (constant support) Strength: BUE Strength: Generally decreased, functional 
 
 
Tone & Sensation: BUE Tone: Normal 
Sensation: Intact Range of Motion: BUE 
AROM: Generally decreased, functional 
 
 
Functional Mobility and Transfers for ADLs: 
Bed Mobility: 
Supine to Sit: Maximum assistance Sit to Supine: Maximum assistance Scooting: Maximum assistance;Assist x2(to HOB; MaxAx1 to EOB) Transfers: 
Sit to Stand: (Not assessed today as pt demonstrated poor sitting balance) While sitting EOB, pt's BP elevated to 160/108 ADL Assessment:  
Feeding: Setup;Supervision Oral Facial Hygiene/Grooming: Setup;Supervision Bathing: Minimum assistance Upper Body Dressing: Minimum assistance Lower Body Dressing: Minimum assistance Toileting: Minimum assistance ADL Intervention: 
Grooming Grooming Assistance: Set-up; Supervision Washing Face: Set-up; Supervision(with wash cloth) Lower Body Dressing Assistance Dressing Assistance: Minimum assistance Socks: Minimum assistance Position Performed: Supine Cognitive Retraining Safety/Judgement: Fall prevention Pain: 
Pain level pre-treatment: 0/10 Pain level post-treatment: 0/10 Pt denies pain during the time of evaluation. Activity Tolerance:  
Fair- 
 
Please refer to the flowsheet for vital signs taken during this treatment. After treatment:  
? Patient left in no apparent distress sitting up in chair ? Patient left in no apparent distress in bed 
? Call bell left within reach ? Nursing notified ? Son present ? Bed alarm activated COMMUNICATION/EDUCATION:  
? Role of Occupational Therapy in the acute care setting 
? Home safety education was provided and the patient/caregiver indicated understanding. ? Patient/family have participated as able in goal setting and plan of care. ? Patient/family agree to work toward stated goals and plan of care. ? Patient understands intent and goals of therapy, but is neutral about his/her participation. ? Patient is unable to participate in goal setting and plan of care. Thank you for this referral. 
Evon Shepherd, OT Time Calculation: 39 mins Eval Complexity: History: MEDIUM Complexity : Expanded review of history including physical, cognitive and psychosocial  history ; Examination: MEDIUM Complexity : 3-5 performance deficits relating to physical, cognitive , or psychosocial skils that result in activity limitations and / or participation restrictions; Decision Making:MEDIUM Complexity : Patient may present with comorbidities that affect occupational performnce. Miniml to moderate modification of tasks or assistance (eg, physical or verbal ) with assesment(s) is necessary to enable patient to complete evaluation

## 2019-06-07 NOTE — PROGRESS NOTES
Problem: Mobility Impaired (Adult and Pediatric) Goal: *Acute Goals and Plan of Care (Insert Text) Description Physical Therapy Goals Initiated 6/7/2019 and to be accomplished within 7 day(s) 1. Patient will move from supine to sit and sit to supine  and scoot up and down in bed with minimal assistance/contact guard assist.    
2.  Patient will transfer from bed to chair and chair to bed with minimal assistance/contact guard assist using the least restrictive device. 3.  Patient will perform sit to stand with minimal assistance/contact guard assist. 
4.  Patient will ambulate with minimal assistance/contact guard assist for 75 feet with the least restrictive device. 5.  Patient will ascend/descend 4 stairs with 1 handrail(s) with minimal assistance/contact guard assist.  
 
Prior Level of Function: Pt lives in 1 story home with 4 JOAQUIN and family members available to assist prn. Per pt's son, nursing aid comes q/d but pt was otherwise independent with most mobility. Outcome: Progressing Towards Goal 
  
PHYSICAL THERAPY EVALUATION Patient: Graciela Perla (87 y.o. female) Date: 6/7/2019 Primary Diagnosis: Acute CHF (congestive heart failure) ([facility] [address] [I50.9] CHF exacerbation ([facility] [address] [I50.9] Precautions:   Fall Weight Bearing: FWB 
 
ASSESSMENT : 
PT orders received and patient cleared by nursing to participate with therapy. Patient is a 80 y.o. female admitted to the hospital due to acute CHF. Patient consents to PT evaluation and treatment. Pt was supine with family present at start of session. Pt was hard at hearing and was not very verbal but was able to follow commands. Pt performed supine to sit with elevated HOB mod assist, increased time. Pt sitting balance was impaired at first and pt required support to remain upright. With additional verbal cuing pt was able to sit Eob without support. Pt able to scoot to Eob min assist/contact guard.  Pt required constant verbal and manual cues throughout session to assist with balance and spatial awareness. Pt sitting posture increased trunk and neck lean to the R. BP in sitting 180/78. Pt performed sit to stand mod assist with Rw and amb 6 ft mod assist with RW for stability. Pt required mod assist while standing for balance during step phase. Pt ended session in recliner with all needs met. Pt and family educated that pt would benefit from SNF due to current decrease in mobility. Per pt family,  with 24 hr supervision is preferred. Family members reported pt was previously very independent, however, were educated that at this time pt is not safe with independence. Family educated on the importance of assisting pt in all mobility prn. Patient will benefit from skilled intervention to address the above impairments. Patient's rehabilitation potential is considered to be Fair Factors which may influence rehabilitation potential include:  
? None noted ? Mental ability/status ? Medical condition ? Home/family situation and support systems ? Safety awareness 
? Pain tolerance/management 
? Other: PLAN : 
Recommendations and Planned Interventions:  
?           Bed Mobility Training             ? Neuromuscular Re-Education ? Transfer Training                   ? Orthotic/Prosthetic Training 
? Gait Training                          ? Modalities ? Therapeutic Exercises           ? Edema Management/Control ? Therapeutic Activities            ? Family Training/Education ? Patient Education ? Other (comment): Frequency/Duration: Patient will be followed by physical therapy 1-2 times per day/4-7 days per week to address goals. Discharge Recommendations: Home health with 24 hr supervision vs Snf.    
Further Equipment Recommendations for Discharge: N/A  
 
SUBJECTIVE:  
 Patient stated I'm old.  OBJECTIVE DATA SUMMARY:  
 
Past Medical History:  
Diagnosis Date  Anemia NEC  Anxiety  Cataract   
 bilat  Colon cancer ([facility] [address] 1999  CRI (chronic renal insufficiency) 4/07  CVA (cerebral infarction) ([facility] [address] 4/01  
 right facial droop  CVA (cerebral infarction) ([facility] [address] 2/11  
 left post corona radiata  Dementia  Diverticulosis  Edema  GERD (gastroesophageal reflux disease)  Hypercholesterolemia  Hypertension  Hypomagnesemia 2/11  Lipoma of neck   
 right  Orthostatic hypotension  RBBB (right bundle branch block)  Syncope 6/08  TIA (transient ischemic attack) mid 1990's Past Surgical History:  
Procedure Laterality Date  ENDOSCOPY, COLON, DIAGNOSTIC  2006  HX BREAST BIOPSY  HX CATARACT REMOVAL  10/02  
 left  HX CATARACT REMOVAL  6/03  
 right  HX COLECTOMY  1999  
 right hemicolectomy  HX HYSTERECTOMY  HX LIPOMA RESECTION Barriers to Learning/Limitations: yes;  sensory deficits-vision/hearing/speech Compensate with: Visual Cues, Verbal Cues and Tactile Cues Home Situation: 
Home Situation Home Environment: Private residence # Steps to Enter: 4 Rails to Enter: Yes Hand Rails : Right One/Two Story Residence: One story Living Alone: (family checks in frequently, nursing aid q/d) Support Systems: Child(cat), Family member(s) Patient Expects to be Discharged to[de-identified] Private residence Current DME Used/Available at Home: Commode, bedside, Walker, Raised toilet seat Tub or Shower Type: (Pt sponge bathes) Critical Behavior: 
Neurologic State: Alert Orientation Level: Oriented X4 Cognition: Follows commands Safety/Judgement: Fall prevention Psychosocial 
Patient Behaviors: Calm; Cooperative Family  Behaviors: Calm; Cooperative Purposeful Interaction: Yes Pt Identified Daily Priority: Clinical issues (comment); Communication issues (comment) [employer] Process: Nurture loving kindness;Enable alon/hope;Establish trust;Create healing environment; Attend basic human needs; Open life/death unknowns; Supportive expression Caring Interventions: Reassure; Therapeutic modalities; Other caring modalities Reassure: Caring rounds;Quiet presence Therapeutic Modalities: Music;Music/Imagery channel ([facility] only) Family  Behaviors: Calm; Cooperative B LE Strength:   
Strength: Generally decreased, functional             
B LE Tone & Sensation:  
Tone: Normal         
Sensation: Intact B LE Range Of Motion: 
AROM: Generally decreased, functional       
        
Functional Mobility: 
Bed Mobility: 
  
Supine to Sit: Moderate assistance Sit to Supine: Maximum assistance Scooting: Contact guard assistance Transfers: 
Sit to Stand: Minimum assistance; Moderate assistance Balance:  
Sitting: Impaired; With support Sitting - Static: Fair (occasional); Poor (constant support)(with VC, able to maintain w/o support for few minutes) Standing: Impaired; With support Standing - Static: Occasional;Poor Standing - Dynamic : Occasional;Poor(LOB when taking steps, mod asssit for stability) Ambulation/Gait Training: 
Distance (ft): 6 Feet (ft) Assistive Device: Walker, rolling Ambulation - Level of Assistance: Moderate assistance Gait Abnormalities: Decreased step clearance(trunk flexed, head flexed) Right Side Weight Bearing: Full Left Side Weight Bearing: Full Base of Support: Widened Speed/Saige: Slow Step Length: Left shortened;Right shortened Therapeutic Exercises:  
Pt educated on ankle pumps for blood circulation. Pain: 
Pt denies any pain throughout session. Activity Tolerance:  
Fair Please refer to the flowsheet for vital signs taken during this treatment. After treatment:  
?         Patient left in no apparent distress sitting up in chair ? Patient left in no apparent distress in bed ?         Call bell left within reach ? Personal items in reach ? Nursing notified [facility] ? Caregiver present ? Bed/chair alarm activated ? SCDs applied COMMUNICATION/EDUCATION:  
?         Role of Physical Therapy in the acute care setting. ?         Fall prevention education was provided and the patient/caregiver indicated understanding. ? Patient/family have participated as able in goal setting and plan of care. ?         Patient/family agree to work toward stated goals and plan of care. ?         Patient understands intent and goals of therapy, but is neutral about his/her participation. ? Patient is unable to participate in goal setting/plan of care: ongoing with therapy staff. ?         Out of bed with nursing assistance 3-5 times a day. ? Other: Thank you for this referral. 
David Mc DPT Time Calculation 38 minutes Eval Complexity: History: MEDIUM  Complexity : 1-2 comorbidities / personal factors will impact the outcome/ POC Exam:HIGH Complexity : 4+ Standardized tests and measures addressing body structure, function, activity limitation and / or participation in recreation  Presentation: MEDIUM Complexity : Evolving with changing characteristics  Clinical Decision Making:Medium Complexity    Overall Complexity:MEDIUM A student participated in this treatment session. Per CMS Medicare statements and guidelines I certify that the following was true: 1. I was present and directly observed the entire session. 2. I made all skilled judgments and clinical decisions regarding care. 3. I am the practitioner responsible for assessment, treatment, and documentation. Thank you, Kayleen Severe, PT, DPT

## 2019-06-08 LAB
ABO + RH BLD: NORMAL
ANION GAP SERPL CALC-SCNC: 6 MMOL/L (ref 3–18)
BASOPHILS # BLD: 0 K/UL (ref 0–0.1)
BASOPHILS NFR BLD: 1 % (ref 0–2)
BLOOD GROUP ANTIBODIES SERPL: NORMAL
BUN SERPL-MCNC: 48 MG/DL (ref 7–18)
BUN/CREAT SERPL: 25 (ref 12–20)
CALCIUM SERPL-MCNC: 9 MG/DL (ref 8.5–10.1)
CHLORIDE SERPL-SCNC: 105 MMOL/L (ref 100–108)
CO2 SERPL-SCNC: 32 MMOL/L (ref 21–32)
CREAT SERPL-MCNC: 1.94 MG/DL (ref 0.6–1.3)
DIFFERENTIAL METHOD BLD: ABNORMAL
EOSINOPHIL # BLD: 0.1 K/UL (ref 0–0.4)
EOSINOPHIL NFR BLD: 2 % (ref 0–5)
ERYTHROCYTE [DISTWIDTH] IN BLOOD BY AUTOMATED COUNT: 16.3 % (ref 11.6–14.5)
GLUCOSE SERPL-MCNC: 86 MG/DL (ref 74–99)
HCT VFR BLD AUTO: 23.7 % (ref 35–45)
HGB BLD-MCNC: 7.6 G/DL (ref 12–16)
LYMPHOCYTES # BLD: 1.8 K/UL (ref 0.9–3.6)
LYMPHOCYTES NFR BLD: 28 % (ref 21–52)
MCH RBC QN AUTO: 27.2 PG (ref 24–34)
MCHC RBC AUTO-ENTMCNC: 32.1 G/DL (ref 31–37)
MCV RBC AUTO: 84.9 FL (ref 74–97)
MONOCYTES # BLD: 0.7 K/UL (ref 0.05–1.2)
MONOCYTES NFR BLD: 12 % (ref 3–10)
NEUTS SEG # BLD: 3.7 K/UL (ref 1.8–8)
NEUTS SEG NFR BLD: 57 % (ref 40–73)
PLATELET # BLD AUTO: 162 K/UL (ref 135–420)
PMV BLD AUTO: 11.2 FL (ref 9.2–11.8)
POTASSIUM SERPL-SCNC: 3.8 MMOL/L (ref 3.5–5.5)
RBC # BLD AUTO: 2.79 M/UL (ref 4.2–5.3)
SODIUM SERPL-SCNC: 143 MMOL/L (ref 136–145)
SPECIMEN EXP DATE BLD: NORMAL
WBC # BLD AUTO: 6.4 K/UL (ref 4.6–13.2)

## 2019-06-08 PROCEDURE — 77030037875 HC DRSG MEPILEX <16IN BORD MOLN -A

## 2019-06-08 PROCEDURE — 87186 SC STD MICRODIL/AGAR DIL: CPT

## 2019-06-08 PROCEDURE — 77030027138 HC INCENT SPIROMETER -A

## 2019-06-08 PROCEDURE — 85025 COMPLETE CBC W/AUTO DIFF WBC: CPT

## 2019-06-08 PROCEDURE — 74011000250 HC RX REV CODE- 250: Performed by: INTERNAL MEDICINE

## 2019-06-08 PROCEDURE — 74011250636 HC RX REV CODE- 250/636: Performed by: HOSPITALIST

## 2019-06-08 PROCEDURE — 36415 COLL VENOUS BLD VENIPUNCTURE: CPT

## 2019-06-08 PROCEDURE — 74011250637 HC RX REV CODE- 250/637: Performed by: HOSPITALIST

## 2019-06-08 PROCEDURE — 87086 URINE CULTURE/COLONY COUNT: CPT

## 2019-06-08 PROCEDURE — 86900 BLOOD TYPING SEROLOGIC ABO: CPT

## 2019-06-08 PROCEDURE — 65270000029 HC RM PRIVATE

## 2019-06-08 PROCEDURE — 77010033678 HC OXYGEN DAILY

## 2019-06-08 PROCEDURE — 74011250636 HC RX REV CODE- 250/636: Performed by: INTERNAL MEDICINE

## 2019-06-08 PROCEDURE — 87077 CULTURE AEROBIC IDENTIFY: CPT

## 2019-06-08 PROCEDURE — 77030038269 HC DRN EXT URIN PURWCK BARD -A

## 2019-06-08 PROCEDURE — 80048 BASIC METABOLIC PNL TOTAL CA: CPT

## 2019-06-08 RX ORDER — FUROSEMIDE 10 MG/ML
20 INJECTION INTRAMUSCULAR; INTRAVENOUS ONCE
Status: COMPLETED | OUTPATIENT
Start: 2019-06-08 | End: 2019-06-08

## 2019-06-08 RX ADMIN — FUROSEMIDE 20 MG: 10 INJECTION, SOLUTION INTRAVENOUS at 16:54

## 2019-06-08 RX ADMIN — FUROSEMIDE 40 MG: 40 TABLET ORAL at 09:23

## 2019-06-08 RX ADMIN — FERROUS SULFATE TAB 325 MG (65 MG ELEMENTAL FE) 325 MG: 325 (65 FE) TAB at 09:23

## 2019-06-08 RX ADMIN — Medication 10 ML: at 05:44

## 2019-06-08 RX ADMIN — CEFTRIAXONE SODIUM 1 G: 1 INJECTION, POWDER, FOR SOLUTION INTRAMUSCULAR; INTRAVENOUS at 00:38

## 2019-06-08 RX ADMIN — CARVEDILOL 12.5 MG: 12.5 TABLET, FILM COATED ORAL at 17:29

## 2019-06-08 RX ADMIN — CLONIDINE HYDROCHLORIDE 0.1 MG: 0.1 TABLET ORAL at 17:29

## 2019-06-08 RX ADMIN — AZITHROMYCIN MONOHYDRATE 500 MG: 500 INJECTION, POWDER, LYOPHILIZED, FOR SOLUTION INTRAVENOUS at 01:06

## 2019-06-08 RX ADMIN — Medication 10 ML: at 21:40

## 2019-06-08 RX ADMIN — ASPIRIN 81 MG: 81 TABLET, COATED ORAL at 09:23

## 2019-06-08 RX ADMIN — CARVEDILOL 12.5 MG: 12.5 TABLET, FILM COATED ORAL at 09:23

## 2019-06-08 RX ADMIN — HEPARIN SODIUM 5000 UNITS: 5000 INJECTION INTRAVENOUS; SUBCUTANEOUS at 09:23

## 2019-06-08 RX ADMIN — ACETAMINOPHEN 650 MG: 325 TABLET ORAL at 12:13

## 2019-06-08 RX ADMIN — HEPARIN SODIUM 5000 UNITS: 5000 INJECTION INTRAVENOUS; SUBCUTANEOUS at 16:54

## 2019-06-08 RX ADMIN — CLOPIDOGREL BISULFATE 75 MG: 75 TABLET, FILM COATED ORAL at 09:22

## 2019-06-08 RX ADMIN — ACETAMINOPHEN 650 MG: 325 TABLET ORAL at 00:38

## 2019-06-08 RX ADMIN — FELODIPINE 10 MG: 5 TABLET, EXTENDED RELEASE ORAL at 09:23

## 2019-06-08 RX ADMIN — HEPARIN SODIUM 5000 UNITS: 5000 INJECTION INTRAVENOUS; SUBCUTANEOUS at 23:00

## 2019-06-08 RX ADMIN — CLONIDINE HYDROCHLORIDE 0.1 MG: 0.1 TABLET ORAL at 09:23

## 2019-06-08 RX ADMIN — SIMVASTATIN 20 MG: 20 TABLET, FILM COATED ORAL at 21:40

## 2019-06-08 RX ADMIN — Medication 10 ML: at 14:56

## 2019-06-08 NOTE — PROGRESS NOTES
Pt is having a fever A drop in H/H since admission Blood Cx, UA and CXR were done Will send for fecal occult stools and type and screen  
+ve UA Possible infiltrate on CX Will cover with Ceftriaxone and Zithromax Vitally stable and responsive but fragile and has poor air entry and respiratory efforts Admitted for CHF exacerbation

## 2019-06-08 NOTE — PROGRESS NOTES
[facility] Hospitalist Group Progress Note Patient: Devin Rodriguez Age: 80 y.o. : 1922 MR#: [id number] SSN: [id number] Date/Time: 2019 Subjective: pt feels ok, slightly slow today, did not sleep well last night, denies any complaints, poor historian Son and family at bedside. Had fever last night, mild cough per family Assessment/Plan: 1. Acute diastolic HF, Echo noted, EF 55%, cardiology in put noted, cont PO lasix, will add dose of IV lasix today. 4 L neg balance 2. Fever: ? UTI vs CAP: will follow up Cx, cont ceftriaxone and Zithromax. 3. Elevated trop: ? Demand ischemia due to # 1, doubt PE, neg LE duplex for DVT. No need for any further work up. 4. Acute hypoxic Resp failure - likely from #1: better, cont O2 and wean as tolerated 5. Anemia, H/H stable: no signs of bleeding, stool occult pending and monitor 6. HTN: BP stable, will cont current med's and monitor 7. H/o CAD with mild elevated trop: plan per cardiology, cont asa, BB and statin 8. Chronic anemia - monitor H&H 9. CKD 3: will cont to monitor creatinine since pt is being diuresed 10. H/o CVA: supportive care 11. Generalized debility: cont PT/OT 12. Moderate malnutrition - Nutrition consult 13. Alz dementia: supportive care 14. DNR  
DVT Px - Heparin D/w son Julita Ban  - POA in detail at bedside, updated all test results and plan care. Dispo: home with A[facility]udy[facility]ron[facility]  at discharge per son Case discussed with:  [x]Patient  [x]Family  [x]Nursing  []Case Management DVT Prophylaxis:  []Lovenox  [x]Hep SQ  []SCDs  []Coumadin   []On Heparin gtt Objective:  
VS:  
Visit Vitals /69 (BP 1 Location: Left arm, BP Patient Position: At rest) Pulse 66 Temp 98 °F (36.7 °C) Resp 18 Ht 4' 11\" (1.499 m) Wt 55.3 kg (122 lb) SpO2 91% Breastfeeding? No  
BMI 24.64 kg/m² Tmax/24hrs: Temp (24hrs), Av °F (37.2 °C), Min:97.8 °F (36.6 °C), Max:100.6 °F (38.1 °C) IOBRIEF Intake/Output Summary (Last 24 hours) at 6/8/2019 1239 Last data filed at 6/8/2019 1230 Gross per 24 hour Intake 320 ml Output 1800 ml Net -1480 ml General:  Alert, cooperative, no acute distress   
Pulmonary: Bilaterally min basal rales. No Wheezing. Cardiovascular: Regular rate and Rhythm. GI:  Soft, Non distended, Non tender. + Bowel sounds. Extremities:  no edema. No calf tenderness. Neurologic: Alert and oriented X 1-2. Moves all ext. Additional: 
 
Medications:  
Current Facility-Administered Medications Medication Dose Route Frequency  cefTRIAXone (ROCEPHIN) 1 g in sterile water (preservative free) 10 mL IV syringe  1 g IntraVENous Q24H  
 azithromycin (ZITHROMAX) 500 mg in 0.9% sodium chloride 250 mL IVPB  500 mg IntraVENous Q24H  
 furosemide (LASIX) injection 20 mg  20 mg IntraVENous ONCE  
 furosemide (LASIX) tablet 40 mg  40 mg Oral DAILY  aspirin delayed-release tablet 81 mg  81 mg Oral DAILY  felodipine (PLENDIL SR) 24 hr tablet 10 mg  10 mg Oral DAILY  clopidogrel (PLAVIX) tablet 75 mg  75 mg Oral DAILY  cloNIDine HCl (CATAPRES) tablet 0.1 mg  0.1 mg Oral BID  simvastatin (ZOCOR) tablet 20 mg  20 mg Oral QHS  carvedilol (COREG) tablet 12.5 mg  12.5 mg Oral BID WITH MEALS  ferrous sulfate tablet 325 mg  325 mg Oral ACB  acetaminophen (TYLENOL) tablet 650 mg  650 mg Oral Q6H PRN  
 ondansetron (ZOFRAN) injection 4 mg  4 mg IntraVENous Q6H PRN  
 sodium chloride (NS) flush 5-40 mL  5-40 mL IntraVENous Q8H  
 sodium chloride (NS) flush 5-40 mL  5-40 mL IntraVENous PRN  
 heparin (porcine) injection 5,000 Units  5,000 Units SubCUTAneous Q8H Labs:   
Recent Results (from the past 24 hour(s)) URINALYSIS W/ RFLX MICROSCOPIC Collection Time: 06/07/19  8:00 PM  
Result Value Ref Range Color YELLOW Appearance CLEAR Specific gravity 1.008 1.005 - 1.030    
 pH (UA) 6.0 5.0 - 8.0 Protein NEGATIVE  NEG mg/dL Glucose NEGATIVE  NEG mg/dL Ketone NEGATIVE  NEG mg/dL Bilirubin NEGATIVE  NEG Blood TRACE (A) NEG Urobilinogen 0.2 0.2 - 1.0 EU/dL Nitrites POSITIVE (A) NEG Leukocyte Esterase MODERATE (A) NEG URINE MICROSCOPIC ONLY Collection Time: 06/07/19  8:00 PM  
Result Value Ref Range WBC 4 to 11 0 - 4 /hpf  
 RBC 0 to 3 0 - 5 /hpf Epithelial cells NEGATIVE  0 - 5 /lpf Bacteria 4+ (A) NEG /hpf CULTURE, BLOOD Collection Time: 06/07/19  9:25 PM  
Result Value Ref Range Special Requests: NO SPECIAL REQUESTS Culture result: NO GROWTH AFTER 9 HOURS    
CULTURE, BLOOD Collection Time: 06/07/19  9:38 PM  
Result Value Ref Range Special Requests: NO SPECIAL REQUESTS Culture result: NO GROWTH AFTER 9 HOURS    
TYPE & SCREEN Collection Time: 06/08/19  5:13 AM  
Result Value Ref Range Crossmatch Expiration 06/11/2019 ABO/Rh(D) O POSITIVE Antibody screen NEG   
CBC WITH AUTOMATED DIFF Collection Time: 06/08/19  5:13 AM  
Result Value Ref Range WBC 6.4 4.6 - 13.2 K/uL  
 RBC 2.79 (L) 4.20 - 5.30 M/uL HGB 7.6 (L) 12.0 - 16.0 g/dL HCT 23.7 (L) 35.0 - 45.0 % MCV 84.9 74.0 - 97.0 FL  
 MCH 27.2 24.0 - 34.0 PG  
 MCHC 32.1 31.0 - 37.0 g/dL  
 RDW 16.3 (H) 11.6 - 14.5 % PLATELET 728 300 - 480 K/uL MPV 11.2 9.2 - 11.8 FL  
 NEUTROPHILS 57 40 - 73 % LYMPHOCYTES 28 21 - 52 % MONOCYTES 12 (H) 3 - 10 % EOSINOPHILS 2 0 - 5 % BASOPHILS 1 0 - 2 %  
 ABS. NEUTROPHILS 3.7 1.8 - 8.0 K/UL  
 ABS. LYMPHOCYTES 1.8 0.9 - 3.6 K/UL  
 ABS. MONOCYTES 0.7 0.05 - 1.2 K/UL  
 ABS. EOSINOPHILS 0.1 0.0 - 0.4 K/UL  
 ABS. BASOPHILS 0.0 0.0 - 0.1 K/UL  
 DF AUTOMATED METABOLIC PANEL, BASIC Collection Time: 06/08/19  9:00 AM  
Result Value Ref Range Sodium 143 136 - 145 mmol/L Potassium 3.8 3.5 - 5.5 mmol/L Chloride 105 100 - 108 mmol/L  
 CO2 32 21 - 32 mmol/L  Anion gap 6 3.0 - 18 mmol/L  
 Glucose 86 74 - 99 mg/dL BUN 48 (H) 7.0 - 18 MG/DL Creatinine 1.94 (H) 0.6 - 1.3 MG/DL  
 BUN/Creatinine ratio 25 (H) 12 - 20 GFR est AA 29 (L) >60 ml/min/1.73m2 GFR est non-AA 24 (L) >60 ml/min/1.73m2 Calcium 9.0 8.5 - 10.1 MG/DL Signed By: Lidia Hernandez MD   
 June 8, 2019

## 2019-06-08 NOTE — PROGRESS NOTES
Problem: Falls - Risk of 
Goal: *Absence of Falls Description Document Easter Getting Fall Risk and appropriate interventions in the flowsheet. Outcome: Progressing Towards Goal 
  
Problem: Patient Education: Go to Patient Education Activity Goal: Patient/Family Education Outcome: Progressing Towards Goal 
  
Problem: Pressure Injury - Risk of 
Goal: *Prevention of pressure injury Description Document Shay Scale and appropriate interventions in the flowsheet. Outcome: Progressing Towards Goal 
  
Problem: Patient Education: Go to Patient Education Activity Goal: Patient/Family Education Outcome: Progressing Towards Goal 
  
Problem: Patient Education: Go to Patient Education Activity Goal: Patient/Family Education Outcome: Progressing Towards Goal 
  
Problem: Patient Education: Go to Patient Education Activity Goal: Patient/Family Education Outcome: Progressing Towards Goal 
  
Problem: Patient Education: Go to Patient Education Activity Goal: Patient/Family Education Outcome: Progressing Towards Goal

## 2019-06-08 NOTE — PROGRESS NOTES
Spoke to Dr. Wayne Cox regarding pt's elevated tempeture. New orders to obtain blood cultures times 2, UA, and portable CXR.

## 2019-06-08 NOTE — ROUTINE PROCESS
Bedside shift change report given to Nick Alba RN (oncoming nurse) by Lisa Khan RN (offgoing nurse). Report included the following information SBAR, Kardex and MAR.

## 2019-06-09 PROCEDURE — 77010033678 HC OXYGEN DAILY

## 2019-06-09 PROCEDURE — 74011000250 HC RX REV CODE- 250: Performed by: INTERNAL MEDICINE

## 2019-06-09 PROCEDURE — 74011250636 HC RX REV CODE- 250/636: Performed by: HOSPITALIST

## 2019-06-09 PROCEDURE — 74011250637 HC RX REV CODE- 250/637: Performed by: HOSPITALIST

## 2019-06-09 PROCEDURE — 65270000029 HC RM PRIVATE

## 2019-06-09 PROCEDURE — 74011250636 HC RX REV CODE- 250/636: Performed by: INTERNAL MEDICINE

## 2019-06-09 RX ADMIN — CLONIDINE HYDROCHLORIDE 0.1 MG: 0.1 TABLET ORAL at 08:36

## 2019-06-09 RX ADMIN — AZITHROMYCIN MONOHYDRATE 500 MG: 500 INJECTION, POWDER, LYOPHILIZED, FOR SOLUTION INTRAVENOUS at 00:38

## 2019-06-09 RX ADMIN — CLONIDINE HYDROCHLORIDE 0.1 MG: 0.1 TABLET ORAL at 17:38

## 2019-06-09 RX ADMIN — FUROSEMIDE 40 MG: 40 TABLET ORAL at 08:37

## 2019-06-09 RX ADMIN — Medication 10 ML: at 13:41

## 2019-06-09 RX ADMIN — CEFTRIAXONE SODIUM 1 G: 1 INJECTION, POWDER, FOR SOLUTION INTRAMUSCULAR; INTRAVENOUS at 00:38

## 2019-06-09 RX ADMIN — Medication 10 ML: at 07:47

## 2019-06-09 RX ADMIN — FELODIPINE 10 MG: 5 TABLET, EXTENDED RELEASE ORAL at 08:36

## 2019-06-09 RX ADMIN — CARVEDILOL 12.5 MG: 12.5 TABLET, FILM COATED ORAL at 17:38

## 2019-06-09 RX ADMIN — ASPIRIN 81 MG: 81 TABLET, COATED ORAL at 08:36

## 2019-06-09 RX ADMIN — HEPARIN SODIUM 5000 UNITS: 5000 INJECTION INTRAVENOUS; SUBCUTANEOUS at 06:32

## 2019-06-09 RX ADMIN — CARVEDILOL 12.5 MG: 12.5 TABLET, FILM COATED ORAL at 08:37

## 2019-06-09 RX ADMIN — CLOPIDOGREL BISULFATE 75 MG: 75 TABLET, FILM COATED ORAL at 08:37

## 2019-06-09 RX ADMIN — HEPARIN SODIUM 5000 UNITS: 5000 INJECTION INTRAVENOUS; SUBCUTANEOUS at 15:53

## 2019-06-09 RX ADMIN — FERROUS SULFATE TAB 325 MG (65 MG ELEMENTAL FE) 325 MG: 325 (65 FE) TAB at 08:37

## 2019-06-09 NOTE — PROGRESS NOTES
Problem: Falls - Risk of 
Goal: *Absence of Falls Description Document Tyson York Fall Risk and appropriate interventions in the flowsheet. Outcome: Progressing Towards Goal 
  
Problem: Patient Education: Go to Patient Education Activity Goal: Patient/Family Education Outcome: Progressing Towards Goal 
  
Problem: Pressure Injury - Risk of 
Goal: *Prevention of pressure injury Description Document Shay Scale and appropriate interventions in the flowsheet. Outcome: Progressing Towards Goal 
  
Problem: Patient Education: Go to Patient Education Activity Goal: Patient/Family Education Outcome: Progressing Towards Goal 
  
Problem: Patient Education: Go to Patient Education Activity Goal: Patient/Family Education Outcome: Progressing Towards Goal 
  
Problem: Patient Education: Go to Patient Education Activity Goal: Patient/Family Education Outcome: Progressing Towards Goal 
  
Problem: Patient Education: Go to Patient Education Activity Goal: Patient/Family Education Outcome: Progressing Towards Goal

## 2019-06-09 NOTE — PROGRESS NOTES
[facility] Hospitalist Group Progress Note Patient: Sonia Garsia Age: 80 y.o. : 1922 MR#: [id number] SSN: [id number] Date/Time: 2019 Subjective: pt feels lot better, slept well last night, denies any complaints, poor historian Son and family at bedside. Assessment/Plan: 1. Acute diastolic HF, Echo noted, EF 55%, cardiology in put noted, cont PO lasix. 4 L neg balance 2. Fever due to UTI, doubt CAP: will follow up Cx, cont ceftriaxone and Zithromax. 3. Elevated trop: ? Demand ischemia due to # 1, doubt PE, neg LE duplex for DVT. No need for any further work up. 4. Acute hypoxic Resp failure - likely from #1: better, cont O2 and wean as tolerated 5. Anemia, H/H stable: no signs of bleeding, stool occult pending and monitor 6. HTN: BP stable, will cont current med's and monitor 7. H/o CAD with mild elevated trop: plan per cardiology, cont asa, BB and statin 8. Chronic anemia - monitor H&H 9. CKD 3: will cont to monitor creatinine since pt is being diuresed 10. H/o CVA: supportive care 11. Generalized debility: cont PT/OT 12. Moderate malnutrition - Nutrition consult 13. Alz dementia: supportive care 14. DNR  
DVT Px - Heparin D/w son Jimmy Borja  - POA in detail at bedside, updated all test results and plan care. Dispo: home with [facility] tomorrow Case discussed with:  [x]Patient  [x]Family  [x]Nursing  []Case Management DVT Prophylaxis:  []Lovenox  [x]Hep SQ  []SCDs  []Coumadin   []On Heparin gtt Objective:  
VS:  
Visit Vitals /56 (BP 1 Location: Right arm, BP Patient Position: Sitting) Pulse (!) 54 Temp 97.4 °F (36.3 °C) Resp 18 Ht 4' 11\" (1.499 m) Wt 55.3 kg (122 lb) SpO2 98% Breastfeeding? No  
BMI 24.64 kg/m² Tmax/24hrs: Temp (24hrs), Av °F (36.7 °C), Min:97.4 °F (36.3 °C), Max:98.4 °F (36.9 °C) IOBRIEF Intake/Output Summary (Last 24 hours) at 2019 8731 Last data filed at 6/9/2019 1321 Gross per 24 hour Intake 1140 ml Output 1075 ml Net 65 ml General:  Alert, cooperative, no acute distress   
Pulmonary: Bilaterally clear, min rales. No Wheezing. Cardiovascular: Regular rate and Rhythm. GI:  Soft, Non distended, Non tender. + Bowel sounds. Extremities:  No edema. No calf tenderness. Neurologic: Alert and oriented X 1-2. Moves all ext. Additional: 
 
Medications:  
Current Facility-Administered Medications Medication Dose Route Frequency  cefTRIAXone (ROCEPHIN) 1 g in sterile water (preservative free) 10 mL IV syringe  1 g IntraVENous Q24H  
 azithromycin (ZITHROMAX) 500 mg in 0.9% sodium chloride 250 mL IVPB  500 mg IntraVENous Q24H  
 furosemide (LASIX) tablet 40 mg  40 mg Oral DAILY  aspirin delayed-release tablet 81 mg  81 mg Oral DAILY  felodipine (PLENDIL SR) 24 hr tablet 10 mg  10 mg Oral DAILY  clopidogrel (PLAVIX) tablet 75 mg  75 mg Oral DAILY  cloNIDine HCl (CATAPRES) tablet 0.1 mg  0.1 mg Oral BID  simvastatin (ZOCOR) tablet 20 mg  20 mg Oral QHS  carvedilol (COREG) tablet 12.5 mg  12.5 mg Oral BID WITH MEALS  ferrous sulfate tablet 325 mg  325 mg Oral ACB  acetaminophen (TYLENOL) tablet 650 mg  650 mg Oral Q6H PRN  
 ondansetron (ZOFRAN) injection 4 mg  4 mg IntraVENous Q6H PRN  
 sodium chloride (NS) flush 5-40 mL  5-40 mL IntraVENous Q8H  
 sodium chloride (NS) flush 5-40 mL  5-40 mL IntraVENous PRN  
 heparin (porcine) injection 5,000 Units  5,000 Units SubCUTAneous Q8H Labs:   
No results found for this or any previous visit (from the past 24 hour(s)). Signed By: Brittany Goldsmith MD   
 June 9, 2019

## 2019-06-10 LAB
ANION GAP SERPL CALC-SCNC: 7 MMOL/L (ref 3–18)
BUN SERPL-MCNC: 45 MG/DL (ref 7–18)
BUN/CREAT SERPL: 24 (ref 12–20)
CALCIUM SERPL-MCNC: 8.3 MG/DL (ref 8.5–10.1)
CHLORIDE SERPL-SCNC: 105 MMOL/L (ref 100–108)
CO2 SERPL-SCNC: 29 MMOL/L (ref 21–32)
CREAT SERPL-MCNC: 1.85 MG/DL (ref 0.6–1.3)
GLUCOSE SERPL-MCNC: 154 MG/DL (ref 74–99)
POTASSIUM SERPL-SCNC: 3.8 MMOL/L (ref 3.5–5.5)
SODIUM SERPL-SCNC: 141 MMOL/L (ref 136–145)

## 2019-06-10 PROCEDURE — 97530 THERAPEUTIC ACTIVITIES: CPT

## 2019-06-10 PROCEDURE — 74011250637 HC RX REV CODE- 250/637: Performed by: HOSPITALIST

## 2019-06-10 PROCEDURE — 74011250636 HC RX REV CODE- 250/636: Performed by: INTERNAL MEDICINE

## 2019-06-10 PROCEDURE — 97116 GAIT TRAINING THERAPY: CPT

## 2019-06-10 PROCEDURE — 76450000000

## 2019-06-10 PROCEDURE — 74011000250 HC RX REV CODE- 250: Performed by: INTERNAL MEDICINE

## 2019-06-10 PROCEDURE — 77010033678 HC OXYGEN DAILY

## 2019-06-10 PROCEDURE — 80048 BASIC METABOLIC PNL TOTAL CA: CPT

## 2019-06-10 PROCEDURE — 65270000029 HC RM PRIVATE

## 2019-06-10 PROCEDURE — 36415 COLL VENOUS BLD VENIPUNCTURE: CPT

## 2019-06-10 PROCEDURE — 74011250636 HC RX REV CODE- 250/636: Performed by: HOSPITALIST

## 2019-06-10 RX ORDER — FUROSEMIDE 20 MG/1
20 TABLET ORAL ONCE
Status: COMPLETED | OUTPATIENT
Start: 2019-06-10 | End: 2019-06-10

## 2019-06-10 RX ADMIN — CLONIDINE HYDROCHLORIDE 0.1 MG: 0.1 TABLET ORAL at 08:59

## 2019-06-10 RX ADMIN — CLONIDINE HYDROCHLORIDE 0.1 MG: 0.1 TABLET ORAL at 17:15

## 2019-06-10 RX ADMIN — ASPIRIN 81 MG: 81 TABLET, COATED ORAL at 08:59

## 2019-06-10 RX ADMIN — Medication 10 ML: at 21:39

## 2019-06-10 RX ADMIN — Medication 10 ML: at 14:47

## 2019-06-10 RX ADMIN — CEFTRIAXONE SODIUM 1 G: 1 INJECTION, POWDER, FOR SOLUTION INTRAMUSCULAR; INTRAVENOUS at 01:38

## 2019-06-10 RX ADMIN — HEPARIN SODIUM 5000 UNITS: 5000 INJECTION INTRAVENOUS; SUBCUTANEOUS at 23:26

## 2019-06-10 RX ADMIN — HEPARIN SODIUM 5000 UNITS: 5000 INJECTION INTRAVENOUS; SUBCUTANEOUS at 06:34

## 2019-06-10 RX ADMIN — FELODIPINE 10 MG: 5 TABLET, EXTENDED RELEASE ORAL at 08:59

## 2019-06-10 RX ADMIN — FUROSEMIDE 40 MG: 40 TABLET ORAL at 08:59

## 2019-06-10 RX ADMIN — Medication 10 ML: at 06:30

## 2019-06-10 RX ADMIN — CARVEDILOL 12.5 MG: 12.5 TABLET, FILM COATED ORAL at 08:59

## 2019-06-10 RX ADMIN — HEPARIN SODIUM 5000 UNITS: 5000 INJECTION INTRAVENOUS; SUBCUTANEOUS at 14:46

## 2019-06-10 RX ADMIN — Medication 10 ML: at 00:16

## 2019-06-10 RX ADMIN — AZITHROMYCIN MONOHYDRATE 500 MG: 500 INJECTION, POWDER, LYOPHILIZED, FOR SOLUTION INTRAVENOUS at 01:38

## 2019-06-10 RX ADMIN — CARVEDILOL 12.5 MG: 12.5 TABLET, FILM COATED ORAL at 17:15

## 2019-06-10 RX ADMIN — CLOPIDOGREL BISULFATE 75 MG: 75 TABLET, FILM COATED ORAL at 08:59

## 2019-06-10 RX ADMIN — FUROSEMIDE 20 MG: 20 TABLET ORAL at 17:15

## 2019-06-10 RX ADMIN — FERROUS SULFATE TAB 325 MG (65 MG ELEMENTAL FE) 325 MG: 325 (65 FE) TAB at 08:59

## 2019-06-10 RX ADMIN — SIMVASTATIN 20 MG: 20 TABLET, FILM COATED ORAL at 21:38

## 2019-06-10 RX ADMIN — HEPARIN SODIUM 5000 UNITS: 5000 INJECTION INTRAVENOUS; SUBCUTANEOUS at 01:38

## 2019-06-10 RX ADMIN — SIMVASTATIN 20 MG: 20 TABLET, FILM COATED ORAL at 00:09

## 2019-06-10 NOTE — PROGRESS NOTES
Problem: Self Care Deficits Care Plan (Adult) Goal: *Acute Goals and Plan of Care (Insert Text) Description Occupational Therapy Goals Initiated 6/7/2019 within 7 day(s). 1.  Patient will perform upper body dressing with supervision/set-up 2. Patient will perform lower body dressing with supervision/set-up. 3.  Patient will perform toileting with minimal assistance/contact guard assist. 
4.  Patient will perform toilet transfers with minimal assistance/contact guard assist. 
5.  Patient will perform a functional activity of choice while sitting at the edge of the bed with supervision, demonstrating Fair sitting balance. 6.  Patient will participate in upper extremity therapeutic exercise/activities with supervision/set-up for 5 minutes. Prior Level of Function: Per pt's son, the pt lives with her grandson in a [facility] with 1 step to the [facility]. She was (I) with basic self-care/ADLs but received assistance at times. The pt has a nursing caregiver who is at the home 9am-9pm. They encourage the pt to perform most ADLs to the best of her ability. Outcome: Progressing Towards Goal 
 OCCUPATIONAL THERAPY TREATMENT Patient: Patricia Antonio (36 y.o. female) Date: 6/10/2019 Diagnosis: Acute CHF (congestive heart failure) ([facility] [I50.9] [facility] [I50.9] <principal problem not specified> Precautions: Fall PLOF: Assist w/bathing ADLs Chart, occupational therapy assessment, plan of care, and goals were reviewed. ASSESSMENT: 
Pt requires encouragement for OOB activity. Increase time and assist required w/bed mobility to maneuver to EOB. Pt educated on stand pivot transfer technique w/functional transfer to [facility]. Poor dynamic standing balance requires Max Assist w/stimulated toileting ADL. Poor safety awareness requires max verbal/tactile cues w/walker mgt and hand placement w/functional transfer to chair for 2 trials. Pt [place], reinforced importance of calling for assistance. Progression toward goals: 
?          Improving appropriately and progressing toward goals ? Improving slowly and progressing toward goals ? Not making progress toward goals and plan of care will be adjusted PLAN: 
Patient continues to benefit from skilled intervention to address the above impairments. Continue treatment per established plan of care. Discharge Recommendations:  Chris Lara Further Equipment Recommendations for Discharge:  N/A, Sharon benitez reports pt has DME SUBJECTIVE:  
Patient stated ? thank you very much. ? OBJECTIVE DATA SUMMARY:  
Cognitive/Behavioral Status: 
Neurologic State: Alert Orientation Level: Oriented to person Cognition: Follows commands Safety/Judgement: Fall prevention Functional Mobility and Transfers for ADLs: 
Bed Mobility: 
Supine to Sit: Moderate assistance Transfers: 
Sit to Stand: Minimum assistance w/standard walker Toilet Transfer : Maximum assistance(EOB --> BSC xfer w/SPT) Bed to chair:Moderate Assist w/standard walker Balance: 
Sitting: Impaired; With support Sitting - Static: Poor (constant support) Sitting - Dynamic: Poor (constant support) Standing: Impaired; With support Standing - Static: Fair Standing - Dynamic : Fair(fair minus/poor plus) ADL Intervention: 
Grooming Washing Face: Stand-by assistance Washing Hands: Stand-by assistance Pain: 
Pain level pre-treatment: 0/10 Pain level post-treatment: 0/10 Activity Tolerance:   
Poor Please refer to the flowsheet for vital signs taken during this treatment. After treatment:  
?  Patient left in no apparent distress sitting up in chair ? Patient left in no apparent distress in bed 
? Call bell left within reach ? Nursing notified ? Caregiver, Sharon present ? Bed alarm activated COMMUNICATION/EDUCATION:  
? Role of Occupational Therapy in the acute care setting 
?  Home safety education was provided and the patient/caregiver indicated understanding. ? Patient/family have participated as able in working towards goals and plan of care. ? Patient/family agree to work toward stated goals and plan of care. ? Patient understands intent and goals of therapy, but is neutral about his/her participation. ? Patient is unable to participate in goal setting and plan of care. Thank you for this referral. 
ANNA Wang Time Calculation: 23 mins

## 2019-06-10 NOTE — PROGRESS NOTES
Palliative [facility] and I met Ms Lester Berry. Her son Emma Chacon and caregiver Martell Martínez are present at bedside. Palliative team support was introduced and Mr Lester Berry shared that his 80year old father (patient's spouse) just  a few weeks ago in [place] [address] where Emma Chacon lives. Ms Lester Berry wanted to come back home here to [facility] after her  . (Her grandson Philip Dunn lives with her and she currently has two caregivers.) Ms Lester Berry is a little hard of hearing and answers our questions with, \"every day is a good day. \" She appears to be very pleasant and states that she is \"old\" and \"weak. \" And she adds her desire to go home. Hospice care/support was discussed and Marquis shared, \"I know all about that\" with his Dad. Emma Chacon stated that his mom was clear about not wanting any \"mechanical\" life support. DDNR on file. No spiritual issues or concerns were shared during our visit. Ms Lester Berry and her son expressed gratitude for visit and support and team will continue to follow up.

## 2019-06-10 NOTE — CONSULTS
Palliative Medicine Consult [facility]: [phone]) SOLANGE HENDRIX [facility]: [phone] [facility]: [phone] Patient Name: Niraj Bryant YOB: 1922 Date of Initial Consult: 6/10/2019 Reason for Consult: care decisions Requesting Provider: Beronica Ariza MD  
Primary Care Physician: Romy Watts MD 
  
 SUMMARY:  
Niraj Bryant is a 80 y.o. female with a past history of colon cancer with colectomy, CHF, CAD, HTN, CKD3, dementia, anemia, anxiety, CVA who was admitted on 2019 from home with a diagnosis of acute on chronic HF and UTI. Current medical issues leading to Palliative Medicine involvement include: advanced age, CHF, dementia and goals of care. PALLIATIVE DIAGNOSES:  
1. Advanced care decisions 2. Acute on chronic heart failure 3. Hypoxic respiratory failure 4. UTI PLAN:  
1. Advanced care decisions: Palliative care team including Mae Song LCSW, Coby Zamora,  and myself met with patient at bedside. Present also was patient's son: Rose Sorto and her caregiver: Ruth Ann Diane. Patient is a ; her   from complications of dementia a month ago on 5/15/2019. He had been receiving hospice care. Prior to that date, the patient and her  had lived with [place] in [place], [place]. Patient has since moved back to this area to live with her grandson, Dc Montero, and has caregivers 12 hours/day 7 days/week. Her caregiver reports prior to becoming ill, she had been independent with all ADL's and used walker to ambulate. Patient is [place], especially left ear. Spoke with son about goals of care. DDNR and AMD are already signed and on chart. Primary MPOA is Rose Sorto with back up being [employer]. Clarified goals of care.  Patient wishes include no resuscitation in the event of cardiac or respiratory arrest. If patient health declines, no intubation for respiratory distress, no escalation of care including transfer to ICU or pressors. DDNR signed and on chart. 2. Acute on chronic heart failure: BNP on admission 25,638. CXR: Bilateral pleural effusion, septal line thickening, and cardiomegaly suggestive of developing CHF. Underlying COPD. Patient initiated on BiPap and lasix. Cardiac echo with LFEF 55%. Moderate tricuspid valve regurgitation. Moderate pulm hypertension PASP 52 mmHg. Patient has been weaned to room air. Managed per primary team. 
3. Hypoxic respiratory failure: secondary to #2 above. Has been weaned to room air and denies shortness of breath. 4. UTI: UA with (+) nitrites, moderate leukocyte esterace, WBC 4-11, 4+ bacteria. Culture (+) for two gram negative lucero bacterias. Sensitivities pending. Currently on rocephin. 5. Initial consult note routed to primary continuity provider 6. Communicated plan of care with: Palliative IDT 
 
GOALS OF CARE: 
Patient/Health Care Proxy Stated Goals: Rehabilitation TREATMENT PREFERENCES:  
Code Status: DNR Advance Care Planning: 
Advance Care Planning 6/5/2019 Patient's Healthcare Decision Maker is: Legal Next of Kin Confirm Advance Directive Yes, not on file Medical Interventions: Limited additional interventions(No intubation for any reason) Other: As far as possible, the palliative care team has discussed with patient / health care proxy about goals of care / treatment preferences for patient. HISTORY:  
 
History obtained from: chart CHIEF COMPLAINT: weakness HPI/SUBJECTIVE: The patient is:  
[x] Verbal and participatory; limited by hearing deficits [] Non-participatory due to:  
Elderly AA female in no apparent distress. Room air. Can hear better when standing on her right side. Patient denies any pain, shortness of breath, nausea. States she was able to eat breakfast and has good appetite. Her biggest complaint is of weakness. Clinical Pain Assessment (nonverbal scale for nonverbal patients): Clinical Pain Assessment Severity: 0 Activity (Movement): Lying quietly, normal position Duration: for how long has pt been experiencing pain (e.g., 2 days, 1 month, years) Frequency: how often pain is an issue (e.g., several times per day, once every few days, constant) FUNCTIONAL ASSESSMENT:  
 
Palliative Performance Scale (PPS): PPS: 50 ECOG 
ECOG Status : Ambulatory, but unable to carry out work activities PSYCHOSOCIAL/SPIRITUAL SCREENING:  
  
Any spiritual / Tenriism concerns: 
[] Yes /  [x] No 
 
Caregiver Burnout: 
[] Yes /  [x] No /  [] No Caregiver Present Anticipatory grief assessment:  
[x] Normal  / [] Maladaptive REVIEW OF SYSTEMS:  
 
Positive and pertinent negative findings in ROS are noted above in HPI. The following systems were [x] reviewed / [] unable to be reviewed as noted in HPI Other findings are noted below. Systems: constitutional, ears/nose/mouth/throat, respiratory, gastrointestinal, genitourinary, musculoskeletal, integumentary, neurologic, psychiatric, endocrine. Positive findings noted below. Modified ESAS Completed by: provider Pain: 0 Nausea: 0 Anorexia: 0 Other Problem (Comment): 3(weakness) Stool Occurrence(s): 1 PHYSICAL EXAM:  
 
Wt Readings from Last 3 Encounters:  
06/07/19 55.3 kg (122 lb) 05/01/18 67.1 kg (148 lb) 05/07/13 81.6 kg (180 lb) Blood pressure 151/71, pulse 68, temperature 98.7 °F (37.1 °C), resp. rate 16, height 4' 11\" (1.499 m), weight 55.3 kg (122 lb), SpO2 93 %, not currently breastfeeding. Pain: 
Pain Scale 1: Adult Nonverbal Pain Scale Pain Intensity 1: 0 Constitutional: slender elderly AA female in no apparent distress Eyes: pupils equal, anicteric ENMT: no nasal discharge, moist mucous membranes Cardiovascular: distal pulses intact; no edema present Respiratory: breathing unlabored, symmetric Musculoskeletal: no deformity, no tenderness to palpation Skin: warm, dry Neurologic: following commands, moving all extremities Psychiatric: full affect, no hallucinations HISTORY:  
 
Active Problems: 
  Acute CHF (congestive heart failure) ([facility] (6/5/2019) CHF exacerbation ([facility] (6/5/2019) Past Medical History:  
Diagnosis Date  Anemia NEC  Anxiety  Cataract   
 bilat  Colon cancer ([facility] 1999  CRI (chronic renal insufficiency) 4/07  CVA (cerebral infarction) ([facility] 4/01  
 right facial droop  CVA (cerebral infarction) ([facility] 2/11  
 left post corona radiata  Dementia  Diverticulosis  Edema  GERD (gastroesophageal reflux disease)  Hypercholesterolemia  Hypertension  Hypomagnesemia 2/11  Lipoma of neck   
 right  Orthostatic hypotension  RBBB (right bundle branch block)  Syncope 6/08  TIA (transient ischemic attack) mid 1990's Past Surgical History:  
Procedure Laterality Date  ENDOSCOPY, COLON, DIAGNOSTIC  2006  HX BREAST BIOPSY  HX CATARACT REMOVAL  10/02  
 left  HX CATARACT REMOVAL  6/03  
 right  HX COLECTOMY  1999  
 right hemicolectomy  HX HYSTERECTOMY  HX LIPOMA RESECTION No family history on file. History reviewed, no pertinent family history. Social History Tobacco Use  Smoking status: Never Smoker Substance Use Topics  Alcohol use: No  
 
No Known Allergies Current Facility-Administered Medications Medication Dose Route Frequency  furosemide (LASIX) tablet 20 mg  20 mg Oral ONCE  
 cefTRIAXone (ROCEPHIN) 1 g in sterile water (preservative free) 10 mL IV syringe  1 g IntraVENous Q24H  
 azithromycin (ZITHROMAX) 500 mg in 0.9% sodium chloride 250 mL IVPB  500 mg IntraVENous Q24H  
 furosemide (LASIX) tablet 40 mg  40 mg Oral DAILY  aspirin delayed-release tablet 81 mg  81 mg Oral DAILY  felodipine (PLENDIL SR) 24 hr tablet 10 mg  10 mg Oral DAILY  clopidogrel (PLAVIX) tablet 75 mg  75 mg Oral DAILY  cloNIDine HCl (CATAPRES) tablet 0.1 mg  0.1 mg Oral BID  simvastatin (ZOCOR) tablet 20 mg  20 mg Oral QHS  carvedilol (COREG) tablet 12.5 mg  12.5 mg Oral BID WITH MEALS  ferrous sulfate tablet 325 mg  325 mg Oral ACB  acetaminophen (TYLENOL) tablet 650 mg  650 mg Oral Q6H PRN  
 ondansetron (ZOFRAN) injection 4 mg  4 mg IntraVENous Q6H PRN  
 sodium chloride (NS) flush 5-40 mL  5-40 mL IntraVENous Q8H  
 sodium chloride (NS) flush 5-40 mL  5-40 mL IntraVENous PRN  
 heparin (porcine) injection 5,000 Units  5,000 Units SubCUTAneous Q8H  
 
 
 LAB AND IMAGING FINDINGS:  
 
Lab Results Component Value Date/Time WBC 6.4 06/08/2019 05:13 AM  
 HGB 7.6 (L) 06/08/2019 05:13 AM  
 PLATELET 692 15/83/6297 05:13 AM  
 
Lab Results Component Value Date/Time Sodium 141 06/10/2019 02:34 AM  
 Potassium 3.8 06/10/2019 02:34 AM  
 Chloride 105 06/10/2019 02:34 AM  
 CO2 29 06/10/2019 02:34 AM  
 BUN 45 (H) 06/10/2019 02:34 AM  
 Creatinine 1.85 (H) 06/10/2019 02:34 AM  
 Calcium 8.3 (L) 06/10/2019 02:34 AM  
 Magnesium 2.1 06/07/2019 05:32 AM  
  
Lab Results Component Value Date/Time AST (SGOT) 29 06/06/2019 05:38 AM  
 Alk. phosphatase 85 06/06/2019 05:38 AM  
 Protein, total 6.0 (L) 06/06/2019 05:38 AM  
 Albumin 2.7 (L) 06/06/2019 05:38 AM  
 Globulin 3.3 06/06/2019 05:38 AM  
 
Lab Results Component Value Date/Time INR 1.0 05/08/2013 12:38 AM  
 Prothrombin time 13.0 05/08/2013 12:38 AM  
 aPTT 41.1 (H) 06/06/2019 05:38 AM  
  
No results found for: IRON, FE, TIBC, IBCT, PSAT, FERR No results found for: PH, PCO2, PO2 No components found for: [place] Lab Results Component Value Date/Time CK 69 06/05/2019 10:00 AM  
 CK - MB 2.0 06/05/2019 10:00 AM  
  
 
   
 
Total time: 50 minutes Counseling / coordination time, spent as noted above: 40 minutes > 50% counseling / coordination: patient, family and caregivers. Prolonged service was provided for  []30 min   []75 min in face to face time in the presence of the patient, spent as noted above. Time Start:  
Time End:  
Note: this can only be billed with 25546 (initial) or 60357 (follow up). If multiple start / stop times, list each separately.

## 2019-06-10 NOTE — PROGRESS NOTES
[facility] Hospitalist Group Progress Note Patient: Shira Sun Age: 80 y.o. : 1922 MR#: [id number] SSN: [id number] Date/Time: 6/10/2019 Subjective: Pt feels lot better, denies any complaints,wants to go home Son and family at bedside. Assessment/Plan: 1. Acute diastolic HF, Echo noted, EF 55%, cardiology in put noted, cont PO lasix. 4 L neg balance 2. Fever due to UTI, doubt CAP: d/w micro urine Cx will not be final till tomorrow, cont ceftriaxone and Zithromax. 3. Elevated trop: ? Demand ischemia due to # 1, doubt PE, neg LE duplex for DVT. No need for any further work up. 4. Acute hypoxic Resp failure - likely from #1: better, cont O2 and wean as tolerated 5. Anemia, H/H stable: no signs of bleeding 6. HTN: BP high, isolated number, will repeat BP and adjust med's   
7. H/o CAD with mild elevated trop: plan per cardiology, cont asa, BB and statin 8. Chronic anemia - monitor H&H 9. CKD 3: will cont to monitor creatinine since pt is being diuresed 10. H/o CVA: supportive care 11. Generalized debility: cont PT/OT 12. Moderate malnutrition - Nutrition consult 13. Alz dementia: supportive care 14. DNR  
DVT Px - Heparin D/w son Brissa Jobs  - POA in detail at bedside, updated all test results and plan care. He wants her to go home soon but agreed with discharge plan tomorrow based on Cx Dispo: home with [facility] tomorrow once Cx final   
 
 
Case discussed with:  [x]Patient  [x]Family  [x]Nursing  []Case Management DVT Prophylaxis:  []Lovenox  [x]Hep SQ  []SCDs  []Coumadin   []On Heparin gtt Objective:  
VS:  
Visit Vitals /61 (BP 1 Location: Right arm) Pulse 70 Temp 97.4 °F (36.3 °C) Resp 17 Ht 4' 11\" (1.499 m) Wt 55.3 kg (122 lb) SpO2 98% Breastfeeding? No  
BMI 24.64 kg/m² Tmax/24hrs: Temp (24hrs), Av.9 °F (36.6 °C), Min:97.4 °F (36.3 °C), Max:98.3 °F (36.8 °C) IOBRIEF 
 
 Intake/Output Summary (Last 24 hours) at 6/10/2019 1010 Last data filed at 6/9/2019 1806 Gross per 24 hour Intake 240 ml Output 250 ml Net -10 ml General:  Alert, cooperative, no acute distress   
Pulmonary: Bilaterally clear, no rales. No Wheezing. Cardiovascular: Regular rate and Rhythm. GI:  Soft, Non distended, Non tender. + Bowel sounds. Extremities:  No edema. No calf tenderness. Neurologic: Alert and oriented X 1-2. Moves all ext. Additional: 
 
Medications:  
Current Facility-Administered Medications Medication Dose Route Frequency  furosemide (LASIX) tablet 20 mg  20 mg Oral ONCE  
 cefTRIAXone (ROCEPHIN) 1 g in sterile water (preservative free) 10 mL IV syringe  1 g IntraVENous Q24H  
 azithromycin (ZITHROMAX) 500 mg in 0.9% sodium chloride 250 mL IVPB  500 mg IntraVENous Q24H  
 furosemide (LASIX) tablet 40 mg  40 mg Oral DAILY  aspirin delayed-release tablet 81 mg  81 mg Oral DAILY  felodipine (PLENDIL SR) 24 hr tablet 10 mg  10 mg Oral DAILY  clopidogrel (PLAVIX) tablet 75 mg  75 mg Oral DAILY  cloNIDine HCl (CATAPRES) tablet 0.1 mg  0.1 mg Oral BID  simvastatin (ZOCOR) tablet 20 mg  20 mg Oral QHS  carvedilol (COREG) tablet 12.5 mg  12.5 mg Oral BID WITH MEALS  ferrous sulfate tablet 325 mg  325 mg Oral ACB  acetaminophen (TYLENOL) tablet 650 mg  650 mg Oral Q6H PRN  
 ondansetron (ZOFRAN) injection 4 mg  4 mg IntraVENous Q6H PRN  
 sodium chloride (NS) flush 5-40 mL  5-40 mL IntraVENous Q8H  
 sodium chloride (NS) flush 5-40 mL  5-40 mL IntraVENous PRN  
 heparin (porcine) injection 5,000 Units  5,000 Units SubCUTAneous Q8H Labs:   
Recent Results (from the past 24 hour(s)) METABOLIC PANEL, BASIC Collection Time: 06/10/19  2:34 AM  
Result Value Ref Range Sodium 141 136 - 145 mmol/L Potassium 3.8 3.5 - 5.5 mmol/L Chloride 105 100 - 108 mmol/L  
 CO2 29 21 - 32 mmol/L  Anion gap 7 3.0 - 18 mmol/L  
 Glucose 154 (H) 74 - 99 mg/dL BUN 45 (H) 7.0 - 18 MG/DL Creatinine 1.85 (H) 0.6 - 1.3 MG/DL  
 BUN/Creatinine ratio 24 (H) 12 - 20 GFR est AA 31 (L) >60 ml/min/1.73m2 GFR est non-AA 25 (L) >60 ml/min/1.73m2 Calcium 8.3 (L) 8.5 - 10.1 MG/DL Signed By: Crow Diaz MD   
 Scarlett 10, 2019

## 2019-06-10 NOTE — PROGRESS NOTES
Problem: Mobility Impaired (Adult and Pediatric) Goal: *Acute Goals and Plan of Care (Insert Text) Description Physical Therapy Goals Initiated 6/7/2019 and to be accomplished within 7 day(s) 1. Patient will move from supine to sit and sit to supine  and scoot up and down in bed with minimal assistance/contact guard assist.    
2.  Patient will transfer from bed to chair and chair to bed with minimal assistance/contact guard assist using the least restrictive device. 3.  Patient will perform sit to stand with minimal assistance/contact guard assist. 
4.  Patient will ambulate with minimal assistance/contact guard assist for 75 feet with the least restrictive device. 5.  Patient will ascend/descend 4 stairs with 1 handrail(s) with minimal assistance/contact guard assist.  
 
Prior Level of Function: Pt lives in 1 story home with 4 JOAQUIN and family members available to assist prn. Per pt's son, nursing aid comes q/d but pt was otherwise independent with most mobility. Outcome: Progressing Towards Goal 
 PHYSICAL THERAPY TREATMENT Patient: Meena Singh (2 y.o. female) Date: 6/10/2019 Diagnosis: Acute CHF (congestive heart failure) ([facility] [address] [I50.9] CHF exacerbation ([facility] [address] [I50.9] <principal problem not specified> Precautions: Fall ASSESSMENT: 
Pt found supine HOB elevated willing to participate w/ therapy. Pt is [place], able to follow commands well w/ time. Pt presented on room air, however family stated pt has been using 1L of O2 PRN. As pt voiced slight dyspnea on exertion and increased RR, 1L applied w/ measurements at 96%. Kept 1L donned for extent of gait training, except for end per recs of MD to amb on room air. Pt able to safely perform all mobility tasks this visit w/ assistance. Pt req min A to obtain an upright sitting posture and able to maintain fair + sitting balance w/ difficulties only when scooting forward.  Pt stood from elevated bed height to mimic home w/ min A and cueing for sequencing. Pt displayed fair + standing balance w/ RW and amb for a total of 5' from bed to R Genevieve Fine 23 for transport to steps. Pt req cueing for walker negotiation as pt amb outside of ROLO w/ turns. Pt able to safely perform 3 steps this visit w/ use of both hand rails req min A. Pt and family educated on positioning techniques for safety w/ steps and for pacing 2/2 to increased fatigue and RR w/ training. Pt returned to room via WC, doffed 1L of O2 and approx 3 min re-measured SPO2 on room air at 96%. Pt amb to bed for an additional 5' and returned to supine at a CGA level maintaining 96% on room air. Pt provided w/ all needs in reach, provided family w/ additional education/recommendations w/ safe mobility in home, and left pt in supine w/ family. From a PT standpoint, pt would greatly benefit from [facility] w/ 24/7 assistance at home. Progression toward goals: good ? Improving appropriately and progressing toward goals ? Improving slowly and progressing toward goals ? Not making progress toward goals and plan of care will be adjusted PLAN: 
Patient continues to benefit from skilled intervention to address the above impairments. Continue treatment per established plan of care. Discharge Recommendations:  Home Health q/ 24/7 assistance Further Equipment Recommendations for Discharge:  bedside commode and rolling walker SUBJECTIVE:  
Patient stated ? I'm old.? OBJECTIVE DATA SUMMARY:  
Critical Behavior: 
Neurologic State: Alert, Confused Orientation Level: Disoriented to place, Disoriented to situation, Disoriented to time, Oriented to person Cognition: Impaired decision making Safety/Judgement: Fall prevention Functional Mobility Training: 
Bed Mobility: 
Supine to Sit: Minimum assistance;Contact guard assistance Sit to Supine: Contact guard assistance Scooting: Contact guard assistance Transfers: 
Sit to Stand: Minimum assistance Balance: 
Sitting: Impaired; With support Sitting - Static: Fair (occasional)(+) Standing: Impaired; With support Standing - Static: Fair(+) Standing - Dynamic : Fair(+) Ambulation/Gait Training: 
Distance (ft): 10 Feet (ft) Assistive Device: Walker, rolling Ambulation - Level of Assistance: Minimal assistance;Contact guard assistance Gait Abnormalities: Decreased step clearance Base of Support: Center of gravity altered; Widened Speed/Saige: Slow Step Length: Right shortened;Left shortened Stairs: 
Number of Stairs Trained: 3 Stairs - Level of Assistance: Minimum assistance; Moderate assistance Rail Use: Both 
 
 
Pain: 
Pain level pre-treatment: 0/10 Pain level post-treatment: 0/10 Activity Tolerance:  
Good Please refer to the flowsheet for vital signs taken during this treatment. After treatment:  
? Patient left in no apparent distress sitting up in chair ? Patient left in no apparent distress in bed 
? Call bell left within reach ? Nursing notified ? Caregiver present ? Bed alarm activated ? SCDs applied COMMUNICATION/EDUCATION:  
?         Role of Physical Therapy in the acute care setting. ?         Fall prevention education was provided and the patient/caregiver indicated understanding. ? Patient/family have participated as able in working toward goals and plan of care. ?         Patient/family agree to work toward stated goals and plan of care. ?         Patient understands intent and goals of therapy, but is neutral about his/her participation. ? Patient is unable to participate in stated goals/plan of care: ongoing with therapy staff. ?         Other: 
 
   
Mira Kent PTA Time Calculation: 38 mins

## 2019-06-10 NOTE — PROGRESS NOTES
Assuming care Pnt resting quietly in bed Verbal alert and oriented RR even and unlabored No complaints voiced @ this time Pnt stable Family @ bedside Will continue to monitor NADN

## 2019-06-10 NOTE — ACP (ADVANCE CARE PLANNING)
Palliative Medicine Consult DR. [facility]: [phone] ([phone]) [facility]: [phone] [facility]: [phone] Time In: 4462 Time Out: 1100 Palliative Medicine Team Dewayne Taylor, NP, Chaplain Desean, and this LCSW) met with patient, caregiver Izzy Franco, and son Waqar Zazueta in room. Patient was hospitalized r/t CHF exacerbation and SOB. She also has hx CAD, CVA, generalized debility, colon CA, and moderate malnutrition. Patient has an AMD on file naming the following medical decision-makers: 
 
Primary Healthcare Agent: Waqar Zazueta III Relationship: Son Phone Number: [phone] Secondary Healthcare Agent: Sandracharisses Barrier Relationship: Son Phone Number: [phone] Patient has a DDNR on file. PM Team reviewed the process of intubation with son and Je Patterson stated that patient should not be intubated under any circumstance, but would be okay with BiPAP/CPAP/high flow if needed. Patient status is DNR/DNI. Je Patterson stated that patient has always said she would not want any \"mechanical\" life-prolonging measures taken. Per son, patient's   on May 15th of this year at the age of 80. Je Patterson reported that his father suffered from dementia. Patient and her  lived in [address] residence. After her 's death, patient moved back to [place]. Marquis's son Tova Bush (patient's grandson) resides with patient. Prior to hospitalization, patient was reported to have been independent with ADLs, but does have caregivers 24/7 in her home. Son reported that patient worked with PT prior to PM Team arrival and that patient was able to go up 3 stairs. Patient resides in a one-story home. Patient was able to answer some questions appropriately. When asked why she had come to the hospital, patient stated, \"Old age. \" She is hard of hearing. Patient repeatedly said, \"Every day is a good day\" throughout the meeting. Son is not interested in a hospice evaluation at this time, but stated, \"That's what I would want for her when the time comes. \" Arabella Alanis said that he understands the hospice benefit, reporting, \"I just went through that with my father. \" Patient reported that her goal is to go home; per hospitalist note, d/c anticipated 06/11/19 with [facility] services. PM Team to f/u as appropriate.

## 2019-06-10 NOTE — PROGRESS NOTES
Attempted to see patient; patient off the unit. Will attempt to see pt later today. Caleb Crook RN BSN Case Management [phone]

## 2019-06-11 VITALS
TEMPERATURE: 98.2 F | WEIGHT: 121.91 LBS | BODY MASS INDEX: 24.58 KG/M2 | HEIGHT: 59 IN | DIASTOLIC BLOOD PRESSURE: 46 MMHG | RESPIRATION RATE: 16 BRPM | HEART RATE: 84 BPM | OXYGEN SATURATION: 91 % | SYSTOLIC BLOOD PRESSURE: 130 MMHG

## 2019-06-11 LAB
BACTERIA SPEC CULT: ABNORMAL
BACTERIA SPEC CULT: ABNORMAL
SERVICE CMNT-IMP: ABNORMAL

## 2019-06-11 PROCEDURE — 74011000250 HC RX REV CODE- 250: Performed by: INTERNAL MEDICINE

## 2019-06-11 PROCEDURE — 74011250637 HC RX REV CODE- 250/637: Performed by: HOSPITALIST

## 2019-06-11 PROCEDURE — 74011250636 HC RX REV CODE- 250/636: Performed by: INTERNAL MEDICINE

## 2019-06-11 PROCEDURE — 74011250636 HC RX REV CODE- 250/636: Performed by: HOSPITALIST

## 2019-06-11 RX ORDER — CLONIDINE HYDROCHLORIDE 0.1 MG/1
0.1 TABLET ORAL
Status: COMPLETED | OUTPATIENT
Start: 2019-06-11 | End: 2019-06-11

## 2019-06-11 RX ORDER — CLONIDINE HYDROCHLORIDE 0.2 MG/1
0.2 TABLET ORAL 2 TIMES DAILY
Qty: 60 TAB | Refills: 0 | Status: SHIPPED | OUTPATIENT
Start: 2019-06-11 | End: 2020-01-01

## 2019-06-11 RX ORDER — CIPROFLOXACIN 500 MG/1
500 TABLET ORAL 2 TIMES DAILY
Qty: 6 TAB | Refills: 0 | Status: SHIPPED | OUTPATIENT
Start: 2019-06-11 | End: 2019-06-14

## 2019-06-11 RX ORDER — CLONIDINE HYDROCHLORIDE 0.1 MG/1
0.2 TABLET ORAL 2 TIMES DAILY
Status: DISCONTINUED | OUTPATIENT
Start: 2019-06-11 | End: 2019-06-11 | Stop reason: HOSPADM

## 2019-06-11 RX ADMIN — ASPIRIN 81 MG: 81 TABLET, COATED ORAL at 09:13

## 2019-06-11 RX ADMIN — FELODIPINE 10 MG: 5 TABLET, EXTENDED RELEASE ORAL at 09:12

## 2019-06-11 RX ADMIN — HEPARIN SODIUM 5000 UNITS: 5000 INJECTION INTRAVENOUS; SUBCUTANEOUS at 06:02

## 2019-06-11 RX ADMIN — Medication 10 ML: at 06:03

## 2019-06-11 RX ADMIN — CLONIDINE HYDROCHLORIDE 0.1 MG: 0.1 TABLET ORAL at 12:40

## 2019-06-11 RX ADMIN — CLONIDINE HYDROCHLORIDE 0.1 MG: 0.1 TABLET ORAL at 09:13

## 2019-06-11 RX ADMIN — CLOPIDOGREL BISULFATE 75 MG: 75 TABLET, FILM COATED ORAL at 09:13

## 2019-06-11 RX ADMIN — CARVEDILOL 12.5 MG: 12.5 TABLET, FILM COATED ORAL at 09:13

## 2019-06-11 RX ADMIN — FERROUS SULFATE TAB 325 MG (65 MG ELEMENTAL FE) 325 MG: 325 (65 FE) TAB at 09:12

## 2019-06-11 RX ADMIN — FUROSEMIDE 40 MG: 40 TABLET ORAL at 09:13

## 2019-06-11 RX ADMIN — CEFTRIAXONE SODIUM 1 G: 1 INJECTION, POWDER, FOR SOLUTION INTRAMUSCULAR; INTRAVENOUS at 00:20

## 2019-06-11 RX ADMIN — AZITHROMYCIN MONOHYDRATE 500 MG: 500 INJECTION, POWDER, LYOPHILIZED, FOR SOLUTION INTRAVENOUS at 00:20

## 2019-06-11 NOTE — PROGRESS NOTES
Discharge Planning: 
Discharge order noted for today. Pt has been accepted to [facility]. Met with patient and her son Dileep Quintero  and are agreeable to the transition plan today. Transport has been arranged through pt's family . Patient's discharge summary and home health  orders have been forwarded to [facility] health  agency via [address] . Updated bedside RN, Melissa Estrada,  to the transition plan. Discharge information has been documented on the AVS. [facility] Care Manager

## 2019-06-11 NOTE — ACP (ADVANCE CARE PLANNING)
GOALS OF CARE:Patient / health care proxy stated goals: To discharge to home with 24/7 caregiver and limited medical interventions. TREATMENT PREFERENCES:  Code Status: DNR/DNI Advance Care Planning: 
 
  Primary Decision Maker: Elana Hines Son - [phone] Secondary Decision Maker: Barbie Brown Son - [phone] Advance Care Planning 6/11/2019 Patient's Healthcare Decision Maker is: - Confirm Advance Directive Yes, on file Does the patient have other document types MOST/MOLST/POST/POLST;Do Not Resuscitate The palliative care team has discussed with patient / health care proxy about goals of care / treatment preferences for patient. Patients's son Digna Duval III open to completing POST form. Form completed with DNR/DNI, NO FEEDING Tube, and Limited Medical Interventions. Copies made for medical record for scanning and 3 copies to family with the original.  
 
Royal Dumont RN, [facility] Palliative Medicine Inpatient RN DR. RIVERA [facility] Palliative COPE Line: [phone]

## 2019-06-11 NOTE — DISCHARGE INSTRUCTIONS
Discharge Instructions    Patient: Shira Sun MRN: [id number]  CSN: [id number]    YOB: 1922  Age: 80 y.o. Sex: female    DOA: 6/5/2019 LOS:  LOS: 6 days   Discharge Date:      DIET:  Cardiac Diet    ACTIVITY: Activity as tolerated  Home health care for Skilled care for CHF tele, Hypertension and medication management    ·    PT/OT consult  ·             Speech consult      ADDITIONAL INFORMATION: If you experience any of the following symptoms but not limited to Fever, chills, nausea, vomiting, diarrhea, change in mentation, falling, bleeding, shortness of breath, chest pain, please call your primary care physician or return to the emergency room if you cannot get hold of your doctor:     FOLLOW UP CARE:  Dr. Elpidio Holm MD in 5-7 days. Please call and set up an appointment. Dr. Lucia Garcia, cardio in 2 week    Paula Saldaña MD  6/11/2019 9:56 AM        Patient Education     Patient Education   Patient Education      Clonidine (Catapres, Kapvay, Kapvay Dose Pack) - (By mouth)   Why this medicine is used:   Treats high blood pressure. Also treats ADHD. Contact a nurse or doctor right away if you have:  · Fast, slow, pounding, or uneven heartbeat  · Lightheadedness, dizziness, fainting     Common side effects:  · Tiredness  · Constipation, stomach pain  · Dry eyes, mouth, and throat  · Headache  © 2017 [facility] Information is for End User's use only and may not be sold, redistributed or otherwise used for commercial purposes. Ciprofloxacin Betaine/Ciprofloxacin Hydrochloride (Cipro XR) - (By mouth)   Why this medicine is used:   Treats a urinary tract infection or kidney infection.   Contact a nurse or doctor right away if you have:  · Chest pain, fast, slow, pounding, or uneven heartbeat  · Blistering, peeling, or red skin rash  · Confusion, unusual thoughts or behavior  · Seizures, severe headache, fever  · Blood in your urine, or change in how much or how often you urinate, yellow skin or eyes  · Nausea, vomiting, or stomach pain, diarrhea, vaginal itching  · Numbness, tingling, weakness, or burning pain in your hands, arms, legs, or feet  · Pain or swelling in your knee, ankle, shoulder, elbow, hand, or wrist   © 2017 [facility] Information is for End User's use only and may not be sold, redistributed or otherwise used for commercial purposes. Heart Failure: Care Instructions  Your Care Instructions    Heart failure occurs when your heart does not pump as much blood as the body needs. Failure does not mean that the heart has stopped pumping but rather that it is not pumping as well as it should. Over time, this causes fluid buildup in your lungs and other parts of your body. Fluid buildup can cause shortness of breath, fatigue, swollen ankles, and other problems. By taking medicines regularly, reducing sodium (salt) in your diet, checking your weight every day, and making lifestyle changes, you can feel better and live longer. Follow-up care is a key part of your treatment and safety. Be sure to make and go to all appointments, and call your doctor if you are having problems. It's also a good idea to know your test results and keep a list of the medicines you take. How can you care for yourself at home? Medicines    · Be safe with medicines. Take your medicines exactly as prescribed. Call your doctor if you think you are having a problem with your medicine.     · Do not take any vitamins, over-the-counter medicine, or herbal products without talking to your doctor first. Elmyra Savers not take ibuprofen (Advil or Motrin) and naproxen (Aleve) without talking to your doctor first. They could make your heart failure worse.     · You may be taking some of the following medicine. ? Beta-blockers can slow heart rate, decrease blood pressure, and improve your condition.  Taking a beta-blocker may lower your chance of needing to be hospitalized. ? Angiotensin-converting enzyme inhibitors (ACEIs) reduce the heart's workload, lower blood pressure, and reduce swelling. Taking an ACEI may lower your chance of needing to be hospitalized again. ? Angiotensin II receptor blockers (ARBs) work like ACEIs. Your doctor may prescribe them instead of ACEIs. ? Diuretics, also called water pills, reduce swelling. ? Potassium supplements replace this important mineral, which is sometimes lost with diuretics. ? Aspirin and other blood thinners prevent blood clots, which can cause a stroke or heart attack.    You will get more details on the specific medicines your doctor prescribes. Diet    · Your doctor may suggest that you limit sodium to 2,000 milligrams (mg) a day or less. That is less than 1 teaspoon of salt a day, including all the salt you eat in cooking or in packaged foods. People get most of their sodium from processed foods. Fast food and restaurant meals also tend to be very high in sodium.     · Ask your doctor how much liquid you can drink each day. You may have to limit liquids.    Weight    · Weigh yourself without clothing at the same time each day. Record your weight. Call your doctor if you have a sudden weight gain, such as more than 2 to 3 pounds in a day or 5 pounds in a week. (Your doctor may suggest a different range of weight gain.) A sudden weight gain may mean that your heart failure is getting worse.    Activity level    · Start light exercise (if your doctor says it is okay). Even if you can only do a small amount, exercise will help you get stronger, have more energy, and manage your weight and your stress. Walking is an easy way to get exercise. Start out by walking a little more than you did before. Bit by bit, increase the amount you walk.     · When you exercise, watch for signs that your heart is working too hard. You are pushing yourself too hard if you cannot talk while you are exercising.  If you become short of breath or dizzy or have chest pain, stop, sit down, and rest.     · If you feel \"wiped out\" the day after you exercise, walk slower or for a shorter distance until you can work up to a better pace.     · Get enough rest at night. Sleeping with 1 or 2 pillows under your upper body and head may help you breathe easier.    Lifestyle changes    · Do not smoke. Smoking can make a heart condition worse. If you need help quitting, talk to your doctor about stop-smoking programs and medicines. These can increase your chances of quitting for good. Quitting smoking may be the most important step you can take to protect your heart.     · Limit alcohol to 2 drinks a day for men and 1 drink a day for women. Too much alcohol can cause health problems.     · Avoid getting sick from colds and the flu. Get a pneumococcal vaccine shot. If you have had one before, ask your doctor whether you need another dose. Get a flu shot each year. If you must be around people with colds or the flu, wash your hands often. When should you call for help? Call 911 if you have symptoms of sudden heart failure such as:    · You have severe trouble breathing.     · You cough up pink, foamy mucus.     · You have a new irregular or rapid heartbeat.    Call your doctor now or seek immediate medical care if:    · You have new or increased shortness of breath.     · You are dizzy or lightheaded, or you feel like you may faint.     · You have sudden weight gain, such as more than 2 to 3 pounds in a day or 5 pounds in a week. (Your doctor may suggest a different range of weight gain.)     · You have increased swelling in your legs, ankles, or feet.     · You are suddenly so tired or weak that you cannot do your usual activities.    Watch closely for changes in your health, and be sure to contact your doctor if you develop new symptoms. Where can you learn more? Go to http://mónica-shirin.info/.   Enter N893 in the search box to learn more about \"Heart Failure: Care Instructions. \"  Current as of: July 22, 2018  Content Version: 11.9  © 2399-5813 [employer]. Care instructions adapted under license by [employer] (which disclaims liability or warranty for this information). If you have questions about a medical condition or this instruction, always ask your healthcare professional. [address] any warranty or liability for your use of this information.

## 2019-06-11 NOTE — DISCHARGE SUMMARY
Discharge Summary Patient: Ba Sprague MRN: [id number]  CSN: [id number] YOB: 1922  Age: 80 y.o. Sex: female DOA: 6/5/2019 LOS:  LOS: 6 days   Discharge Date:   
 
Disposition: Home with [facility] AT [facility] Admission Diagnoses: Acute CHF (congestive heart failure) ([facility] [address] [I50.9] CHF exacerbation ([facility] [address] [I50.9] Discharge Diagnoses:  PLEASE SEE DICTATION. Discharge Condition: Stable PHYSICAL EXAM 
Visit Vitals /73 Pulse 84 Temp 98.2 °F (36.8 °C) Resp 16 Ht 4' 11\" (1.499 m) Wt 55.3 kg (121 lb 14.6 oz) SpO2 91% Breastfeeding? No  
BMI 24.62 kg/m² General:  Alert, cooperative, no acute distress   
Pulmonary: Bilaterally clear, no rales. No Wheezing. Cardiovascular: Regular rate and Rhythm. GI:  Soft, Non distended, Non tender. + Bowel sounds. Extremities:  No edema. No calf tenderness. Neurologic: Alert and oriented X 1-2. Moves all ext. Hospital Course: Please see dictation. code # [id number]. Discharge Medications:    
Current Discharge Medication List  
  
START taking these medications Details  
ciprofloxacin HCl (CIPRO) 500 mg tablet Take 1 Tab by mouth two (2) times a day for 3 days. Qty: 6 Tab, Refills: 0 CONTINUE these medications which have CHANGED Details  
cloNIDine HCl (CATAPRES) 0.2 mg tablet Take 1 Tab by mouth two (2) times a day. Qty: 60 Tab, Refills: 0 CONTINUE these medications which have NOT CHANGED Details  
diazePAM (VALIUM) 5 mg tablet Take 5 mg by mouth nightly. Per patient provided note, takes 2.5mg (1/2 tablet) nightly  
  
memantine ER (NAMENDA XR) 21 mg capsule Take 21 mg by mouth daily. folic acid/multivit-min/lutein (CENTRUM SILVER PO) Take 1 Tab by mouth daily. allopurinol (ZYLOPRIM) 100 mg tablet Take 100 mg by mouth daily. atorvastatin (LIPITOR) 20 mg tablet Take 20 mg by mouth daily. loratadine (CLARITIN) 10 mg tablet Take 10 mg by mouth daily as needed for Allergies. clopidogrel (PLAVIX) 75 mg tab Take 75 mg by mouth daily. ferrous sulfate 325 mg (65 mg iron) tablet Take 325 mg by mouth Daily (before breakfast). donepezil (ARICEPT) 10 mg tablet Take 10 mg by mouth nightly. carvedilol (COREG) 12.5 mg tablet TAKE 1 TABLET TWICE A DAY WITH MEALS Qty: 180 Tab, Refills: 2  
  
furosemide (LASIX) 40 mg tablet Take 1 Tab by mouth daily. Qty: 90 Tab, Refills: 5 Associated Diagnoses: Medication refill  
  
felodipine (PLENDIL SR) 10 mg 24 hr tablet Take 1 Tab by mouth daily. Qty: 180 Tab, Refills: 5  
  
aspirin delayed-release 81 mg tablet Take 81 mg by mouth daily. STOP taking these medications  
  
 simvastatin (ZOCOR) 20 mg tablet Comments:  
Reason for Stopping:   
   
 memantine (NAMENDA) 10 mg tablet Comments:  
Reason for Stopping: · It is important that you take the medication exactly as they are prescribed. · Keep your medication in the bottles provided by the pharmacist and keep a list of the medication names, dosages, and times to be taken in your wallet. · Do not take other medications without consulting your doctor. DIET:  Cardiac Diet ACTIVITY: Activity as tolerated Home health care for Skilled care for CHF tele, Hypertension and medication management 
 
·    PT/OT consult ·             Speech consult ADDITIONAL INFORMATION: If you experience any of the following symptoms but not limited to Fever, chills, nausea, vomiting, diarrhea, change in mentation, falling, bleeding, shortness of breath, chest pain, please call your primary care physician or return to the emergency room if you cannot get hold of your doctor:  
 
FOLLOW UP CARE: 
Dr. Nany Cheek MD in 5-7 days. Please call and set up an appointment. Dr. Vicenet Estrada cardio in 2 week Minutes spent on discharge: 40 minutes spent coordinating this discharge (review instructions/follow-up, prescriptions, preparing report for sign off) Arina Goldsmith MD 
6/11/2019 9:57 AM

## 2019-06-11 NOTE — PROGRESS NOTES
Problem: Falls - Risk of 
Goal: *Absence of Falls Description Document Thee President Fall Risk and appropriate interventions in the flowsheet. Outcome: Progressing Towards Goal 
  
Problem: Patient Education: Go to Patient Education Activity Goal: Patient/Family Education Outcome: Progressing Towards Goal 
  
Problem: Pressure Injury - Risk of 
Goal: *Prevention of pressure injury Description Document Shay Scale and appropriate interventions in the flowsheet. Outcome: Progressing Towards Goal 
  
Problem: Patient Education: Go to Patient Education Activity Goal: Patient/Family Education Outcome: Progressing Towards Goal 
  
Problem: Patient Education: Go to Patient Education Activity Goal: Patient/Family Education Outcome: Progressing Towards Goal 
  
Problem: Patient Education: Go to Patient Education Activity Goal: Patient/Family Education Outcome: Progressing Towards Goal 
  
Problem: Patient Education: Go to Patient Education Activity Goal: Patient/Family Education Outcome: Progressing Towards Goal

## 2019-06-11 NOTE — PROGRESS NOTES
Problem: Falls - Risk of 
Goal: *Absence of Falls Description Document Perez Arteaga Fall Risk and appropriate interventions in the flowsheet. Outcome: Progressing Towards Goal 
  
Problem: Patient Education: Go to Patient Education Activity Goal: Patient/Family Education Outcome: Progressing Towards Goal 
  
Problem: Pressure Injury - Risk of 
Goal: *Prevention of pressure injury Description Document Shay Scale and appropriate interventions in the flowsheet. Outcome: Progressing Towards Goal 
  
Problem: Patient Education: Go to Patient Education Activity Goal: Patient/Family Education Outcome: Progressing Towards Goal 
  
Problem: Patient Education: Go to Patient Education Activity Goal: Patient/Family Education Outcome: Progressing Towards Goal 
  
Problem: Patient Education: Go to Patient Education Activity Goal: Patient/Family Education Outcome: Progressing Towards Goal 
  
Problem: Patient Education: Go to Patient Education Activity Goal: Patient/Family Education Outcome: Progressing Towards Goal

## 2019-06-11 NOTE — CONSULTS
Palliative Medicine Consult [facility]: [phone] ([phone]) [facility]: [phone] [facility]: [phone] Patient Name: Sonia Felix YOB: 1922 Date of Initial Consult: 6/10/2019, follow up 6/11/2019 Reason for Consult: care decisions Requesting Provider: Crow Diaz MD  
Primary Care Physician: Tenny Riedel, MD 
  
 SUMMARY:  
Sonia Felix is a 80 y.o. female with a past history of colon cancer with colectomy, CHF, CAD, HTN, CKD3, dementia, anemia, anxiety, CVA who was admitted on 6/5/2019 from home with a diagnosis of acute on chronic HF and UTI. Current medical issues leading to Palliative Medicine involvement include: advanced age, CHF, dementia and goals of care. 6/11/2019: Patient laying in bed, smiling. NAD. Marquis and her caretaker Lisa Dose at bedside. Discussion on the benefits of POST form completion. Per yesterday's conversation, Viktor Sosa decided on NO resuscitation in the event of cardiac or respiratory arrest. If patient's health declines, no intubation for respiratory distress, no escalation of care including transfer to ICU or pressors. DDNR was signed and on chart. Today, we discussed the benefits and burdens of tube feedings, and son states that patient would never want a feeding tube. POST completed: DDNR, limited interventions, with no feeding tube. PALLIATIVE DIAGNOSES:  
1. Advanced care decisions 2. Acute on chronic heart failure 3. Hypoxic respiratory failure 4. UTI PLAN:  
6/11/2019: Palliative medicine team including Lovely Perera, RN, Lyn He NP, and myself met with patient at bedside. Patient laying in bed, smiling. NAD. Denies concern. Hard of hearing. Oriented x 2. Marquis and her caretaker Lisa Dose at bedside. Discussion on the benefits of POST form completion.  Per yesterday's conversation, Viktor Sosa decided on NO resuscitation in the event of cardiac or respiratory arrest. If patient's health declines, no intubation for respiratory distress, no escalation of care including transfer to ICU or pressors. DDNR was signed and on chart. Today we discussed the benefits and burdens of tube feedings, and son states that patient would never want a feeding tube. Viktor Sosa made it clear that he and patient have had end of life conversations before she became ill, and he knows her wishes and will respect her decisions. POST completed and signed by Viktor Sosa (MPOA): DDNR, limited interventions, with no feeding tube. 1. Advanced care decisions: Palliative care team including Margarita Lopez, LIV, Jadon Graf,  and myself met with patient at bedside. Present also was patient's son: Cindy Ace and her caregiver: Lisa Alyssa. Patient is a ; her   from complications of dementia a month ago on 5/15/2019. He had been receiving hospice care. Prior to that date, the patient and her  had lived with Viktor Sosa in [place], [place]. Patient has since moved back to this area to live with her grandson, Marques Martinez, and has caregivers 12 hours/day 7 days/week. Her caregiver reports prior to becoming ill, she had been independent with all ADL's and used walker to ambulate. Patient is [place], especially left ear. Spoke with son about goals of care. DDNR and AMD are already signed and on chart. Primary MPOA is Cindy Ace with back up being [employer]. Clarified goals of care. Patient wishes include no resuscitation in the event of cardiac or respiratory arrest. If patient health declines, no intubation for respiratory distress, no escalation of care including transfer to ICU or pressors. DDNR signed and on chart. 2. Acute on chronic heart failure: BNP on admission 25,638. CXR: Bilateral pleural effusion, septal line thickening, and cardiomegaly suggestive of developing CHF. Underlying COPD. Patient initiated on BiPap and lasix. Cardiac echo with LFEF 55%. Moderate tricuspid valve regurgitation. Moderate pulm hypertension PASP 52 mmHg. Patient has been weaned to room air. Managed per primary team. 
3. Hypoxic respiratory failure: secondary to #2 above. Has been weaned to room air and denies shortness of breath. 4. UTI: UA with (+) nitrites, moderate leukocyte esterace, WBC 4-11, 4+ bacteria. Culture (+) for two gram negative lucero bacterias. Sensitivities pending. Currently on rocephin. 5. Initial consult note routed to primary continuity provider 6. Communicated plan of care with: Palliative IDT 
 
GOALS OF CARE: 
Patient/Health Care Proxy Stated Goals: Prolong life TREATMENT PREFERENCES:  
Code Status: DNR Advance Care Planning: 
Advance Care Planning 6/5/2019 Patient's Healthcare Decision Maker is: Legal Next of Kin Confirm Advance Directive Yes, not on file Medical Interventions: Limited additional interventions Artificially Administered Nutrition: No feeding tube Other: As far as possible, the palliative care team has discussed with patient / health care proxy about goals of care / treatment preferences for patient. HISTORY:  
 
History obtained from: chart, family CHIEF COMPLAINT: n/a 
 
HPI/SUBJECTIVE: The patient is:  
[x] Verbal and participatory; limited by hearing deficits, oriented x 2 [] Non-participatory due to:  
Elderly AA female in no apparent distress. Room air. Can hear better when standing on her right side. Clinical Pain Assessment (nonverbal scale for nonverbal patients): Clinical Pain Assessment Severity: 0 Activity (Movement): Lying quietly, normal position FUNCTIONAL ASSESSMENT:  
 
Palliative Performance Scale (PPS): PPS: 50 ECOG 
ECOG Status : Ambulatory, but unable to carry out work activities PSYCHOSOCIAL/SPIRITUAL SCREENING:  
  
Any spiritual / Druze concerns: 
[] Yes /  [x] No 
 
Caregiver Burnout: 
[] Yes /  [x] No /  [] No Caregiver Present Anticipatory grief assessment:  
[x] Normal  / [] Maladaptive REVIEW OF SYSTEMS:  
 
Positive and pertinent negative findings in ROS are noted above in HPI. The following systems were [x] reviewed / [] unable to be reviewed as noted in HPI Other findings are noted below. Systems: constitutional, ears/nose/mouth/throat, respiratory, gastrointestinal, genitourinary, musculoskeletal, integumentary, neurologic, psychiatric, endocrine. Positive findings noted below. Modified ESAS Completed by: provider Fatigue: 5 Pain: 0 Anxiety: 0 Nausea: 0 Anorexia: 0 Dyspnea: 0 Other Problem (Comment): 3(weakness) Stool Occurrence(s): 1 PHYSICAL EXAM:  
 
Wt Readings from Last 3 Encounters:  
06/11/19 55.3 kg (121 lb 14.6 oz) 05/01/18 67.1 kg (148 lb) 05/07/13 81.6 kg (180 lb) Blood pressure 162/63, pulse 84, temperature 98.2 °F (36.8 °C), resp. rate 16, height 4' 11\" (1.499 m), weight 55.3 kg (121 lb 14.6 oz), SpO2 91 %, not currently breastfeeding. Pain: 
Pain Scale 1: Numeric (0 - 10) Pain Intensity 1: 0 Constitutional: slender elderly AA female in no apparent distress Eyes: pupils equal, anicteric ENMT: no nasal discharge, moist mucous membranes Respiratory: breathing unlabored, symmetric Musculoskeletal: no deformity Skin: warm, dry Neurologic: following commands, moving all extremities, oriented x 2 HISTORY:  
 
Active Problems: 
  Acute CHF (congestive heart failure) ([address] (6/5/2019) CHF exacerbation ([address] (6/5/2019) Past Medical History:  
Diagnosis Date  Anemia NEC  Anxiety  Cataract   
 bilat  Colon cancer ([address] 1999  CRI (chronic renal insufficiency) 4/07  CVA (cerebral infarction) ([address] 4/01  
 right facial droop  CVA (cerebral infarction) ([address] 2/11  
 left post corona radiata  Dementia  Diverticulosis  Edema  GERD (gastroesophageal reflux disease)  Hypercholesterolemia  Hypertension  Hypomagnesemia 2/11  Lipoma of neck   
 right  Orthostatic hypotension  RBBB (right bundle branch block)  Syncope 6/08  TIA (transient ischemic attack) mid 1990's Past Surgical History:  
Procedure Laterality Date  ENDOSCOPY, COLON, DIAGNOSTIC  2006  HX BREAST BIOPSY  HX CATARACT REMOVAL  10/02  
 left  HX CATARACT REMOVAL  6/03  
 right  HX COLECTOMY  1999  
 right hemicolectomy  HX HYSTERECTOMY  HX LIPOMA RESECTION No family history on file. History reviewed, no pertinent family history. Social History Tobacco Use  Smoking status: Never Smoker Substance Use Topics  Alcohol use: No  
 
No Known Allergies Current Facility-Administered Medications Medication Dose Route Frequency  cloNIDine HCl (CATAPRES) tablet 0.2 mg  0.2 mg Oral BID  cefTRIAXone (ROCEPHIN) 1 g in sterile water (preservative free) 10 mL IV syringe  1 g IntraVENous Q24H  
 azithromycin (ZITHROMAX) 500 mg in 0.9% sodium chloride 250 mL IVPB  500 mg IntraVENous Q24H  
 furosemide (LASIX) tablet 40 mg  40 mg Oral DAILY  aspirin delayed-release tablet 81 mg  81 mg Oral DAILY  felodipine (PLENDIL SR) 24 hr tablet 10 mg  10 mg Oral DAILY  clopidogrel (PLAVIX) tablet 75 mg  75 mg Oral DAILY  simvastatin (ZOCOR) tablet 20 mg  20 mg Oral QHS  carvedilol (COREG) tablet 12.5 mg  12.5 mg Oral BID WITH MEALS  ferrous sulfate tablet 325 mg  325 mg Oral ACB  acetaminophen (TYLENOL) tablet 650 mg  650 mg Oral Q6H PRN  
 ondansetron (ZOFRAN) injection 4 mg  4 mg IntraVENous Q6H PRN  
 sodium chloride (NS) flush 5-40 mL  5-40 mL IntraVENous Q8H  
 sodium chloride (NS) flush 5-40 mL  5-40 mL IntraVENous PRN  
 heparin (porcine) injection 5,000 Units  5,000 Units SubCUTAneous Q8H  
 
 
 LAB AND IMAGING FINDINGS:  
 
Lab Results Component Value Date/Time  WBC 6.4 06/08/2019 05:13 AM  
 HGB 7.6 (L) 06/08/2019 05:13 AM  
 PLATELET 884 79/29/6588 05:13 AM  
 
 Lab Results Component Value Date/Time Sodium 141 06/10/2019 02:34 AM  
 Potassium 3.8 06/10/2019 02:34 AM  
 Chloride 105 06/10/2019 02:34 AM  
 CO2 29 06/10/2019 02:34 AM  
 BUN 45 (H) 06/10/2019 02:34 AM  
 Creatinine 1.85 (H) 06/10/2019 02:34 AM  
 Calcium 8.3 (L) 06/10/2019 02:34 AM  
 Magnesium 2.1 06/07/2019 05:32 AM  
  
Lab Results Component Value Date/Time AST (SGOT) 29 06/06/2019 05:38 AM  
 Alk. phosphatase 85 06/06/2019 05:38 AM  
 Protein, total 6.0 (L) 06/06/2019 05:38 AM  
 Albumin 2.7 (L) 06/06/2019 05:38 AM  
 Globulin 3.3 06/06/2019 05:38 AM  
 
Lab Results Component Value Date/Time INR 1.0 05/08/2013 12:38 AM  
 Prothrombin time 13.0 05/08/2013 12:38 AM  
 aPTT 41.1 (H) 06/06/2019 05:38 AM  
  
No results found for: IRON, FE, TIBC, IBCT, PSAT, FERR No results found for: PH, PCO2, PO2 No components found for: [place] Lab Results Component Value Date/Time CK 69 06/05/2019 10:00 AM  
 CK - MB 2.0 06/05/2019 10:00 AM  
  
 
   
 
Total time: 35 minutes Counseling / coordination time, spent as noted above: 25 minutes > 50% counseling / coordination: patient, family and RN. Prolonged service was provided for  []30 min   []75 min in face to face time in the presence of the patient, spent as noted above. Time Start:  
Time End:  
Note: this can only be billed with 88767 (initial) or 13445 (follow up). If multiple start / stop times, list each separately.

## 2019-06-11 NOTE — PROGRESS NOTES
I have reviewed discharge instructions with the caregiver and guardian. The caregiver and guardian verbalized understanding. Patient transport to main entrance with family.

## 2019-06-12 ENCOUNTER — PATIENT OUTREACH (OUTPATIENT)
Dept: CASE MANAGEMENT | Age: 84
End: 2019-06-12

## 2019-06-12 ENCOUNTER — PATIENT OUTREACH (OUTPATIENT)
Dept: CARDIOLOGY CLINIC | Age: 84
End: 2019-06-12

## 2019-06-12 NOTE — PROGRESS NOTES
Caro Amaro presents today for a post-hospital follow-up. She was hospitalized from 6/5/19 through 6/11/19 for acute diastolic heart failure and acute hypoxic respiratory failure requiring Bipap. She presented with complaints of shortness of breath. Her NT pro-BNP was 25,638. She was given IV lasix and her symptoms improved. Her troponins were mildly elevated and peaked at 1.10 and was felt to be due to demand ischemia. After being diuresed, she had 3.5 liters negative balance. She had an echo done and it showed an EF of 55%, moderate TR, and PASP of 52 mmHg. She also had a left pleural effusion. Lower extremity duplex study was negative for DVT. She is a 80year old female with history of hypertension, chronic kidney disease (stage 3), TIA, and Alzheimer's. She is a DNR. She was not followed by cardiology (in office) prior to this appointment although cardiology has seen her during her other hospitalizations. She was accompanied to the office today by her son and a caregiver. They provide a majority of her history. Ms. Kirk Kumari is able to answer some questions appropriately. Her caregiver states that she has been doing well at home. She has a good appetite and they ensure that she is taking her medications. They have not noticed any shortness of breath or lower extremity edema. Her caregiver states that they are adhering to dietary sodium and fluid restrictions as recommended during her hospitalization. Denies chest pain, tightness, heaviness, and palpitations. Denies shortness of breath at rest, dyspnea on exertion, orthopnea and PND. Denies abdominal bloating. Denies lightheadedness, dizziness, and syncope. Denies lower extremity edema and claudication. Denies nausea, vomiting, diarrhea, melena, hematochezia. Denies hematuria, urgency, frequency. Denies fever, chills. She is hard of hearing.       PMH:  Past Medical History:   Diagnosis Date    Anemia NEC     Anxiety     Cataract     bilat    [facility] 1999    CRI (chronic renal insufficiency) 4/07    CVA (cerebral infarction) ([facility] 4/01    right facial droop    CVA (cerebral infarction) ([facility] 2/11    left post corona radiata    Dementia     Diverticulosis     Edema     GERD (gastroesophageal reflux disease)     Hypercholesterolemia     Hypertension     Hypomagnesemia 2/11    Lipoma of neck     right    Orthostatic hypotension     RBBB (right bundle branch block)     Syncope 6/08    TIA (transient ischemic attack) mid 1990's       PSH:  Past Surgical History:   Procedure Laterality Date    ENDOSCOPY, COLON, DIAGNOSTIC  2006    HX BREAST BIOPSY      HX CATARACT REMOVAL  10/02    left    HX CATARACT REMOVAL  6/03    right    HX COLECTOMY  1999    right hemicolectomy    HX HYSTERECTOMY      HX LIPOMA RESECTION         MEDS:  Current Outpatient Medications   Medication Sig    cloNIDine HCl (CATAPRES) 0.2 mg tablet Take 1 Tab by mouth two (2) times a day.  ciprofloxacin HCl (CIPRO) 500 mg tablet Take 1 Tab by mouth two (2) times a day for 3 days.  diazePAM (VALIUM) 5 mg tablet Take 5 mg by mouth nightly. Per patient provided note, takes 2.5mg (1/2 tablet) nightly    memantine ER (NAMENDA XR) 21 mg capsule Take 21 mg by mouth daily.  folic acid/multivit-min/lutein (CENTRUM SILVER PO) Take 1 Tab by mouth daily.  allopurinol (ZYLOPRIM) 100 mg tablet Take 100 mg by mouth daily.  atorvastatin (LIPITOR) 20 mg tablet Take 20 mg by mouth daily.  loratadine (CLARITIN) 10 mg tablet Take 10 mg by mouth daily as needed for Allergies.  clopidogrel (PLAVIX) 75 mg tab Take 75 mg by mouth daily.  ferrous sulfate 325 mg (65 mg iron) tablet Take 325 mg by mouth Daily (before breakfast).  donepezil (ARICEPT) 10 mg tablet Take 10 mg by mouth nightly.  carvedilol (COREG) 12.5 mg tablet TAKE 1 TABLET TWICE A DAY WITH MEALS    furosemide (LASIX) 40 mg tablet Take 1 Tab by mouth daily.     felodipine (PLENDIL SR) 10 mg 24 hr tablet Take 1 Tab by mouth daily.  aspirin delayed-release 81 mg tablet Take 81 mg by mouth daily. No current facility-administered medications for this visit. Allergies and Sensitivities:  No Known Allergies    Family History:  No family history on file. Social History:  She  reports that she has never smoked. She does not have any smokeless tobacco history on file. She  reports that she does not drink alcohol. Physical:  Visit Vitals  /46   Pulse (!) 58   Ht 4' 11\" (1.499 m)   Wt 52.6 kg (116 lb)   SpO2 98%   BMI 23.43 kg/m²         Exam:  Neck:  Supple, no JVD, no carotid bruits  CV:  Normal S1 and  S2, no murmurs, rubs, or gallops noted  Lungs:  Clear to ausculation throughout, no wheezes or rales  Abd:  Soft, non-tender, non-distended with good bowel sounds. No hepatosplenomegaly  Extremities:  No edema      Data:  EKG:  Read by Virginia Lilly MD - Sinus bradycardia.  RBBB and right axis - possible right ventricular hypertrophy.  Consider pulmonary disease.  T-abnormality.  Possible anterolateral and inferior ischemia       LABS:  Lab Results   Component Value Date/Time    Sodium 141 06/10/2019 02:34 AM    Potassium 3.8 06/10/2019 02:34 AM    Chloride 105 06/10/2019 02:34 AM    CO2 29 06/10/2019 02:34 AM    Glucose 154 (H) 06/10/2019 02:34 AM    BUN 45 (H) 06/10/2019 02:34 AM    Creatinine 1.85 (H) 06/10/2019 02:34 AM     Lab Results   Component Value Date/Time    Cholesterol, total 123 06/06/2019 05:38 AM    HDL Cholesterol 62 (H) 06/06/2019 05:38 AM    LDL, calculated 44.8 06/06/2019 05:38 AM    Triglyceride 81 06/06/2019 05:38 AM    CHOL/HDL Ratio 2.0 06/06/2019 05:38 AM     Lab Results   Component Value Date/Time    ALT (SGPT) 51 06/06/2019 05:38 AM         Impression/Plan:  1. Diastolic heart failure, appears compensated  2. Hypertension, blood pressure controlled  3. History of CVA  4. Chronic kidney disease, stage 3  5.   Alzheimer's dementia  6. History of TIA  7. DNR    Ms. Kirk Kumari was seen today for a post-hospital follow-up. According to her caregiver and her son, she has been doing quite well since being discharged from the hospital.  They state that they are adhering to dietary sodium and fluid restrictions. She has a good appetite and has been eating well. They state that she has not offered any complaints of shortness of breath and they have not noticed any lower extremity edema. They have not been weighing her every day but they will begin doing so starting tomorrow morning. She is compliant with her medication regimen as her caregiver administers the medications to her. Her blood pressure and heart rate are well controlled. Her breath sounds are clear and she does not exhibit any signs of volume overload. Her son states that they are going to schedule her for follow-up with her primary care physician. Congestive heart failure teaching reinforced today. Advised to limit sodium intake to no more than 2000mg per day and to also limit fluid intake to 48 ounces per day (unless otherwise specified). Advised to weigh daily every morning and record weights. Instructed to call our office if progressive weight gain is noted over a 2 to 3 day period of time, shortness of breath increases, or if abdominal bloating, nausea, fatigue, or increased lower extremity edema is noted. Patient education material re:  CHF, low sodium diet, and sodium/fluid restriction attached to her after visit summary. Greater than 50% of a 40 minute visit was spent in discussion, counseling, and answering questions. She has been scheduled to return for follow-up with Dr. Mando Garcia and then as needed. Bradley Richmond MSN, FNP-BC    Please note:  Portions of this chart were created with Dragon medical speech to text program.  Unrecognized errors may be present.

## 2019-06-12 NOTE — PROGRESS NOTES
Transitional Care Team: ALESSANDRA Post Discharge Review    Date: 06/12/19  Time:  8:42 AM  Hospital Nurse Navigator: Nunu Mclean MSN, RN, [facility]-BC    Feliciano Cornejo is a 80 y.o. [facility] Primary Diagnosis  CHF exacerbation, proBNP 25,638, EF 55%, elevated troponin; UTI- discharged on cipro     Review and Plan as below:  1. no scale; family will purchase one; recommended daily weights and notified of weight gain parameters to report to cardiologist    2. Seen by palliative team during admission- POST form completed. DDNR and ACP documents were scanned in this admission as well. 3. Cardiac Diet- needs encouragement to follow. ST recommends Mech. Soft solids, thin liquids; meds. crushed or whole in puree; note she has a few sustained release  and/or extended release medications that cannot be crushed- ambulatory NN notified to review with family. 4. Follow up appts.:  Future Appointments   Date Time Provider Lucero Noriega   7/12/2019  2:00 PM [address] [place] [address] Place, NP [address] [place] [address]   6/18/19 at 12:00 PM with PCP  -this NN asked ambulatory NN to see if he can get an earlier cardiology appt. for patient. This Nurse Navigator accessed the patient's chart to offer support and placement in the [address] [place] [address] Team bridges the gaps in care and education surrounding discharge from the acute care facility. The objective is to empower the patient and family in taking a proactive role in the task of preventing readmission within the first thirty days after discharge from the acute care setting. The Transitional Care Team is also involved in the efforts to reduce readmission to the acute care setting after stabilization and discharge from the acute care environment either to the skilled nursing facilities or community. EDUCATION / INTERVENTIONS OFFERED:      1.  Discharge medication list was reviewed for accuracy and potential interactions. 2. Advance Care Planning:  reviewed and current     BARRIERS IDENTIFIED:    Ida Wallace expressed the following barriers to her discharge:   None at this time; she has a supportive family and private duty aide (1 person) 7 days a week for 12 hours (9 AM- 9 PM). PLAN:  The patient was discharged home with HH : [facility] yesterday 6/11/19. The TC Team will follow the patient from a distance while inpatient as well as be available for further transition disposition as needed. The TC Team will continue to offer support 30- 90 days post discharge from the acute care setting. The appropriate TC Team members were notified.     Past Medical History:   Diagnosis Date    Anemia NEC     Anxiety     Cataract     bilat    [facility] ([facility] 1999    CRI (chronic renal insufficiency) 4/07    CVA (cerebral infarction) ([facility] 4/01    right facial droop    CVA (cerebral infarction) ([facility] 2/11    left post corona radiata    Dementia     Diverticulosis     Edema     GERD (gastroesophageal reflux disease)     Hypercholesterolemia     Hypertension     Hypomagnesemia 2/11    Lipoma of neck     right    Orthostatic hypotension     RBBB (right bundle branch block)     Syncope 6/08    TIA (transient ischemic attack) mid 1990's       Advance Care Planning 6/11/2019   Patient's [place] is: -   Confirm Advance Directive Yes, on file   Does the patient have other document types MOST/MOLST/POST/POLST;Do Not Resuscitate       Marisa ROSE, RN, [facility]-BC  Nurse Navigator  [phone]

## 2019-06-12 NOTE — DISCHARGE SUMMARY
[address] [place] [address] Dr DISCHARGE SUMMARY Name:  Lito Hernandes 
MR#:   [id number] :  [id number] ACCOUNT #:  [de-identified] ADMIT DATE:  2019 DISCHARGE DATE:  2019 PRIMARY CARE PHYSICIAN:  Raisa Fernando MD 
 
DISPOSITION:  Discharged to home with home healthcare. DISCHARGE CONDITION:  Stable. DISCHARGE DIAGNOSES: 
1. Acute diastolic heart failure, ejection fraction of 55%. 2.  Urinary tract infection. 3.  Mildly elevated troponin. 4.  Demand ischemia. 5.  Acute hypoxic respiratory failure, resolved now. 6.  Anemia. 7.  Hypertension. 8.  Coronary artery disease. 9.  Chronic kidney disease stage III. 10.  History of cerebrovascular accident. 11.  Acute debility. 12.  Moderate protein calorie malnutrition. 13.  Alzheimer's dementia. 15.  Advanced age. 15.  Do not resuscitate. DISCHARGE MEDICATIONS: 
1. Allopurinol 100 mg daily. 2.  Aspirin 81 mg daily. 3.  Lipitor 20 mg daily. 4.  Coreg 12.5 mg b.i.d. 
5.  Centrum Silver one tablet daily. 6.  Ciprofloxacin 500 mg b.i.d. for three more days. 7.  Claritin 10 mg as needed. 8.  Clonidine 0.2 mg b.i.d. 9.  Valium 2.5 mg at bedtime. 10.  Aricept 10 mg at bedtime. 11.  Felodipine 10 mg daily. 12.  Ferrous sulfate 325 mg daily. 13.  Lasix 40 mg daily. 14.  Namenda 21 mg daily. 15.  Plavix 75 mg daily. CONSULTATIONS IN THE HOSPITAL:  Consultation with Dr. Larissa Bustos, Cardiology. PROCEDURES:  None. MAJOR INVESTIGATIONS DURING THE HOSPITAL STAY:  The patient had an echocardiogram, which showed ejection fraction of 55%,inconclusive  left ventricular diastolic dysfunction. The patient also had a lower extremity PVLs, which showed no DVT. HOSPITAL COURSE:  This is a 80-year-old -American female who presented to the emergency room with shortness of breath. The patient in the emergency room was noted to have acute CHF exacerbation.   The patient was started on IV Lasix. Cardiology was consulted. The patient on admission was noted to have acute hypoxic respiratory failure requiring BIPAP. She was weaned off BiPAP quickly and started on oxygen supplementation. The patient diuresed well with IV Lasix after which she did not go back on the BiPAP. Cardiology saw the patient and recommended aggressive diuresis. Serial EKGs and cardiac enzymes were done and her cardiac enzymes were up. They maxed at 1.10. Cardiology thought that it was most likely demand ischemia and recommended medical treatment alone. The patient was already on max therapy with aspirin, Plavix, and statin. Echo was ordered, which showed normal ejection fraction. The patient with aggressive diuresis got more than 3.5 L of negative balance in the hospital.  She also had a low-grade fever. Pancultures were obtained. The patient was noted to have UTI. The patient has been treated appropriately. There was also a concern about pneumonia, but given her heart failure and no signs of pneumonia, it was thought that her fevers were mostly from the UTI. The patient in the hospital was seen and evaluated by Physical Therapy and Occupational Therapy who recommended SNF. I discussed with the patient's son in detail about SNF versus home and the patient's son stated he wanted to take her home. The patient would be more comfortable home. She has around-the-clock caregivers at home and he was more comfortable taking her home. The patient also was seen by Palliative Care. Advanced directives are being done. The patient is DNR. The patient is currently hemodynamically and medically stable for discharge. The patient will be discharged home with outpatient followup. For discharge instructions, followup appointments, and physical exam, please refer to the previous discharge summary. The patient is alert, awake, and oriented x1-2.   I discussed with the patient and also with her son at the bedside in detail about the discharge plan and followup appointments. Both of them completely understood and agreed with the plan. I answered all of their questions and concerns appropriately. I have advised the caregivers and the family about medication compliance and followup appointments. I advised them about using Lasix extra if she develops any fluid overload. The patient's son completely agreed with my plan. Zenaida Titus MD 
 
 
[id number] 
D:  06/11/2019 10:07 
T:  06/12/2019 8:52 JOB #:  [id number] CC:  DO Keke Casanova MD

## 2019-06-12 NOTE — PROGRESS NOTES
Heart Failure Follow Up Call NN contacted the patient by telephone to perform CHF Follow Up. Spoke to patient's son, Sara Bernal. Verified  and address as identifiers. Rajiv Duran son reports pt is doing well, but is very fatigued after hospital stay. Denies SOB, LLE. Daily Weight:  Amount:  Son states has not weighed yet, they will buy a scale. Zone:(Pt Reported)  green [place]  Attends follow-up appointments as directed. 19 Patient will attend all scheduled appointments through 19  Knowledge and adherence medication (ie. action, side effects, missed dose, etc.)   
  19 Pt will take all medications prescribed to be evaluated on each outreach through 19  Maintains daily weight.   
  19  Pt will weight themselves daily and log the results to be evaluated on each outreach through 19 Needs addressed from pathway:   
24-48 Hours- or initial contact Review/Verification: 
? Identified care team 
? Disposition: Home ? Reviewed DC Instructions, Education, and HF Zones ? Verified [address] in place. ? Evaluated DME needs; arrange home equipment: No needs at this time ? Reviewed Advanced care planning ? Labs/Diagnostics to follow per MD office ? Follow-up apts are arranged ? Identified transportation Med Rec ? Ace/Arb: none ? Diuretic: Lasix 40 mg qd ? Beta Blocker: Coreg 12.5 BID ? Potassium: none ? Anticoagulant: Plavix 75 mg every day, ASA 81 mg every day 
? Other: Clonidine 0.2 mg BID Baseline Labs available in ERM 
? BMP 
? BUN/Creat. ? Hgb/Hct ? WBC 
? NT Pro-BNP 
 
SHAY Documentation: 
? Goals ? Challenges/Plan ? Weight   
? Edema ? Symptoms Education Disease Management: 
? Reviewed Cardiac Low-sodium Diet ? Reviewed importance of Fluid restriction ? Comorbidity Management ? Reviewed importance of Daily Weights ? Advance medical directive status PCP/Specialist follow up: Future Appointments Date Time Provider Lucero Leann 6/13/2019  9:30 AM Jace Garcia Patient,  [facility]  
7/12/2019  2:00 PM Jace Garcia Patient,  [facility] Cardiologist consult while IP: yes Palliative consult while IP:    yes EF: 55% on 6/6/19 Type of HF:   HFpEF Cardiac Device present: none Disposition of patient:  Home, Home Health [facility] Services/Tele Monitoring:  Home Health/[facility] Social support: children Does patient have a Scale:    No  
 
Does patient have an Advance Directive:  reviewed and current Advance Directive scanned into patients chart:  yes Patient reminded that there are physicians on call 24 hours a day / 7 days a week (M-F 5pm to 8am and from Friday 5pm until Monday 8a for the weekend) should the patient have questions or concerns. Patient reminded to call 911 if situation is emergent or patient feels the situation is emergent. Pt verbalizes understanding.

## 2019-06-13 ENCOUNTER — PATIENT OUTREACH (OUTPATIENT)
Dept: CARDIOLOGY CLINIC | Age: 84
End: 2019-06-13

## 2019-06-13 ENCOUNTER — OFFICE VISIT (OUTPATIENT)
Dept: CARDIOLOGY CLINIC | Age: 84
End: 2019-06-13

## 2019-06-13 VITALS
BODY MASS INDEX: 23.39 KG/M2 | SYSTOLIC BLOOD PRESSURE: 140 MMHG | DIASTOLIC BLOOD PRESSURE: 46 MMHG | HEART RATE: 58 BPM | HEIGHT: 59 IN | OXYGEN SATURATION: 98 % | WEIGHT: 116 LBS

## 2019-06-13 DIAGNOSIS — E78.5 DYSLIPIDEMIA: ICD-10-CM

## 2019-06-13 DIAGNOSIS — I10 ESSENTIAL HYPERTENSION: Primary | ICD-10-CM

## 2019-06-13 DIAGNOSIS — I50.32 DIASTOLIC CHF, CHRONIC (HCC): ICD-10-CM

## 2019-06-13 LAB
BACTERIA SPEC CULT: NORMAL
BACTERIA SPEC CULT: NORMAL
SERVICE CMNT-IMP: NORMAL
SERVICE CMNT-IMP: NORMAL

## 2019-06-13 NOTE — PROGRESS NOTES
Patient attended appointments with her cardiologist's NP on M. Eric Lan, Nurse Navigator reviewed EMR and will follow up on next scheduled outreach.

## 2019-06-13 NOTE — PROGRESS NOTES
Luann Padron presents today for   Chief Complaint   Patient presents with   [id number] [address] [place] [birth year] - 6/11/19 - no cardiac complaints       Luann Padron preferred language for health care discussion is english/other. Is someone accompanying this pt? yes    Is the patient using any DME equipment during OV? yes    Depression Screening:  3 most recent PHQ Screens 6/13/2019   Little interest or pleasure in doing things Not at all   Feeling down, depressed, irritable, or hopeless Not at all   Total Score PHQ 2 0       Learning Assessment:  No flowsheet data found. Abuse Screening:  No flowsheet data found. Fall Risk  Fall Risk Assessment, last 12 mths 6/13/2019   Able to walk? No       Pt currently taking Anticoagulant therapy? yes    Coordination of Care:  1. Have you been to the ER, urgent care clinic since your last visit? Hospitalized since your last visit? yes    2. Have you seen or consulted any other health care providers outside of the [address] [place] [address] since your last visit? Include any pap smears or colon screening.  no

## 2019-06-13 NOTE — PATIENT INSTRUCTIONS
Continue present medication regimen  Follow-up with Dr. Sandy Guillermo as scheduled and as needed  Weigh daily and record  Limit sodium intake to 2000mg per day  Limit fluid intake to no more than  6, eight ounce glasses of any type of fluids per day (total of 48 ounces per day)  Call if you notice sudden or progressive weight gain (3-5 pounds in 2-3 days), increasing shortness of breath, abdominal bloating, increasing lower extremity edema, inability to lie flat or on your normal number of pillows, having to sit up to catch your breath, fatigue, increased somnolence (sleeping more), poor appetite         Heart Failure: Care Instructions  Your Care Instructions    Heart failure occurs when your heart does not pump as much blood as the body needs. Failure does not mean that the heart has stopped pumping but rather that it is not pumping as well as it should. Over time, this causes fluid buildup in your lungs and other parts of your body. Fluid buildup can cause shortness of breath, fatigue, swollen ankles, and other problems. By taking medicines regularly, reducing sodium (salt) in your diet, checking your weight every day, and making lifestyle changes, you can feel better and live longer. Follow-up care is a key part of your treatment and safety. Be sure to make and go to all appointments, and call your doctor if you are having problems. It's also a good idea to know your test results and keep a list of the medicines you take. How can you care for yourself at home? Medicines    · Be safe with medicines. Take your medicines exactly as prescribed.  Call your doctor if you think you are having a problem with your medicine.     · Do not take any vitamins, over-the-counter medicine, or herbal products without talking to your doctor first. Magdalene [place] not take ibuprofen (Advil or Motrin) and naproxen (Aleve) without talking to your doctor first. They could make your heart failure worse.     · You may be taking some of the following medicine. ? Beta-blockers can slow heart rate, decrease blood pressure, and improve your condition. Taking a beta-blocker may lower your chance of needing to be hospitalized. ? Angiotensin-converting enzyme inhibitors (ACEIs) reduce the heart's workload, lower blood pressure, and reduce swelling. Taking an ACEI may lower your chance of needing to be hospitalized again. ? Angiotensin II receptor blockers (ARBs) work like ACEIs. Your doctor may prescribe them instead of ACEIs. ? Diuretics, also called water pills, reduce swelling. ? Potassium supplements replace this important mineral, which is sometimes lost with diuretics. ? Aspirin and other blood thinners prevent blood clots, which can cause a stroke or heart attack.    You will get more details on the specific medicines your doctor prescribes. Diet    · Your doctor may suggest that you limit sodium to 2,000 milligrams (mg) a day or less. That is less than 1 teaspoon of salt a day, including all the salt you eat in cooking or in packaged foods. People get most of their sodium from processed foods. Fast food and restaurant meals also tend to be very high in sodium.     · Ask your doctor how much liquid you can drink each day. You may have to limit liquids.    Weight    · Weigh yourself without clothing at the same time each day. Record your weight. Call your doctor if you have a sudden weight gain, such as more than 2 to 3 pounds in a day or 5 pounds in a week. (Your doctor may suggest a different range of weight gain.) A sudden weight gain may mean that your heart failure is getting worse.    Activity level    · Start light exercise (if your doctor says it is okay). Even if you can only do a small amount, exercise will help you get stronger, have more energy, and manage your weight and your stress. Walking is an easy way to get exercise. Start out by walking a little more than you did before.  Bit by bit, increase the amount you walk.     · When you exercise, watch for signs that your heart is working too hard. You are pushing yourself too hard if you cannot talk while you are exercising. If you become short of breath or dizzy or have chest pain, stop, sit down, and rest.     · If you feel \"wiped out\" the day after you exercise, walk slower or for a shorter distance until you can work up to a better pace.     · Get enough rest at night. Sleeping with 1 or 2 pillows under your upper body and head may help you breathe easier.    Lifestyle changes    · Do not smoke. Smoking can make a heart condition worse. If you need help quitting, talk to your doctor about stop-smoking programs and medicines. These can increase your chances of quitting for good. Quitting smoking may be the most important step you can take to protect your heart.     · Limit alcohol to 2 drinks a day for men and 1 drink a day for women. Too much alcohol can cause health problems.     · Avoid getting sick from colds and the flu. Get a pneumococcal vaccine shot. If you have had one before, ask your doctor whether you need another dose. Get a flu shot each year. If you must be around people with colds or the flu, wash your hands often. When should you call for help? Call 911 if you have symptoms of sudden heart failure such as:    · You have severe trouble breathing.     · You cough up pink, foamy mucus.     · You have a new irregular or rapid heartbeat.    Call your doctor now or seek immediate medical care if:    · You have new or increased shortness of breath.     · You are dizzy or lightheaded, or you feel like you may faint.     · You have sudden weight gain, such as more than 2 to 3 pounds in a day or 5 pounds in a week.  (Your doctor may suggest a different range of weight gain.)     · You have increased swelling in your legs, ankles, or feet.     · You are suddenly so tired or weak that you cannot do your usual activities.    Watch closely for changes in your health, and be sure to contact your doctor if you develop new symptoms. Where can you learn more? Go to http://mónica-shirin.info/. Enter K036 in the search box to learn more about \"Heart Failure: Care Instructions. \"  Current as of: July 22, 2018  Content Version: 11.9  © 4229-2797 [employer]. Care instructions adapted under license by [employer] (which disclaims liability or warranty for this information). If you have questions about a medical condition or this instruction, always ask your healthcare professional. [place] [address] any warranty or liability for your use of this information. Limiting Sodium and Fluids With Heart Failure: Care Instructions  Your Care Instructions    Sodium causes your body to hold on to extra water. This may cause your heart failure symptoms to get worse. Limiting sodium may help you feel better and lower your risk of having to go to the hospital.  People get most of their sodium from processed foods. Fast food and restaurant meals also tend to be very high in sodium. Your doctor may suggest that you limit sodium to 2,000 milligrams (mg) a day or less. That is less than 1 teaspoon of salt a day, including all the salt you eat in cooked or packaged foods. Usually, you have to limit the amount of liquids you drink only if your heart failure is severe. Limiting sodium alone often is enough to help your body get rid of extra fluids. However, your doctor may tell you to limit your fluid intake to a set amount each day. Follow-up care is a key part of your treatment and safety. Be sure to make and go to all appointments, and call your doctor if you are having problems. It's also a good idea to know your test results and keep a list of the medicines you take. How can you care for yourself at home? Read food labels  · Read food labels on cans and food packages. The labels tell you how much sodium is in each serving.  Make sure that you look at the serving size. If you eat more than the serving size, you have eaten more sodium than is listed for one serving. · Food labels also tell you the Percent Daily Value. If the Percent Daily Value says 50%, it means that you will get at least 50% of all the sodium you need for the entire day in one serving. Choose products with low Percent Daily Values for sodium. · Be aware that sodium can come in forms other than salt, including monosodium glutamate (MSG), sodium citrate, and sodium bicarbonate (baking soda). MSG is often added to Asian food. You can sometimes ask for food without MSG or salt. Buy low-sodium foods  · Buy foods that are labeled \"unsalted\" (no salt added), \"sodium-free\" (less than 5 mg of sodium per serving), or \"low-sodium\" (less than 140 mg of sodium per serving). A food labeled \"light sodium\" has less than half of the full-sodium version of that food. Foods labeled \"reduced-sodium\" may still have too much sodium. · Buy fresh vegetables or plain, frozen vegetables. Buy low-sodium versions of canned vegetables, soups, and other canned goods. Prepare low-sodium meals  · Use less salt each day when cooking. Reducing salt in this way will help you adjust to the taste. Do not add salt after cooking. Take the salt shaker off the table. · Flavor your food with garlic, lemon juice, onion, vinegar, herbs, and spices instead of salt. Do not use soy sauce, steak sauce, onion salt, garlic salt, mustard, or ketchup on your food. · Make your own salad dressings, sauces, and ketchup without adding salt. · Use less salt (or none) when recipes call for it. You can often use half the salt a recipe calls for without losing flavor. Other dishes like rice, pasta, and grains do not need added salt. · Rinse canned vegetables. This removes some--but not all--of the salt. · Avoid water that has a naturally high sodium content or that has been treated with water softeners, which add sodium.  Call your local water company to find out the sodium content of your water supply. If you buy bottled water, read the label and choose a sodium-free brand. Avoid high-sodium foods, such as:  · Smoked, cured, salted, and canned meat, fish, and poultry. · Ham, yañez, hot dogs, and luncheon meats. · Regular, hard, and processed cheese and regular peanut butter. · Crackers with salted tops. · Frozen prepared meals. · Canned and dried soups, broths, and bouillon, unless labeled sodium-free or low-sodium. · Canned vegetables, unless labeled sodium-free or low-sodium. · Salted snack foods such as chips and pretzels. · Western Karlee fries, pizza, tacos, and other fast foods. · Pickles, olives, ketchup, and other condiments, especially soy sauce, unless labeled sodium-free or low-sodium. If you cannot cook for yourself  · Have family members or friends help you, or have someone cook low-sodium meals. · Check with your local senior nutrition program to find out where meals are served and whether they offer a low-sodium option. You can often find these programs through your local health department or hospital.  · Have meals delivered to your home. Most [facility] have a Meals on Managed by Q. These programs provide one hot meal a day for older adults, delivered to their homes. Ask whether these meals are low-sodium. Let them know that you are on a low-sodium diet. Limiting fluid intake  · Find a method that works for you. You might simply write down how much you drink every time you do. Some people keep a container filled with the amount of fluid allowed for that day. If they drink from a source other than the container, then they pour out that amount. · Measure your regular drinking glasses to find out how much fluid each one holds. Once you know this, you will not have to measure every time. · Besides water, milk, juices, and other drinks, some foods have a lot of fluid.  Count any foods that will melt (such as ice cream or gelatin dessert) or liquid foods (such as soup) as part of your fluid intake for the day. Where can you learn more? Go to http://mónica-shirin.info/. Enter A166 in the search box to learn more about \"Limiting Sodium and Fluids With Heart Failure: Care Instructions. \"  Current as of: July 22, 2018  Content Version: 11.9  © 2432-6598 [employer]. Care instructions adapted under license by [employer] (which disclaims liability or warranty for this information). If you have questions about a medical condition or this instruction, always ask your healthcare professional. [place] [address] any warranty or liability for your use of this information. Low Sodium Diet (2,000 Milligram): Care Instructions  Your Care Instructions    Too much sodium causes your body to hold on to extra water. This can raise your blood pressure and force your heart and kidneys to work harder. In very serious cases, this could cause you to be put in the hospital. It might even be life-threatening. By limiting sodium, you will feel better and lower your risk of serious problems. The most common source of sodium is salt. People get most of the salt in their diet from canned, prepared, and packaged foods. Fast food and restaurant meals also are very high in sodium. Your doctor will probably limit your sodium to less than 2,000 milligrams (mg) a day. This limit counts all the sodium in prepared and packaged foods and any salt you add to your food. Follow-up care is a key part of your treatment and safety. Be sure to make and go to all appointments, and call your doctor if you are having problems. It's also a good idea to know your test results and keep a list of the medicines you take. How can you care for yourself at home? Read food labels  · Read labels on cans and food packages. The labels tell you how much sodium is in each serving. Make sure that you look at the serving size.  If you eat more than the serving size, you have eaten more sodium. · Food labels also tell you the Percent Daily Value for sodium. Choose products with low Percent Daily Values for sodium. · Be aware that sodium can come in forms other than salt, including monosodium glutamate (MSG), sodium citrate, and sodium bicarbonate (baking soda). MSG is often added to Asian food. When you eat out, you can sometimes ask for food without MSG or added salt. Buy low-sodium foods  · Buy foods that are labeled \"unsalted\" (no salt added), \"sodium-free\" (less than 5 mg of sodium per serving), or \"low-sodium\" (less than 140 mg of sodium per serving). Foods labeled \"reduced-sodium\" and \"light sodium\" may still have too much sodium. Be sure to read the label to see how much sodium you are getting. · Buy fresh vegetables, or frozen vegetables without added sauces. Buy low-sodium versions of canned vegetables, soups, and other canned goods. Prepare low-sodium meals  · Cut back on the amount of salt you use in cooking. This will help you adjust to the taste. Do not add salt after cooking. One teaspoon of salt has about 2,300 mg of sodium. · Take the salt shaker off the table. · Flavor your food with garlic, lemon juice, onion, vinegar, herbs, and spices. Do not use soy sauce, lite soy sauce, steak sauce, onion salt, garlic salt, celery salt, mustard, or ketchup on your food. · Use low-sodium salad dressings, sauces, and ketchup. Or make your own salad dressings and sauces without adding salt. · Use less salt (or none) when recipes call for it. You can often use half the salt a recipe calls for without losing flavor. Other foods such as rice, pasta, and grains do not need added salt. · Rinse canned vegetables, and cook them in fresh water. This removes some--but not all--of the salt. · Avoid water that is naturally high in sodium or that has been treated with water softeners, which add sodium.  Call your local water company to find out the sodium content of your water supply. If you buy bottled water, read the label and choose a sodium-free brand. Avoid high-sodium foods  · Avoid eating:  ? Smoked, cured, salted, and canned meat, fish, and poultry. ? Ham, yañez, hot dogs, and luncheon meats. ? Regular, hard, and processed cheese and regular peanut butter. ? Crackers with salted tops, and other salted snack foods such as pretzels, chips, and salted popcorn. ? Frozen prepared meals, unless labeled low-sodium. ? Canned and dried soups, broths, and bouillon, unless labeled sodium-free or low-sodium. ? Canned vegetables, unless labeled sodium-free or low-sodium. ? Western Karlee fries, pizza, tacos, and other fast foods. ? Pickles, olives, ketchup, and other condiments, especially soy sauce, unless labeled sodium-free or low-sodium. Where can you learn more? Go to http://mónica-shirin.info/. Enter I057 in the search box to learn more about \"Low Sodium Diet (2,000 Milligram): Care Instructions. \"  Current as of: March 28, 2018  Content Version: 11.9  © 8747-2055 [employer]. Care instructions adapted under license by [employer] (which disclaims liability or warranty for this information). If you have questions about a medical condition or this instruction, always ask your healthcare professional. [place] [address] any warranty or liability for your use of this information.

## 2019-06-25 NOTE — PROGRESS NOTES
NN contacted the patient by telephone to perform CHF follow Up. Spoke to pt's caretaker, Emiliano Franklin. Noted Priorities/Plan:  Daily weights, return to baseline Daily Weight: Pt wt on last outreach was 119.4 lbs on 6/20/19. Pt weighs 119 lbs today. Zone: green Signs/Symptoms: Edema none; SOB none; orthopnea none. Per caretaker, no issues or sx have been noted and patient is doing well. Goals  Attends follow-up appointments as directed. 06/12/19 Patient will attend all scheduled appointments through 09/12/19  Knowledge and adherence medication (ie. action, side effects, missed dose, etc.)   
  06/12/19 Pt will take all medications prescribed to be evaluated on each outreach through 09/12/19  Maintains daily weight.   
  06/12/19  Pt will weight themselves daily and log the results to be evaluated on each outreach through 09/12/19 Needs addressed from pathway:  
Week 1-4 Provide Daily Disease Management 
(NN initiated) ? Reviewed importance of Daily weight (Before Breakfast- 
Reviewed Daily Zone Identification (symptom management; weight, edema, SOB, activity/sleep changes)-notify provider immediately as indicated ? Reviewed Cardiac Low-sodium Diet and include carbohydrate controlled diet education ? Reviewed importance of Fluid restriction ? Comorbidity Management ? Confirmed follow-up appointments/transportation. Additional assessment ? Reinforced Fall precautions ? Activity tolerance assessment: at baseline ? Reviewed Energy conservation management and balancing activity with rest 
? Labs/diagnostics per MD office ? Medication Therapy: no issues identified ? Diet/appetite assessment: care taker states no issues ? Reviewed appropriate ED/Hospital utilization ? Immunizations not up to date ? Home assessment/modifications: no needs identified ? Pt denies transportation assistance needs ? Pt denies medication assistance needs ? Home health services are in place Psychosocial: Reassurance/emotional support Monitoring: ? Home health ? My Chart Education: 
? Reviewed Advanced care planning status ? Support system identification ( eg: Caregiver, meal planning, community resources, family, friends, Holiness, support group) ? Health literacy for heart failure ? Caregiver education and preparation Have you been to an ER/Hospital since discharge from [facility]? No   
 
Have you followed up with PCP/Cardiologist/Specialist?  
19 Card appt w/ RUSLAN Mccartney Amend 19 PCP appt w/ SJUATHA Martinez - decreased valium to 2.5 mg qhs, home med list updated [facility]19 Card appt w/ Dr Fabián Aranda Transportation:  family/caretakers Diet: no salt/cardiac Activity/ADLs: self w/ assistance. Medications Reconciled at this time: yes Home health:  Company/Completion: [employer] Social Support: family/caretakers Advanced Care Plan: ACP and DDNR on file. Patient reminded that there is a physician on call 24 hours a day / 7 days a week (M-F 5pm to 8am and from Friday 5pm until Monday 8a for the weekend) should the patient have questions or concerns. Patient reminded to call 911 if situation is emergent or patient feels the situation is emergent. Pt verbalizes understanding.

## 2019-07-02 NOTE — PROGRESS NOTES
NN contacted the patient by telephone to perform CHF follow Up. Spoke to pt's caretaker, Prakash Livingston. Noted Priorities/Plan:  Daily weights, return to baseline Daily Weight: Pt wt on last outreach was 119 lbs on 6/25/19. Pt weighs 118 lbs today. Zone: green Signs/Symptoms: Edema none; SOB none; orthopnea none. Per caretaker, no issues or sx have been noted and patient is doing well. Goals  Attends follow-up appointments as directed. 06/12/19 Patient will attend all scheduled appointments through 09/12/19  Knowledge and adherence medication (ie. action, side effects, missed dose, etc.)   
  06/12/19 Pt will take all medications prescribed to be evaluated on each outreach through 09/12/19  Maintains daily weight.   
  06/12/19  Pt will weight themselves daily and log the results to be evaluated on each outreach through 09/12/19 Needs addressed from pathway:  
Week 1-4 Provide Daily Disease Management 
(NN initiated) ? Reviewed importance of Daily weight (Before Breakfast- 
Reviewed Daily Zone Identification (symptom management; weight, edema, SOB, activity/sleep changes)-notify provider immediately as indicated ? Reviewed Cardiac Low-sodium Diet and include carbohydrate controlled diet education ? Reviewed importance of Fluid restriction ? Comorbidity Management ? Confirmed follow-up appointments/transportation. Additional assessment ? Reinforced Fall precautions ? Activity tolerance assessment: at baseline ? Reviewed Energy conservation management and balancing activity with rest 
? Labs/diagnostics per MD office ? Medication Therapy: no issues identified ? Diet/appetite assessment: care taker states no issues ? Reviewed appropriate ED/Hospital utilization ? Immunizations not up to date ? Home assessment/modifications: no needs identified ? Pt denies transportation assistance needs ? Pt denies medication assistance needs ? Home health services are in place Psychosocial: Reassurance/emotional support Monitoring: ? Home health ? My Chart Education: 
? Reviewed Advanced care planning status ? Support system identification ( eg: Caregiver, meal planning, community resources, family, friends, Denominational, support group) ? Health literacy for heart failure ? Caregiver education and preparation Have you been to an ER/Hospital since discharge from [facility]? No   
 
Have you followed up with PCP/Cardiologist/Specialist?  
19 Card appt w/ VICK. Iron Meth 19 PCP appt w/ SUJATHA Alicia - decreased valium to 2.5 mg qhs, home med list updated Upcomin19 Card appt w/ Dr Sarah Holbrook Transportation:  family/caretakers Diet: no salt/cardiac Activity/ADLs: self w/ assistance. Medications Reconciled at this time: yes Home health:  Company/Completion: [employer] Social Support: family/caretakers Advanced Care Plan: ACP and DDNR on file. Patient reminded that there is a physician on call 24 hours a day / 7 days a week (M-F 5pm to 8am and from Friday 5pm until Monday 8a for the weekend) should the patient have questions or concerns. Patient reminded to call 911 if situation is emergent or patient feels the situation is emergent. Pt verbalizes understanding.

## 2019-07-11 NOTE — PROGRESS NOTES
NN contacted the patient by telephone to perform CHF follow Up. Spoke to pt's caretaker, Trudi Mora. Noted Priorities/Plan:  Daily weights, return to baseline Daily Weight: Pt wt on last outreach was 118 lbs on 7/2/19. Pt weighs 120 lbs today. Zone: green Signs/Symptoms: Edema none; SOB none; orthopnea none. Per caretaker, no issues or sx have been noted and patient is doing well. Goals  Attends follow-up appointments as directed. 06/12/19 Patient will attend all scheduled appointments through 09/12/19  Knowledge and adherence medication (ie. action, side effects, missed dose, etc.)   
  06/12/19 Pt will take all medications prescribed to be evaluated on each outreach through 09/12/19  Maintains daily weight.   
  06/12/19  Pt will weight themselves daily and log the results to be evaluated on each outreach through 09/12/19 Needs addressed from pathway:  
Week 5-8 Provide Daily Disease Management (patient/caregiver initiated) ? Reviewed and reinforced importance of Daily weight (Before Breakfast 
? Reviewed Daily Zone Identification (symptom management; weight, edema, SOB, activity/sleep changes)-notify provider immediately as indicated ? Cardiac Low-sodium Diet (No added sodium; 1500mg as indicated) ? Reviewed importance of Fluid restriction ? Comorbidity Management ? Confirmed follow-up appointments/transportation. Additional Assessments ? Reinforced Fall precautions ? Activity tolerance assessment: caretaker reports pt at baseline ? Reviewed Energy conservation management and balancing activity w/ rest 
? Labs/diagnostics per MD office ? Medication Therapy: no issues identified ? Diet/appetite assessment: no issues noted ? Reviewed appropriate ED/Hospital utilization ? Home re-assessment/modifications: no needs identified Psychosocial: Reassurance and emotional support; depression screening. Monitoring: ? Home health ? My Chart Education: 
? Advanced Care Planning status reviewed ? Patient/Caregiver verifies support systems (meal planning, medication and transportation needs, community resources) ? Health literacy for heart failure Have you been to an ER/Hospital since discharge from [facility]? No   
 
Have you followed up with PCP/Cardiologist/Specialist?  
19 Card appt w/ RUSLAN Baca 19 PCP appt w/ Dr Evie Riley, S. - decreased valium to 2.5 mg qhs, home med list updated [facility]19 Card appt w/ Dr Angel Rivas Transportation:  family/caretakers Diet: no salt/cardiac Activity/ADLs: self w/ assistance. Medications Reconciled at this time: no change since last outreach Home health:  Company/Completion: [employer] Social Support: family/caretakers Advanced Care Plan: ACP and DDNR on file. Patient reminded that there is a physician on call 24 hours a day / 7 days a week (M-F 5pm to 8am and from Friday 5pm until Monday 8a for the weekend) should the patient have questions or concerns. Patient reminded to call 911 if situation is emergent or patient feels the situation is emergent. Pt verbalizes understanding.

## 2019-07-22 NOTE — PROGRESS NOTES
NN contacted the patient by telephone to perform CHF follow Up. Spoke to pt's caretaker, Yanci Desai. Noted Priorities/Plan:  Daily weights, return to baseline     Daily Weight: Pt wt on last outreach was 120 lbs on 7/2/19. Pt weighs 122 lbs today. Zone: green     Signs/Symptoms: Edema none; SOB none; orthopnea none. Per caretaker, no issues or sx have been noted and patient is doing well. Goals      Attends follow-up appointments as directed. 06/12/19 Patient will attend all scheduled appointments through 09/12/19       Knowledge and adherence medication (ie. action, side effects, missed dose, etc.)      06/12/19 Pt will take all medications prescribed to be evaluated on each outreach through 09/12/19       Maintains daily weight.      06/12/19  Pt will weight themselves daily and log the results to be evaluated on each outreach through 09/12/19        Needs addressed from pathway:   Week 5-8   Provide Daily Disease Management (patient/caregiver initiated)  ? Reviewed and reinforced importance of Daily weight (Before Breakfast  ? Reviewed Daily Zone Identification (symptom management; weight, edema, SOB, activity/sleep changes)-notify provider immediately as indicated  ? Cardiac Low-sodium Diet (No added sodium; 1500mg as indicated)  ? Reviewed importance of Fluid restriction  ? Comorbidity Management  ? Confirmed follow-up appointments/transportation. Additional Assessments  ? Reinforced Fall precautions    ? Activity tolerance assessment: caretaker reports pt at baseline  ? Reviewed Energy conservation management and balancing activity w/ rest  ? Labs/diagnostics per MD office  ? Medication Therapy: no issues identified  ? Diet/appetite assessment: no issues noted  ? Reviewed appropriate ED/Hospital utilization  ? Home re-assessment/modifications: no needs identified    Psychosocial: Reassurance and emotional support; depression screening. Monitoring:  ? Home health  ?  My Chart    Education:  ? Advanced Care Planning status reviewed  ? Patient/Caregiver verifies support systems (meal planning, medication and transportation needs, community resources)  ? Health literacy for heart failure         Have you been to an ER/Hospital since discharge from [facility]? No      Have you followed up with PCP/Cardiologist/Specialist?   19 Card appt w/ RUSLAN Harmon  19 PCP appt w/ Dr Viktoriya Barrett. - decreased valium to 2.5 mg qhs, home med list updated    [facility]19 Card appt w/ Dr Ballard [facility]    Transportation:  family/caretakers  Diet: no salt/cardiac  Activity/ADLs: self w/ assistance. Medications Reconciled at this time: no change since last outreach  Home health:  Company/Completion: [employer]  Social Support: family/caretakers    Advanced Care Plan: ACP and DDNR on file. Patient reminded that there is a physician on call 24 hours a day / 7 days a week (M-F 5pm to 8am and from Friday 5pm until Monday 8a for the weekend) should the patient have questions or concerns. Patient reminded to call 911 if situation is emergent or patient feels the situation is emergent. Pt verbalizes understanding.

## 2019-07-30 NOTE — PROGRESS NOTES
NN contacted the patient by telephone to perform CHF follow Up. Spoke to pt's caretaker, Trudi Mora. Noted Priorities/Plan:  Daily weights, return to baseline     Daily Weight: Pt wt on last outreach was 122 lbs on 7/22/19. Pt weighs 124 lbs today. Zone: green     Signs/Symptoms: Edema none; SOB none; orthopnea none. Per caretaker, no issues or sx have been noted and patient is doing well. Goals      Attends follow-up appointments as directed. 06/12/19 Patient will attend all scheduled appointments through 09/12/19       Knowledge and adherence medication (ie. action, side effects, missed dose, etc.)      06/12/19 Pt will take all medications prescribed to be evaluated on each outreach through 09/12/19       Maintains daily weight.      06/12/19  Pt will weight themselves daily and log the results to be evaluated on each outreach through 09/12/19        Needs addressed from pathway:   Week 5-8   Provide Daily Disease Management (patient/caregiver initiated)  ? Reviewed and reinforced importance of Daily weight (Before Breakfast  ? Reviewed Daily Zone Identification (symptom management; weight, edema, SOB, activity/sleep changes)-notify provider immediately as indicated  ? Cardiac Low-sodium Diet (No added sodium; 1500mg as indicated)  ? Reviewed importance of Fluid restriction  ? Comorbidity Management  ? Confirmed follow-up appointments/transportation. Additional Assessments  ? Reinforced Fall precautions    ? Activity tolerance assessment: caretaker reports pt at baseline  ? Reviewed Energy conservation management and balancing activity w/ rest  ? Labs/diagnostics per MD office  ? Medication Therapy: no issues identified  ? Diet/appetite assessment: no issues noted  ? Reviewed appropriate ED/Hospital utilization  ? Home re-assessment/modifications: no needs identified    Psychosocial: Reassurance and emotional support; depression screening. Monitoring:  ? Home health  ?  My Chart    Education:  ? Advanced Care Planning status reviewed  ? Patient/Caregiver verifies support systems (meal planning, medication and transportation needs, community resources)  ? Health literacy for heart failure         Have you been to an ER/Hospital since discharge from [facility]? No      Have you followed up with PCP/Cardiologist/Specialist?   19 Card appt w/ RUSLAN Harmon  19 PCP appt w/ Dr Tyler Knight. - decreased valium to 2.5 mg qhs, home med list updated    [facility]19 Card appt w/ Dr Jessica Soto    Transportation:  family/caretakers  Diet: no salt/cardiac  Activity/ADLs: self w/ assistance. Medications Reconciled at this time: no change since last outreach  Home health:  Company/Completion: [employer]  Social Support: family/caretakers    Advanced Care Plan: ACP and DDNR on file. Patient reminded that there is a physician on call 24 hours a day / 7 days a week (M- 5pm to 8am and from Friday 5pm until Monday 8a for the weekend) should the patient have questions or concerns. Patient reminded to call 911 if situation is emergent or patient feels the situation is emergent. Pt verbalizes understanding.

## 2019-08-13 NOTE — PROGRESS NOTES
NN contacted the patient by telephone to perform CHF follow Up. Spoke to pt's caretaker, Anatoliy Preciado. Noted Priorities/Plan:  Daily weights, remaining at baseline Daily Weight: Pt wt on last outreach was 124 lbs on 8/6/19. Pt weighs 122 lbs today. Zone: green Signs/Symptoms: Edema none; SOB none; orthopnea none. Per caretaker, no issues or sx have been noted and patient is doing well. States pt w/ change to bp med recently and appears pt w/ more lethargy. Will watch closely. Goals  Attends follow-up appointments as directed. 06/12/19 Patient will attend all scheduled appointments through 09/12/19  Knowledge and adherence medication (ie. action, side effects, missed dose, etc.)   
  06/12/19 Pt will take all medications prescribed to be evaluated on each outreach through 09/12/19  Maintains daily weight.   
  06/12/19  Pt will weight themselves daily and log the results to be evaluated on each outreach through 09/12/19 Needs addressed from pathway:  
Week 5-8 Provide Daily Disease Management (patient/caregiver initiated) ? Reviewed and reinforced importance of Daily weight (Before Breakfast 
? Reviewed Daily Zone Identification (symptom management; weight, edema, SOB, activity/sleep changes)-notify provider immediately as indicated ? Cardiac Low-sodium Diet (No added sodium; 1500mg as indicated) ? Reviewed importance of Fluid restriction ? Comorbidity Management ? Confirmed follow-up appointments/transportation. Additional Assessments ? Reinforced Fall precautions ? Activity tolerance assessment: caretaker reports pt at baseline ? Reviewed Energy conservation management and balancing activity w/ rest 
? Labs/diagnostics per MD office ? Medication Therapy: no issues identified ? Diet/appetite assessment: no issues noted ? Reviewed appropriate ED/Hospital utilization ? Home re-assessment/modifications: no needs identified Psychosocial: Reassurance and emotional support; depression screening. Monitoring: ? Home health ? My Chart Education: 
? Advanced Care Planning status reviewed ? Patient/Caregiver verifies support systems (meal planning, medication and transportation needs, community resources) ? Health literacy for heart failure Have you been to an ER/Hospital since discharge from [facility]? No   
 
Have you followed up with PCP/Cardiologist/Specialist?  
19 Card appt w/ RUSLAN Villalba 19 PCP appt w/ NASREEN Tejada. - decreased valium to 2.5 mg qhs, home med list updated [facility]19 Card appt w/ Dr Yamel Mayorga Transportation:  family/caretakers Diet: no salt/cardiac Activity/ADLs: self w/ assistance. Medications Reconciled at this time: clonidine changed to TID. Home med list updated. Home health:  Company/Completion: [employer] Social Support: family/caretakers Advanced Care Plan: ACP and DDNR on file. Patient reminded that there is a physician on call 24 hours a day / 7 days a week (M-F 5pm to 8am and from Friday 5pm until Monday 8a for the weekend) should the patient have questions or concerns. Patient reminded to call 911 if situation is emergent or patient feels the situation is emergent. Pt verbalizes understanding.

## 2019-08-22 NOTE — PROGRESS NOTES
Patient attended appointments with her cardiologist JESUS Pagan On 8/22/19, Nurse Navigator reviewed EMR and will follow up on next scheduled outreach.

## 2019-08-22 NOTE — PROGRESS NOTES
Bailey Lay presents today for   Chief Complaint   Patient presents with   [facility] Follow Up    Shortness of Breath     with exertion     Leg Swelling       Bailey Lay preferred language for health care discussion is english/other. Is someone accompanying this pt? yes    Is the patient using any DME equipment during OV? yes    Depression Screening:  3 most recent PHQ Screens 6/13/2019   Little interest or pleasure in doing things Not at all   Feeling down, depressed, irritable, or hopeless Not at all   Total Score PHQ 2 0       Learning Assessment:  No flowsheet data found. Abuse Screening:  No flowsheet data found. Fall Risk  Fall Risk Assessment, last 12 mths 8/22/2019   Able to walk? Yes   Fall in past 12 months? No       Pt currently taking Anticoagulant therapy? yes    Coordination of Care:  1. Have you been to the ER, urgent care clinic since your last visit? Hospitalized since your last visit? yes    2. Have you seen or consulted any other health care providers outside of the [address] [place] [address] since your last visit? Include any pap smears or colon screening.  no

## 2019-08-22 NOTE — PROGRESS NOTES
Yessenia Lawson    Chief Complaint   Patient presents with   [facility] Follow Up    Shortness of Breath     with exertion     Leg Swelling       HPI    Yessenia Lawson is a 80 y.o. AAF with HFpEF, HTN, H/o TIA/ CVA, CKD3 here for routine follow up and to establish her cardiovascular care. She was hospitalized from 6/5/19 through 6/11/19 for acute diastolic heart failure and acute hypoxic respiratory failure requiring Bipap. She presented with complaints of shortness of breath. Her NT pro-BNP was 25,638. She was given IV lasix and her symptoms improved. Her troponins were mildly elevated and peaked at 1.10 and was felt to be due to demand ischemia. After being diuresed, she had 3.5 liters negative balance. She had an echo done and it showed an EF of 55%, moderate TR, and PASP of 52 mmHg. She also had a left pleural effusion. Lower extremity duplex study was negative for DVT.       She is a DNR. She was not followed by cardiology (in office) prior to this appointment although cardiology has seen her during her other hospitalizations. She was last seen by our NP- and was doing reasonably well/ well compensated from a CV standpoint.     She was accompanied to the office today by her son and a caregiver. They provide a majority of her history. Ms. Shantel Joseph is able to answer some questions appropriately. Her caregiver states that she has been doing well at home. She has a good appetite and they ensure that she is taking her medications. They have not noticed any shortness of breath or lower extremity edema. Her caregiver states that they are adhering to dietary sodium and fluid restrictions as recommended during her hospitalization.     Denies chest pain, tightness, heaviness, and palpitations. Denies shortness of breath at rest, dyspnea on exertion, orthopnea and PND. Denies abdominal bloating. Denies lightheadedness, dizziness, and syncope. Denies lower extremity edema and claudication. Denies nausea, vomiting, diarrhea, melena, hematochezia. Denies hematuria, urgency, frequency. Denies fever, chills. She is hard of hearing. She has a great appetite and tries to stay busy- she folds her own clothes. Past Medical History:   Diagnosis Date    Anemia NEC     Anxiety     Cataract     bilat    Colon cancer ([facility] [address] 1999    CRI (chronic renal insufficiency) 4/07    CVA (cerebral infarction) 4/01    right facial droop    CVA (cerebral infarction) 2/11    left post corona radiata    Dementia     Diverticulosis     Edema     GERD (gastroesophageal reflux disease)     Hypercholesterolemia     Hypertension     Hypomagnesemia 2/11    Lipoma of neck     right    Orthostatic hypotension     RBBB (right bundle branch block)     Syncope 6/08    TIA (transient ischemic attack) mid 1990's       Past Surgical History:   Procedure Laterality Date    ENDOSCOPY, COLON, DIAGNOSTIC  2006    HX BREAST BIOPSY      HX CATARACT REMOVAL  10/02    left    HX CATARACT REMOVAL  6/03    right    HX COLECTOMY  1999    right hemicolectomy    HX HYSTERECTOMY      HX LIPOMA RESECTION         Current Outpatient Medications   Medication Sig Dispense Refill    ferrous sulfate (SLOW FE) 142 mg (45 mg iron) ER tablet Take  by mouth Daily (before breakfast).  diazePAM (VALIUM) 5 mg tablet Take 2.5 mg by mouth nightly.  cloNIDine HCl (CATAPRES) 0.2 mg tablet Take 1 Tab by mouth two (2) times a day. (Patient taking differently: Take 0.2 mg by mouth two (2) times a day. Indications: 1/2 a tablet in addition to full tablet qod) 60 Tab 0    memantine ER (NAMENDA XR) 21 mg capsule Take 21 mg by mouth daily.  folic acid/multivit-min/lutein (CENTRUM SILVER PO) Take 1 Tab by mouth daily.  allopurinol (ZYLOPRIM) 100 mg tablet Take 100 mg by mouth every Monday and Friday.  atorvastatin (LIPITOR) 20 mg tablet Take 20 mg by mouth daily.  clopidogrel (PLAVIX) 75 mg tab Take 75 mg by mouth daily.  carvedilol (COREG) 12.5 mg tablet TAKE 1 TABLET TWICE A DAY WITH MEALS (Patient taking differently: 6.25 mg two (2) times a day.) 180 Tab 2    furosemide (LASIX) 40 mg tablet Take 1 Tab by mouth daily. (Patient taking differently: Take 40 mg by mouth daily. Indications: extra 20 mg every wednesday) 90 Tab 5    felodipine (PLENDIL SR) 10 mg 24 hr tablet Take 1 Tab by mouth daily. 180 Tab 5    aspirin delayed-release 81 mg tablet Take 81 mg by mouth daily.  loratadine (CLARITIN) 10 mg tablet Take 10 mg by mouth daily as needed for Allergies.  ferrous sulfate 325 mg (65 mg iron) tablet Take 325 mg by mouth Daily (before breakfast).  donepezil (ARICEPT) 10 mg tablet Take 10 mg by mouth nightly.          No Known Allergies    Social History     Socioeconomic History    Marital status:      Spouse name: Not on file    Number of children: Not on file    Years of education: Not on file    Highest education level: Not on file   Occupational History    Not on file   Social Needs    Financial resource strain: Not on file    Food insecurity:     Worry: Not on file     Inability: Not on file    Transportation needs:     Medical: Not on file     Non-medical: Not on file   Tobacco Use    Smoking status: Never Smoker    Smokeless tobacco: Never Used   Substance and Sexual Activity    Alcohol use: No    Drug use: Not on file    Sexual activity: Never   Lifestyle    Physical activity:     Days per week: Not on file     Minutes per session: Not on file    Stress: Not on file   Relationships    Social connections:     Talks on phone: Not on file     Gets together: Not on file     Attends Jew service: Not on file     Active member of club or organization: Not on file     Attends meetings of clubs or organizations: Not on file     Relationship status: Not on file    Intimate partner violence:     Fear of current or ex partner: Not on file     Emotionally abused: Not on file Physically abused: Not on file     Forced sexual activity: Not on file   Other Topics Concern    Not on file   Social History Narrative    Not on file      FH: n/a    Review of Systems    14 pt Review of Systems is negative unless otherwise mentioned in the HPI. Wt Readings from Last 3 Encounters:   08/22/19 55.3 kg (122 lb)   06/13/19 52.6 kg (116 lb)   06/11/19 55.3 kg (121 lb 14.6 oz)     Temp Readings from Last 3 Encounters:   06/11/19 98.2 °F (36.8 °C)   05/01/18 98.2 °F (36.8 °C)   05/07/13 98.1 °F (36.7 °C)     BP Readings from Last 3 Encounters:   06/13/19 140/46   06/11/19 130/46   05/01/18 185/60     Pulse Readings from Last 3 Encounters:   06/13/19 (!) 58   06/11/19 84   05/01/18 67       06/05/19   ECHO ADULT COMPLETE 06/06/2019 6/6/2019    Narrative · Technically difficult due to limited patient compliance. · Left Ventricle: Moderate septal asymmetric hypertrophy. Calculated left   ventricular ejection fraction is 55%. Visually measured ejection fraction. No regional wall motion abnormality noted. Inconclusive left ventricular   diastolic function. · IVC/Hepatic Veins: Mildly elevated central venous pressure (5-10 mmHg);   IVC diameter is less than 21 mm and collapses less than 50% with   respiration. · Tricuspid Valve: Moderate tricuspid valve regurgitation is present. · Pulmonary Artery: Moderate pulmonary hypertension. PASP 52mmHg  · Left Atrium: Severely dilated left atrium. · Pericardium: There is left pleural effusion. Signed by: Mil King MD       Physical Exam:    Visit Vitals  Ht 4' 11\" (1.499 m)   Wt 55.3 kg (122 lb)   BMI 24.64 kg/m²      exm limited due to pt in wheelchair  Physical Exam   Constitutional: She is oriented to person, place, and time. She appears well-developed and well-nourished. HENT:   Head: Normocephalic and atraumatic. Eyes: Pupils are equal, round, and reactive to light.  EOM are normal.   Neck:   Unable to appreciate JVP, Cardiovascular: Normal rate, regular rhythm and intact distal pulses. Exam reveals no gallop and no friction rub. Murmur heard. Pulmonary/Chest: Effort normal and breath sounds normal. No respiratory distress. She has no wheezes. She has no rales. She exhibits no tenderness. Abdominal: Soft. Bowel sounds are normal.   Musculoskeletal: She exhibits no edema or tenderness. Neurological: She is alert and oriented to person, place, and time. Skin: Skin is warm and dry. Psychiatric: She has a normal mood and affect. EKG today shows: Sinus jenny, RBBB    Impression and Plan:  Cortez Riley is a 80 y.o. with:    1.) HFpEF, stable/ well compensated  2.) Moderate TR/ Pulm HTN, known  3.) HTN  4.) H/o CVA and Alz Dementia with advanced age, known  5.) CKD3  6.) RBBB  7.) DNR    1.) Continue current med regimen, includes daily lasix  2.) Continue close monitoring of fluid status  3.) RTC ~4 months with NP  4.) RTC ~8 months with me HFpEF    Pt doing very well despite comorbidites  Will be at risk for fluid overload due to TR/ RHF/ HFpEF  But encouraged to continue good appetite with close monitoring  Has such good supportive care with family/ caregivers and our nurse navigator  >30 mins spent with CHF education, all questions answered    Thank you for allowing me to participate in the care of your patient, please do not hesitate to call with questions or concerns.     Kind Regards,    Elvin Wright, DO

## 2019-08-26 NOTE — PROGRESS NOTES
Patient has graduated from the Disease Specific Care Management  program on 8/26/19. Patient's symptoms are stable at this time. Patient/family has the ability to self-manage. Care management goals have been completed at this time. No further nurse navigator follow up scheduled. Goals Addressed                 This Visit's Progress     COMPLETED: Attends follow-up appointments as directed. 06/12/19 Patient will attend all scheduled appointments through 09/12/19       COMPLETED: Knowledge and adherence medication (ie. action, side effects, missed dose, etc.)        06/12/19 Pt will take all medications prescribed to be evaluated on each outreach through 09/12/19       COMPLETED: Maintains daily weight.        06/12/19  Pt will weight themselves daily and log the results to be evaluated on each outreach through 09/12/19            Pt has nurse navigator's contact information for any further questions, concerns, or needs.   Patients upcoming visits:    Future Appointments   Date Time Provider Lucero Noriega   12/19/2019  1:00 PM Holden Lebron NP [address] [place] [address]   4/22/2020 12:40 PM Jenny Gotti DO [address] [place] [address]

## 2019-12-02 NOTE — TELEPHONE ENCOUNTER
Patient daughter and caregiver called with concerns that her mother weight has gone up to 130 . Patient daughter states she has noticed that she has fullness in belly area but no swelling in lower extremities. She has also having sob and weakness since thanksgiving. Patient daughter states that her new PCP Dr Carri Ramirez took her off the coreg due to heart rate in the 40's . Patient daughter states that PCP also put her on Vit E and Vit D too. Patient is sleeping elevated per the daughter. Pt does have cough and is wheezing also. Dr Trey Wick aware in absence of Dr Shahab Robertson,    Verbal order and read back per Manluc Maria C, DO  Increase lasix to 80 mg x 2 then 60 mg daily thereafter until appt with NP   Get BMP, BNP and CXR also    Daughter is aware of instructions and also verbalized with readback    Patient daughter aware to call if any concerns or questions with the increase to medications.

## 2019-12-03 NOTE — PROGRESS NOTES
Reason testing was ordered f/u appt with Np Demi Hsieh 12/19/2019 \" Patient daughter and caregiver called with concerns that her mother weight has gone up to 130 . Patient daughter states she has noticed that she has fullness in belly area but no swelling in lower extremities. She has also having sob and weakness since thanksgiving. Patient daughter states that her new PCP Dr Riky Allen took her off the coreg due to heart rate in the 40's . Patient daughter states that PCP also put her on Vit E and Vit D too. Patient is sleeping elevated per the daughter. Pt does have cough and is wheezing also.  Dr Nilson Mccain aware in absence of Dr Kena Singh,     Verbal order and read back per Aminata Cooper, DO  Increase lasix to 80 mg x 2 then 60 mg daily thereafter until appt with NP   Get BMP, BNP and CXR also

## 2019-12-03 NOTE — PROGRESS NOTES
Reason testing was ordered patient also has appt with Dillan Vasquez on 12/19/2019 \" Patient daughter and caregiver called with concerns that her mother weight has gone up to 130 . Patient daughter states she has noticed that she has fullness in belly area but no swelling in lower extremities. She has also having sob and weakness since thanksgiving. Patient daughter states that her new PCP Dr Jacob Venegas took her off the coreg due to heart rate in the 40's . Patient daughter states that PCP also put her on Vit E and Vit D too. Patient is sleeping elevated per the daughter. Pt does have cough and is wheezing also.  Dr Mark Bauman aware in absence of Dr Alvaro Mcwilliams,     Verbal order and read back per Eben Rutherford, DO  Increase lasix to 80 mg x 2 then 60 mg daily thereafter until appt with NP   Get BMP, BNP and CXR also

## 2019-12-10 NOTE — PROGRESS NOTES
CXR looks OK. I actually ordered for Dr. Clint Andrews and will leave it to her to follow up along with labs.  ES

## 2019-12-12 NOTE — TELEPHONE ENCOUNTER
----- Message from Daniel Desir DO sent at 12/11/2019  1:00 PM EST -----  Regarding: please call  I'm aware pt's daughter and caregiver called, concerned pt's weight was up to 130. Dr. Stefania Martínez ordered blood work and increased her Lasix. .. What is her weight now? Is she improving? Her chest xray was okay, her BNP was elevated but not as high as it has been during prior hospitalizations. Her kidney function is elevated above her baseline so I prefer to come down on her diuretics if she's improving. .. I know she has an appt with Rush Haider very soon, just wanted to give them results of testing and make sure we're moving in right direction, thanks!  ----- Message -----  From: Christel Rush DO  Sent: 12/10/2019   1:50 PM EST  To: Sarika Carbajal RN, Daniel Desir DO    This is Dr. Kala Hough patient.  ES

## 2019-12-12 NOTE — LETTER
1/20/2020 2:01 PM 
 
Ms. Patricia Antonio [address] [facility] [id number] Dear Ms. Lela Garg, We have been unable to reach you by phone to notify you of your test results. Please call our office at [phone] and ask to speak with my nurse in order to explain these results to you and advise you of any recommendations. Sincerely, Deana Grover, DO

## 2019-12-16 NOTE — PROGRESS NOTES
Nirav Ibarra presents today for a 4 month check-up. She was last seen by Dr. Adolfo Luz on August 22, 2019. She is a 80year old female with history of hypertension, chronic kidney disease (stage 3), TIA, and Alzheimer's. She is a DNR. She was hospitalized from 6/5/19 through 6/11/19 for acute diastolic heart failure and acute hypoxic respiratory failure requiring Bipap. She presented with complaints of shortness of breath. Her NT pro-BNP was 25,638. She was given IV lasix and her symptoms improved. Her troponins were mildly elevated and peaked at 1.10 and was felt to be due to demand ischemia. After being diuresed, she had 3.5 liters negative balance. She had an echo done and it showed an EF of 55%, moderate TR, and PASP of 52 mmHg. She also had a left pleural effusion. Lower extremity duplex study was negative for DVT. She was accompanied to the office today by her son and her caregiver. They report that she has been doing well since her recent episode of some mild volume overload. She had a chest x-ray done which did not show any acute cardiopulmonary changes and her BNP was slightly elevated but lower than in the past.  At that time, her Lasix was increased to 60 mg daily but now she is back to taking 40 mg daily. Since that time, she has been doing well. Denies chest pain, tightness, heaviness, and palpitations. Denies shortness of breath at rest, dyspnea on exertion, orthopnea and PND. Denies abdominal bloating. Denies lightheadedness, dizziness, and syncope. Denies lower extremity edema and claudication. Denies nausea, vomiting, diarrhea, melena, hematochezia. Denies hematuria, urgency, frequency. Denies fever, chills. She is hard of hearing. PMH: 
Past Medical History:  
Diagnosis Date  Anemia NEC  Anxiety  Cataract   
 bilat  Colon cancer ([facility], [place] [address] 1999  CRI (chronic renal insufficiency) 4/07  CVA (cerebral infarction) 4/01  
 right facial droop  CVA (cerebral infarction) 2/11  
 left post corona radiata  Dementia  Diverticulosis  Edema  GERD (gastroesophageal reflux disease)  Hypercholesterolemia  Hypertension  Hypomagnesemia 2/11  Lipoma of neck   
 right  Orthostatic hypotension  RBBB (right bundle branch block)  Syncope 6/08  TIA (transient ischemic attack) mid 1990's PSH: 
Past Surgical History:  
Procedure Laterality Date  ENDOSCOPY, COLON, DIAGNOSTIC  2006  HX BREAST BIOPSY  HX CATARACT REMOVAL  10/02  
 left  HX CATARACT REMOVAL  6/03  
 right  HX COLECTOMY  1999  
 right hemicolectomy  HX HYSTERECTOMY  HX LIPOMA RESECTION    
 
 
MEDS: 
Current Outpatient Medications Medication Sig  ergocalciferol (ERGOCALCIFEROL) 50,000 unit capsule TAKE 1 CAPSULE WEEKLY  vitamin e (E GEMS) 1,000 unit capsule Take 1,000 Units by mouth daily.  ferrous sulfate (SLOW FE) 142 mg (45 mg iron) ER tablet Take  by mouth Daily (before breakfast).  diazePAM (VALIUM) 5 mg tablet Take 2.5 mg by mouth nightly.  cloNIDine HCl (CATAPRES) 0.2 mg tablet Take 1 Tab by mouth two (2) times a day. (Patient taking differently: Take 0.2 mg by mouth two (2) times a day. Indications: 1/2 a tablet in addition to full tablet qod)  memantine ER (NAMENDA XR) 21 mg capsule Take 21 mg by mouth daily.  folic acid/multivit-min/lutein (CENTRUM SILVER PO) Take 1 Tab by mouth daily.  allopurinol (ZYLOPRIM) 100 mg tablet Take 100 mg by mouth every Monday and Friday.  atorvastatin (LIPITOR) 20 mg tablet Take 20 mg by mouth daily.  loratadine (CLARITIN) 10 mg tablet Take 10 mg by mouth daily as needed for Allergies.  clopidogrel (PLAVIX) 75 mg tab Take 75 mg by mouth daily.  donepezil (ARICEPT) 10 mg tablet Take 10 mg by mouth nightly.  furosemide (LASIX) 40 mg tablet Take 1 Tab by mouth daily. (Patient taking differently: Take 40 mg by mouth daily.  Indications: extra 20 mg every wednesday)  felodipine (PLENDIL SR) 10 mg 24 hr tablet Take 1 Tab by mouth daily.  aspirin delayed-release 81 mg tablet Take 81 mg by mouth daily. No current facility-administered medications for this visit. Allergies and Sensitivities: 
No Known Allergies Family History: No family history on file. Social History: She  reports that she has never smoked. She has never used smokeless tobacco.  She  reports no history of alcohol use. Physical: 
Visit Vitals /64 (BP 1 Location: Right arm, BP Patient Position: Sitting) Pulse (!) 56 Resp 16 Ht 4' 11\" (1.499 m) Wt 57.6 kg (127 lb) SpO2 98% BMI 25.65 kg/m² Her weight is up 5 pounds since her last visit. Exam: 
Neck:  Supple, no JVD, no carotid bruits CV:  Normal S1 and  S2, no murmurs, rubs, or gallops noted Lungs:  Clear to ausculation throughout, no wheezes or rales Abd:  Soft, non-tender, non-distended with good bowel sounds. No hepatosplenomegaly Extremities:  No edema Data: 
EKG:  
 
 
LABS: 
Lab Results Component Value Date/Time Sodium 141 12/02/2019 01:15 PM  
 Potassium 4.8 12/02/2019 01:15 PM  
 Chloride 105 12/02/2019 01:15 PM  
 CO2 28 12/02/2019 01:15 PM  
 Glucose 112 (H) 12/02/2019 01:15 PM  
 BUN 32 (H) 12/02/2019 01:15 PM  
 Creatinine 2.30 (H) 12/02/2019 01:15 PM  
 
Lab Results Component Value Date/Time Cholesterol, total 123 06/06/2019 05:38 AM  
 HDL Cholesterol 62 (H) 06/06/2019 05:38 AM  
 LDL, calculated 44.8 06/06/2019 05:38 AM  
 Triglyceride 81 06/06/2019 05:38 AM  
 CHOL/HDL Ratio 2.0 06/06/2019 05:38 AM  
 
Lab Results Component Value Date/Time ALT (SGPT) 51 06/06/2019 05:38 AM  
 
 
 
Impression/Plan: 1. Diastolic heart failure, appears compensated 2. Hypertension, blood pressure controlled 3. History of CVA 4. Chronic kidney disease, stage 3 
5. Alzheimer's dementia 6. History of TIA 7.   DNR 
 
 Ms. Jj Bahena was seen today for a 4 month check-up. According to her caregiver and her son, she has been doing well since her brief episode of volume overload earlier this month. She took 60 mg of Lasix daily for a few days and now she is back to 40 mg daily. They state that they are adhering to dietary sodium and fluid restrictions. She has a good appetite and has been eating well. Her caregiver keeps a very close eye on her and is aware of subtle changes in her condition. She reports signs and symptoms quickly so that adjustments in her medications can be made. Mrs. Jj Bahena states that she is feeling well and that her appetite is good. She denies any cardiac complaints at present. No changes will be made to her medication regimen. Congestive heart failure teaching reinforced today. Advised to limit sodium intake to no more than 2000mg per day and to also limit fluid intake to 48 ounces per day (unless otherwise specified). Advised to weigh daily every morning and record weights. Instructed to call our office if progressive weight gain is noted over a 2 to 3 day period of time, shortness of breath increases, or if abdominal bloating, nausea, fatigue, or increased lower extremity edema is noted. She will follow-up with Dr. Shanell Cardenas as scheduled and as needed. Chip Desir MSN, FNP-BC Please note:  Portions of this chart were created with Dragon medical speech to text program.  Unrecognized errors may be present.

## 2019-12-19 NOTE — PROGRESS NOTES
Theresa Marie is a 80 y.o. female presents in office for Chief Complaint Patient presents with  CHF  
  follow up Visit Vitals /64 (BP 1 Location: Right arm, BP Patient Position: Sitting) Pulse (!) 56 Resp 16 Ht 4' 11\" (1.499 m) Wt 57.6 kg (127 lb) SpO2 98% BMI 25.65 kg/m² Health Maintenance Due Topic Date Due  
 DTaP/Tdap/Td series (1 - Tdap) 09/16/1933  Shingrix Vaccine Age 50> (1 of 2) 09/16/1972  GLAUCOMA SCREENING Q2Y  09/16/1987  Bone Densitometry (Dexa) Screening  09/16/1987  Pneumococcal 65+ years (1 of 1 - PPSV23) 09/13/2017  MEDICARE YEARLY EXAM  03/14/2018  Influenza Age 5 to Adult  08/01/2019 1. Have you been to the ER, urgent care clinic since your last visit? Hospitalized since your last visit? No 
 
2. Have you seen or consulted any other health care providers outside of the [address] [place] [address] since your last visit? Include any pap smears or colon screening. No 
 
3 most recent PHQ Screens 6/13/2019 Little interest or pleasure in doing things Not at all Feeling down, depressed, irritable, or hopeless Not at all Total Score PHQ 2 0 Abuse Screening Questionnaire 12/19/2019 Do you ever feel afraid of your partner? Aditya Cruz Are you in a relationship with someone who physically or mentally threatens you? Aditya Cruz Is it [facility] for you to go [facility], last 12 mths 8/22/2019 Able to walk? Yes Fall in past 12 months? No  
 
 
Learning Assessment 8/22/2019 PRIMARY LEARNER  BARRIERS PRIMARY LEARNER NONE  
CO-LEARNER NAME NO  
PRIMARY LANGUAGE ENGLISH  
LEARNER PREFERENCE PRIMARY LISTENING  
ANSWERED BY CAREGIVER  
RELATIONSHIP OTHER

## 2020-01-01 ENCOUNTER — HOSPITAL ENCOUNTER (OUTPATIENT)
Dept: LAB | Age: 85
Discharge: HOME | End: 2020-03-09

## 2020-01-01 ENCOUNTER — APPOINTMENT (OUTPATIENT)
Dept: GENERAL RADIOLOGY | Age: 85
DRG: 283 | End: 2020-01-01
Attending: PHYSICIAN ASSISTANT
Payer: MEDICARE

## 2020-01-01 ENCOUNTER — TELEPHONE (OUTPATIENT)
Dept: CARDIOLOGY CLINIC | Age: 85
End: 2020-01-01

## 2020-01-01 ENCOUNTER — APPOINTMENT (OUTPATIENT)
Dept: GENERAL RADIOLOGY | Age: 85
DRG: 689 | End: 2020-01-01
Attending: EMERGENCY MEDICINE
Payer: MEDICARE

## 2020-01-01 ENCOUNTER — APPOINTMENT (OUTPATIENT)
Dept: NON INVASIVE DIAGNOSTICS | Age: 85
DRG: 470 | End: 2020-01-01
Attending: INTERNAL MEDICINE
Payer: MEDICARE

## 2020-01-01 ENCOUNTER — HOSPITAL ENCOUNTER (INPATIENT)
Age: 85
LOS: 2 days | DRG: 283 | End: 2020-06-15
Attending: EMERGENCY MEDICINE | Admitting: INTERNAL MEDICINE
Payer: MEDICARE

## 2020-01-01 ENCOUNTER — HOSPITAL ENCOUNTER (INPATIENT)
Age: 85
LOS: 4 days | Discharge: NURSING, REHAB OR LONG-TERM CARE | DRG: 470 | End: 2020-03-04
Attending: EMERGENCY MEDICINE | Admitting: INTERNAL MEDICINE
Payer: MEDICARE

## 2020-01-01 ENCOUNTER — ANESTHESIA (OUTPATIENT)
Dept: SURGERY | Age: 85
DRG: 470 | End: 2020-01-01
Payer: MEDICARE

## 2020-01-01 ENCOUNTER — OFFICE VISIT (OUTPATIENT)
Dept: ORTHOPEDIC SURGERY | Facility: CLINIC | Age: 85
End: 2020-01-01

## 2020-01-01 ENCOUNTER — APPOINTMENT (OUTPATIENT)
Dept: GENERAL RADIOLOGY | Age: 85
DRG: 470 | End: 2020-01-01
Attending: ORTHOPAEDIC SURGERY
Payer: MEDICARE

## 2020-01-01 ENCOUNTER — APPOINTMENT (OUTPATIENT)
Dept: CT IMAGING | Age: 85
DRG: 689 | End: 2020-01-01
Attending: INTERNAL MEDICINE
Payer: MEDICARE

## 2020-01-01 ENCOUNTER — APPOINTMENT (OUTPATIENT)
Dept: GENERAL RADIOLOGY | Age: 85
DRG: 470 | End: 2020-01-01
Attending: EMERGENCY MEDICINE
Payer: MEDICARE

## 2020-01-01 ENCOUNTER — HOSPITAL ENCOUNTER (INPATIENT)
Age: 85
LOS: 4 days | Discharge: HOME | DRG: 689 | End: 2020-01-09
Attending: EMERGENCY MEDICINE | Admitting: INTERNAL MEDICINE
Payer: MEDICARE

## 2020-01-01 ENCOUNTER — ANESTHESIA EVENT (OUTPATIENT)
Dept: SURGERY | Age: 85
DRG: 470 | End: 2020-01-01
Payer: MEDICARE

## 2020-01-01 ENCOUNTER — APPOINTMENT (OUTPATIENT)
Dept: NON INVASIVE DIAGNOSTICS | Age: 85
DRG: 283 | End: 2020-01-01
Attending: NURSE PRACTITIONER
Payer: MEDICARE

## 2020-01-01 ENCOUNTER — TELEPHONE (OUTPATIENT)
Dept: ORTHOPEDIC SURGERY | Age: 85
End: 2020-01-01

## 2020-01-01 ENCOUNTER — VIRTUAL VISIT (OUTPATIENT)
Dept: CARDIOLOGY CLINIC | Age: 85
End: 2020-01-01

## 2020-01-01 ENCOUNTER — APPOINTMENT (OUTPATIENT)
Dept: ULTRASOUND IMAGING | Age: 85
DRG: 470 | End: 2020-01-01
Attending: EMERGENCY MEDICINE
Payer: MEDICARE

## 2020-01-01 ENCOUNTER — OFFICE VISIT (OUTPATIENT)
Dept: CARDIOLOGY CLINIC | Age: 85
End: 2020-01-01

## 2020-01-01 VITALS
HEART RATE: 60 BPM | RESPIRATION RATE: 18 BRPM | HEIGHT: 59 IN | TEMPERATURE: 96.8 F | OXYGEN SATURATION: 99 % | SYSTOLIC BLOOD PRESSURE: 139 MMHG | DIASTOLIC BLOOD PRESSURE: 83 MMHG | BODY MASS INDEX: 28.48 KG/M2

## 2020-01-01 VITALS
OXYGEN SATURATION: 99 % | TEMPERATURE: 97.3 F | HEIGHT: 59 IN | RESPIRATION RATE: 16 BRPM | HEART RATE: 41 BPM | DIASTOLIC BLOOD PRESSURE: 58 MMHG | BODY MASS INDEX: 28.43 KG/M2 | WEIGHT: 141 LBS | SYSTOLIC BLOOD PRESSURE: 92 MMHG

## 2020-01-01 VITALS
OXYGEN SATURATION: 97 % | BODY MASS INDEX: 28.48 KG/M2 | HEART RATE: 73 BPM | HEIGHT: 59 IN | SYSTOLIC BLOOD PRESSURE: 142 MMHG | DIASTOLIC BLOOD PRESSURE: 58 MMHG

## 2020-01-01 VITALS
BODY MASS INDEX: 28.43 KG/M2 | TEMPERATURE: 98.3 F | DIASTOLIC BLOOD PRESSURE: 57 MMHG | OXYGEN SATURATION: 95 % | WEIGHT: 141 LBS | RESPIRATION RATE: 18 BRPM | SYSTOLIC BLOOD PRESSURE: 120 MMHG | HEIGHT: 59 IN | HEART RATE: 88 BPM

## 2020-01-01 VITALS
OXYGEN SATURATION: 100 % | WEIGHT: 133.3 LBS | TEMPERATURE: 96.9 F | BODY MASS INDEX: 26.87 KG/M2 | RESPIRATION RATE: 15 BRPM | SYSTOLIC BLOOD PRESSURE: 204 MMHG | DIASTOLIC BLOOD PRESSURE: 80 MMHG | HEIGHT: 59 IN | HEART RATE: 109 BPM

## 2020-01-01 DIAGNOSIS — I50.9 ACUTE CONGESTIVE HEART FAILURE, UNSPECIFIED HEART FAILURE TYPE (HCC): Primary | ICD-10-CM

## 2020-01-01 DIAGNOSIS — Z76.0 MEDICATION REFILL: ICD-10-CM

## 2020-01-01 DIAGNOSIS — I50.32 DIASTOLIC CHF, CHRONIC (HCC): Primary | ICD-10-CM

## 2020-01-01 DIAGNOSIS — N39.0 URINARY TRACT INFECTION WITHOUT HEMATURIA, SITE UNSPECIFIED: ICD-10-CM

## 2020-01-01 DIAGNOSIS — Z86.59 HISTORY OF DEMENTIA: ICD-10-CM

## 2020-01-01 DIAGNOSIS — I50.9 ACUTE CONGESTIVE HEART FAILURE, UNSPECIFIED HEART FAILURE TYPE (HCC): ICD-10-CM

## 2020-01-01 DIAGNOSIS — R77.8 ELEVATED TROPONIN: ICD-10-CM

## 2020-01-01 DIAGNOSIS — R06.00 DYSPNEA, UNSPECIFIED TYPE: ICD-10-CM

## 2020-01-01 DIAGNOSIS — R41.82 ALTERED MENTAL STATUS, UNSPECIFIED ALTERED MENTAL STATUS TYPE: Primary | ICD-10-CM

## 2020-01-01 DIAGNOSIS — E87.5 ACUTE HYPERKALEMIA: ICD-10-CM

## 2020-01-01 DIAGNOSIS — S72.001A CLOSED RIGHT HIP FRACTURE, INITIAL ENCOUNTER (HCC): Primary | ICD-10-CM

## 2020-01-01 DIAGNOSIS — I24.9 ACS (ACUTE CORONARY SYNDROME) (HCC): Primary | ICD-10-CM

## 2020-01-01 DIAGNOSIS — I50.31 ACUTE DIASTOLIC HEART FAILURE (HCC): ICD-10-CM

## 2020-01-01 DIAGNOSIS — N18.9 CHRONIC KIDNEY DISEASE, UNSPECIFIED CKD STAGE: ICD-10-CM

## 2020-01-01 DIAGNOSIS — I10 ESSENTIAL HYPERTENSION: ICD-10-CM

## 2020-01-01 DIAGNOSIS — N17.9 AKI (ACUTE KIDNEY INJURY) (HCC): ICD-10-CM

## 2020-01-01 LAB
ALBUMIN SERPL-MCNC: 2.3 G/DL (ref 3.4–5)
ALBUMIN SERPL-MCNC: 2.6 G/DL (ref 3.4–5)
ALBUMIN SERPL-MCNC: 2.8 G/DL (ref 3.4–5)
ALBUMIN SERPL-MCNC: 3 G/DL (ref 3.4–5)
ALBUMIN SERPL-MCNC: 3 G/DL (ref 3.4–5)
ALBUMIN/GLOB SERPL: 0.6 {RATIO} (ref 0.8–1.7)
ALBUMIN/GLOB SERPL: 0.6 {RATIO} (ref 0.8–1.7)
ALBUMIN/GLOB SERPL: 0.7 {RATIO} (ref 0.8–1.7)
ALBUMIN/GLOB SERPL: 0.7 {RATIO} (ref 0.8–1.7)
ALP SERPL-CCNC: 85 U/L (ref 45–117)
ALP SERPL-CCNC: 86 U/L (ref 45–117)
ALP SERPL-CCNC: 89 U/L (ref 45–117)
ALP SERPL-CCNC: 90 U/L (ref 45–117)
ALT SERPL-CCNC: 20 U/L (ref 13–56)
ALT SERPL-CCNC: 28 U/L (ref 13–56)
ALT SERPL-CCNC: 42 U/L (ref 13–56)
ALT SERPL-CCNC: 544 U/L (ref 13–56)
ANION GAP SERPL CALC-SCNC: 10 MMOL/L (ref 3–18)
ANION GAP SERPL CALC-SCNC: 11 MMOL/L (ref 3–18)
ANION GAP SERPL CALC-SCNC: 12 MMOL/L (ref 3–18)
ANION GAP SERPL CALC-SCNC: 16 MMOL/L (ref 3–18)
ANION GAP SERPL CALC-SCNC: 3 MMOL/L (ref 3–18)
ANION GAP SERPL CALC-SCNC: 5 MMOL/L (ref 3–18)
ANION GAP SERPL CALC-SCNC: 6 MMOL/L (ref 3–18)
ANION GAP SERPL CALC-SCNC: 7 MMOL/L (ref 3–18)
ANION GAP SERPL CALC-SCNC: 8 MMOL/L (ref 3–18)
ANION GAP SERPL CALC-SCNC: 9 MMOL/L (ref 3–18)
ANION GAP SERPL CALC-SCNC: 9 MMOL/L (ref 3–18)
APPEARANCE UR: CLEAR
APTT PPP: 144.4 SEC (ref 23–36.4)
APTT PPP: 29.1 SEC (ref 23–36.4)
APTT PPP: 31.9 SEC (ref 23–36.4)
APTT PPP: 65.9 SEC (ref 23–36.4)
APTT PPP: 92.3 SEC (ref 23–36.4)
AST SERPL-CCNC: 1131 U/L (ref 10–38)
AST SERPL-CCNC: 29 U/L (ref 10–38)
AST SERPL-CCNC: 31 U/L (ref 10–38)
AST SERPL-CCNC: 60 U/L (ref 10–38)
ATRIAL RATE: 41 BPM
ATRIAL RATE: 71 BPM
ATRIAL RATE: 84 BPM
ATRIAL RATE: 85 BPM
BACTERIA SPEC CULT: ABNORMAL
BACTERIA SPEC CULT: ABNORMAL
BACTERIA URNS QL MICRO: ABNORMAL /HPF
BASOPHILS # BLD: 0 K/UL (ref 0–0.06)
BASOPHILS # BLD: 0 K/UL (ref 0–0.1)
BASOPHILS NFR BLD: 0 % (ref 0–2)
BASOPHILS NFR BLD: 0 % (ref 0–3)
BILIRUB SERPL-MCNC: 0.3 MG/DL (ref 0.2–1)
BILIRUB SERPL-MCNC: 0.5 MG/DL (ref 0.2–1)
BILIRUB SERPL-MCNC: 0.9 MG/DL (ref 0.2–1)
BILIRUB SERPL-MCNC: 1.2 MG/DL (ref 0.2–1)
BILIRUB UR QL: NEGATIVE
BNP SERPL-MCNC: 8918 PG/ML (ref 0–1800)
BNP SERPL-MCNC: ABNORMAL PG/ML (ref 0–1800)
BUN SERPL-MCNC: 25 MG/DL (ref 7–18)
BUN SERPL-MCNC: 25 MG/DL (ref 7–18)
BUN SERPL-MCNC: 28 MG/DL (ref 7–18)
BUN SERPL-MCNC: 29 MG/DL (ref 7–18)
BUN SERPL-MCNC: 30 MG/DL (ref 7–18)
BUN SERPL-MCNC: 30 MG/DL (ref 7–18)
BUN SERPL-MCNC: 32 MG/DL (ref 7–18)
BUN SERPL-MCNC: 34 MG/DL (ref 7–18)
BUN SERPL-MCNC: 35 MG/DL (ref 7–18)
BUN SERPL-MCNC: 37 MG/DL (ref 7–18)
BUN SERPL-MCNC: 37 MG/DL (ref 7–18)
BUN SERPL-MCNC: 40 MG/DL (ref 7–18)
BUN SERPL-MCNC: 43 MG/DL (ref 7–18)
BUN SERPL-MCNC: 45 MG/DL (ref 7–18)
BUN/CREAT SERPL: 11 (ref 12–20)
BUN/CREAT SERPL: 13 (ref 12–20)
BUN/CREAT SERPL: 14 (ref 12–20)
BUN/CREAT SERPL: 15 (ref 12–20)
BUN/CREAT SERPL: 15 (ref 12–20)
BUN/CREAT SERPL: 16 (ref 12–20)
BUN/CREAT SERPL: 17 (ref 12–20)
BUN/CREAT SERPL: 20 (ref 12–20)
BUN/CREAT SERPL: 20 (ref 12–20)
BUN/CREAT SERPL: 21 (ref 12–20)
CALCIUM SERPL-MCNC: 7.7 MG/DL (ref 8.5–10.1)
CALCIUM SERPL-MCNC: 7.9 MG/DL (ref 8.5–10.1)
CALCIUM SERPL-MCNC: 8.2 MG/DL (ref 8.5–10.1)
CALCIUM SERPL-MCNC: 8.3 MG/DL (ref 8.5–10.1)
CALCIUM SERPL-MCNC: 8.4 MG/DL (ref 8.5–10.1)
CALCIUM SERPL-MCNC: 8.4 MG/DL (ref 8.5–10.1)
CALCIUM SERPL-MCNC: 8.7 MG/DL (ref 8.5–10.1)
CALCIUM SERPL-MCNC: 8.8 MG/DL (ref 8.5–10.1)
CALCIUM SERPL-MCNC: 8.9 MG/DL (ref 8.5–10.1)
CALCIUM SERPL-MCNC: 9 MG/DL (ref 8.5–10.1)
CALCIUM SERPL-MCNC: 9.2 MG/DL (ref 8.5–10.1)
CALCIUM SERPL-MCNC: 9.2 MG/DL (ref 8.5–10.1)
CALCIUM SERPL-MCNC: 9.5 MG/DL (ref 8.5–10.1)
CALCIUM SERPL-MCNC: 9.5 MG/DL (ref 8.5–10.1)
CALCULATED P AXIS, ECG09: 59 DEGREES
CALCULATED P AXIS, ECG09: 66 DEGREES
CALCULATED P AXIS, ECG09: 68 DEGREES
CALCULATED R AXIS, ECG10: 17 DEGREES
CALCULATED R AXIS, ECG10: 27 DEGREES
CALCULATED R AXIS, ECG10: 72 DEGREES
CALCULATED R AXIS, ECG10: 80 DEGREES
CALCULATED T AXIS, ECG11: -126 DEGREES
CALCULATED T AXIS, ECG11: -17 DEGREES
CALCULATED T AXIS, ECG11: -45 DEGREES
CALCULATED T AXIS, ECG11: 3 DEGREES
CHLORIDE SERPL-SCNC: 103 MMOL/L (ref 100–111)
CHLORIDE SERPL-SCNC: 103 MMOL/L (ref 100–111)
CHLORIDE SERPL-SCNC: 106 MMOL/L (ref 100–111)
CHLORIDE SERPL-SCNC: 106 MMOL/L (ref 100–111)
CHLORIDE SERPL-SCNC: 108 MMOL/L (ref 100–111)
CHLORIDE SERPL-SCNC: 108 MMOL/L (ref 100–111)
CHLORIDE SERPL-SCNC: 109 MMOL/L (ref 100–111)
CHLORIDE SERPL-SCNC: 110 MMOL/L (ref 100–111)
CHLORIDE SERPL-SCNC: 111 MMOL/L (ref 100–111)
CHLORIDE SERPL-SCNC: 114 MMOL/L (ref 100–111)
CHLORIDE SERPL-SCNC: 115 MMOL/L (ref 100–111)
CHLORIDE SERPL-SCNC: 115 MMOL/L (ref 100–111)
CHLORIDE SERPL-SCNC: 117 MMOL/L (ref 100–111)
CHLORIDE SERPL-SCNC: 117 MMOL/L (ref 100–111)
CHLORIDE SERPL-SCNC: 118 MMOL/L (ref 100–111)
CHLORIDE SERPL-SCNC: 118 MMOL/L (ref 100–111)
CK MB CFR SERPL CALC: 1.2 % (ref 0–4)
CK MB CFR SERPL CALC: 2.4 % (ref 0–4)
CK MB CFR SERPL CALC: 2.9 % (ref 0–4)
CK MB CFR SERPL CALC: 2.9 % (ref 0–4)
CK MB CFR SERPL CALC: 3.9 % (ref 0–4)
CK MB CFR SERPL CALC: 4.6 % (ref 0–4)
CK MB SERPL-MCNC: 2.1 NG/ML (ref 5–25)
CK MB SERPL-MCNC: 2.5 NG/ML (ref 5–25)
CK MB SERPL-MCNC: 3 NG/ML (ref 5–25)
CK MB SERPL-MCNC: 3.6 NG/ML (ref 5–25)
CK MB SERPL-MCNC: 4 NG/ML (ref 5–25)
CK MB SERPL-MCNC: 4.8 NG/ML (ref 5–25)
CK SERPL-CCNC: 123 U/L (ref 26–192)
CK SERPL-CCNC: 167 U/L (ref 26–192)
CK SERPL-CCNC: 297 U/L (ref 26–192)
CK SERPL-CCNC: 65 U/L (ref 26–192)
CK SERPL-CCNC: 73 U/L (ref 26–192)
CK SERPL-CCNC: 85 U/L (ref 26–192)
CO2 SERPL-SCNC: 16 MMOL/L (ref 21–32)
CO2 SERPL-SCNC: 18 MMOL/L (ref 21–32)
CO2 SERPL-SCNC: 19 MMOL/L (ref 21–32)
CO2 SERPL-SCNC: 19 MMOL/L (ref 21–32)
CO2 SERPL-SCNC: 20 MMOL/L (ref 21–32)
CO2 SERPL-SCNC: 20 MMOL/L (ref 21–32)
CO2 SERPL-SCNC: 21 MMOL/L (ref 21–32)
CO2 SERPL-SCNC: 23 MMOL/L (ref 21–32)
CO2 SERPL-SCNC: 23 MMOL/L (ref 21–32)
CO2 SERPL-SCNC: 24 MMOL/L (ref 21–32)
CO2 SERPL-SCNC: 27 MMOL/L (ref 21–32)
CO2 SERPL-SCNC: 29 MMOL/L (ref 21–32)
COLOR UR: YELLOW
CREAT SERPL-MCNC: 1.68 MG/DL (ref 0.6–1.3)
CREAT SERPL-MCNC: 1.73 MG/DL (ref 0.6–1.3)
CREAT SERPL-MCNC: 1.78 MG/DL (ref 0.6–1.3)
CREAT SERPL-MCNC: 1.84 MG/DL (ref 0.6–1.3)
CREAT SERPL-MCNC: 1.85 MG/DL (ref 0.6–1.3)
CREAT SERPL-MCNC: 1.85 MG/DL (ref 0.6–1.3)
CREAT SERPL-MCNC: 1.97 MG/DL (ref 0.6–1.3)
CREAT SERPL-MCNC: 2.17 MG/DL (ref 0.6–1.3)
CREAT SERPL-MCNC: 2.22 MG/DL (ref 0.6–1.3)
CREAT SERPL-MCNC: 2.29 MG/DL (ref 0.6–1.3)
CREAT SERPL-MCNC: 2.61 MG/DL (ref 0.6–1.3)
CREAT SERPL-MCNC: 2.66 MG/DL (ref 0.6–1.3)
CREAT SERPL-MCNC: 2.7 MG/DL (ref 0.6–1.3)
CREAT SERPL-MCNC: 2.81 MG/DL (ref 0.6–1.3)
CREAT SERPL-MCNC: 2.85 MG/DL (ref 0.6–1.3)
CREAT SERPL-MCNC: 3.22 MG/DL (ref 0.6–1.3)
CREAT UR-MCNC: 186 MG/DL (ref 30–125)
DIAGNOSIS, 93000: NORMAL
DIFFERENTIAL METHOD BLD: ABNORMAL
ECHO LV EDV TEICHHOLZ: 0.28 ML
ECHO LV ESV TEICHHOLZ: 0.14 ML
ECHO LV INTERNAL DIMENSION DIASTOLIC: 3.24 CM (ref 3.9–5.3)
ECHO LV INTERNAL DIMENSION SYSTOLIC: 2.42 CM
ECHO LV IVSD: 1.41 CM (ref 0.6–0.9)
ECHO LV MASS 2D: 174 G (ref 67–162)
ECHO LV MASS INDEX 2D: 109.5 G/M2 (ref 43–95)
ECHO LV POSTERIOR WALL DIASTOLIC: 1.33 CM (ref 0.6–0.9)
ECHO TV REGURGITANT MAX VELOCITY: 231.07 CM/S
ECHO TV REGURGITANT PEAK GRADIENT: 21.4 MMHG
EOSINOPHIL # BLD: 0 K/UL (ref 0–0.4)
EOSINOPHIL # BLD: 0.1 K/UL (ref 0–0.4)
EOSINOPHIL # BLD: 0.1 K/UL (ref 0–0.4)
EOSINOPHIL # BLD: 0.3 K/UL (ref 0–0.4)
EOSINOPHIL NFR BLD: 0 % (ref 0–5)
EOSINOPHIL NFR BLD: 1 % (ref 0–5)
EOSINOPHIL NFR BLD: 2 % (ref 0–5)
EOSINOPHIL NFR BLD: 2 % (ref 0–5)
EPITH CASTS URNS QL MICRO: ABNORMAL /LPF (ref 0–5)
ERYTHROCYTE [DISTWIDTH] IN BLOOD BY AUTOMATED COUNT: 15.1 % (ref 11.6–14.5)
ERYTHROCYTE [DISTWIDTH] IN BLOOD BY AUTOMATED COUNT: 15.1 % (ref 11.6–14.5)
ERYTHROCYTE [DISTWIDTH] IN BLOOD BY AUTOMATED COUNT: 15.2 % (ref 11.6–14.5)
ERYTHROCYTE [DISTWIDTH] IN BLOOD BY AUTOMATED COUNT: 15.4 % (ref 11.6–14.5)
ERYTHROCYTE [DISTWIDTH] IN BLOOD BY AUTOMATED COUNT: 15.6 % (ref 11.6–14.5)
ERYTHROCYTE [DISTWIDTH] IN BLOOD BY AUTOMATED COUNT: 15.6 % (ref 11.6–14.5)
ERYTHROCYTE [DISTWIDTH] IN BLOOD BY AUTOMATED COUNT: 15.7 % (ref 11.6–14.5)
ERYTHROCYTE [DISTWIDTH] IN BLOOD BY AUTOMATED COUNT: 15.8 % (ref 11.6–14.5)
ERYTHROCYTE [DISTWIDTH] IN BLOOD BY AUTOMATED COUNT: 15.8 % (ref 11.6–14.5)
ERYTHROCYTE [DISTWIDTH] IN BLOOD BY AUTOMATED COUNT: 15.9 % (ref 11.6–14.5)
GLOBULIN SER CALC-MCNC: 4 G/DL (ref 2–4)
GLOBULIN SER CALC-MCNC: 4.5 G/DL (ref 2–4)
GLOBULIN SER CALC-MCNC: 4.6 G/DL (ref 2–4)
GLOBULIN SER CALC-MCNC: 4.8 G/DL (ref 2–4)
GLUCOSE BLD STRIP.AUTO-MCNC: 111 MG/DL (ref 70–110)
GLUCOSE SERPL-MCNC: 101 MG/DL (ref 74–99)
GLUCOSE SERPL-MCNC: 103 MG/DL (ref 74–99)
GLUCOSE SERPL-MCNC: 108 MG/DL (ref 74–99)
GLUCOSE SERPL-MCNC: 112 MG/DL (ref 74–99)
GLUCOSE SERPL-MCNC: 116 MG/DL (ref 74–99)
GLUCOSE SERPL-MCNC: 116 MG/DL (ref 74–99)
GLUCOSE SERPL-MCNC: 117 MG/DL (ref 74–99)
GLUCOSE SERPL-MCNC: 120 MG/DL (ref 74–99)
GLUCOSE SERPL-MCNC: 122 MG/DL (ref 74–99)
GLUCOSE SERPL-MCNC: 127 MG/DL (ref 74–99)
GLUCOSE SERPL-MCNC: 133 MG/DL (ref 74–99)
GLUCOSE SERPL-MCNC: 138 MG/DL (ref 74–99)
GLUCOSE SERPL-MCNC: 191 MG/DL (ref 74–99)
GLUCOSE SERPL-MCNC: 94 MG/DL (ref 74–99)
GLUCOSE UR STRIP.AUTO-MCNC: NEGATIVE MG/DL
HCT VFR BLD AUTO: 25.2 % (ref 35–45)
HCT VFR BLD AUTO: 25.7 % (ref 35–45)
HCT VFR BLD AUTO: 29.2 % (ref 35–45)
HCT VFR BLD AUTO: 29.8 % (ref 35–45)
HCT VFR BLD AUTO: 29.9 % (ref 35–45)
HCT VFR BLD AUTO: 34.3 % (ref 35–45)
HCT VFR BLD AUTO: 34.6 % (ref 35–45)
HCT VFR BLD AUTO: 35.1 % (ref 35–45)
HCT VFR BLD AUTO: 36.1 % (ref 35–45)
HCT VFR BLD AUTO: 38 % (ref 35–45)
HGB BLD-MCNC: 10.8 G/DL (ref 12–16)
HGB BLD-MCNC: 10.9 G/DL (ref 12–16)
HGB BLD-MCNC: 11.1 G/DL (ref 12–16)
HGB BLD-MCNC: 11.3 G/DL (ref 12–16)
HGB BLD-MCNC: 12 G/DL (ref 12–16)
HGB BLD-MCNC: 7.8 G/DL (ref 12–16)
HGB BLD-MCNC: 8.1 G/DL (ref 12–16)
HGB BLD-MCNC: 9.1 G/DL (ref 12–16)
HGB BLD-MCNC: 9.3 G/DL (ref 12–16)
HGB BLD-MCNC: 9.5 G/DL (ref 12–16)
HGB UR QL STRIP: ABNORMAL
HGB UR QL STRIP: ABNORMAL
HGB UR QL STRIP: NEGATIVE
HGB UR QL STRIP: NEGATIVE
INR PPP: 1.1 (ref 0.8–1.2)
KETONES UR QL STRIP.AUTO: NEGATIVE MG/DL
LEUKOCYTE ESTERASE UR QL STRIP.AUTO: ABNORMAL
LEUKOCYTE ESTERASE UR QL STRIP.AUTO: ABNORMAL
LEUKOCYTE ESTERASE UR QL STRIP.AUTO: NEGATIVE
LEUKOCYTE ESTERASE UR QL STRIP.AUTO: NEGATIVE
LVFS 2D: 25.31 %
LVSV (TEICH): 13.3 ML
LYMPHOCYTES # BLD: 0.7 K/UL (ref 0.9–3.6)
LYMPHOCYTES # BLD: 0.8 K/UL (ref 0.9–3.6)
LYMPHOCYTES # BLD: 0.8 K/UL (ref 0.9–3.6)
LYMPHOCYTES # BLD: 0.9 K/UL (ref 0.9–3.6)
LYMPHOCYTES # BLD: 1.5 K/UL (ref 0.9–3.6)
LYMPHOCYTES # BLD: 1.9 K/UL (ref 0.9–3.6)
LYMPHOCYTES # BLD: 2.5 K/UL (ref 0.8–3.5)
LYMPHOCYTES NFR BLD: 11 % (ref 21–52)
LYMPHOCYTES NFR BLD: 12 % (ref 21–52)
LYMPHOCYTES NFR BLD: 13 % (ref 21–52)
LYMPHOCYTES NFR BLD: 14 % (ref 21–52)
LYMPHOCYTES NFR BLD: 17 % (ref 20–51)
LYMPHOCYTES NFR BLD: 6 % (ref 21–52)
LYMPHOCYTES NFR BLD: 8 % (ref 21–52)
MAGNESIUM SERPL-MCNC: 2.1 MG/DL (ref 1.6–2.6)
MAGNESIUM SERPL-MCNC: 2.2 MG/DL (ref 1.6–2.6)
MAGNESIUM SERPL-MCNC: 2.6 MG/DL (ref 1.6–2.6)
MCH RBC QN AUTO: 24.9 PG (ref 24–34)
MCH RBC QN AUTO: 25.5 PG (ref 24–34)
MCH RBC QN AUTO: 26.7 PG (ref 24–34)
MCH RBC QN AUTO: 27 PG (ref 24–34)
MCH RBC QN AUTO: 27.1 PG (ref 24–34)
MCH RBC QN AUTO: 27.2 PG (ref 24–34)
MCH RBC QN AUTO: 27.5 PG (ref 24–34)
MCH RBC QN AUTO: 27.6 PG (ref 24–34)
MCHC RBC AUTO-ENTMCNC: 31 G/DL (ref 31–37)
MCHC RBC AUTO-ENTMCNC: 31.1 G/DL (ref 31–37)
MCHC RBC AUTO-ENTMCNC: 31.2 G/DL (ref 31–37)
MCHC RBC AUTO-ENTMCNC: 31.3 G/DL (ref 31–37)
MCHC RBC AUTO-ENTMCNC: 31.5 G/DL (ref 31–37)
MCHC RBC AUTO-ENTMCNC: 31.6 G/DL (ref 31–37)
MCHC RBC AUTO-ENTMCNC: 31.6 G/DL (ref 31–37)
MCHC RBC AUTO-ENTMCNC: 31.9 G/DL (ref 31–37)
MCV RBC AUTO: 79.9 FL (ref 74–97)
MCV RBC AUTO: 80 FL (ref 74–97)
MCV RBC AUTO: 84.8 FL (ref 74–97)
MCV RBC AUTO: 86 FL (ref 74–97)
MCV RBC AUTO: 86.3 FL (ref 74–97)
MCV RBC AUTO: 86.4 FL (ref 74–97)
MCV RBC AUTO: 87.2 FL (ref 74–97)
MCV RBC AUTO: 87.2 FL (ref 74–97)
MCV RBC AUTO: 87.5 FL (ref 74–97)
MCV RBC AUTO: 87.8 FL (ref 74–97)
MONOCYTES # BLD: 0.4 K/UL (ref 0.05–1.2)
MONOCYTES # BLD: 0.6 K/UL (ref 0.05–1.2)
MONOCYTES # BLD: 0.7 K/UL (ref 0.05–1.2)
MONOCYTES # BLD: 0.7 K/UL (ref 0.05–1.2)
MONOCYTES # BLD: 0.9 K/UL (ref 0.05–1.2)
MONOCYTES # BLD: 1 K/UL (ref 0.05–1.2)
MONOCYTES # BLD: 1.3 K/UL (ref 0–1)
MONOCYTES NFR BLD: 10 % (ref 3–10)
MONOCYTES NFR BLD: 10 % (ref 3–10)
MONOCYTES NFR BLD: 4 % (ref 3–10)
MONOCYTES NFR BLD: 5 % (ref 3–10)
MONOCYTES NFR BLD: 7 % (ref 3–10)
MONOCYTES NFR BLD: 8 % (ref 3–10)
MONOCYTES NFR BLD: 9 % (ref 2–9)
MUCOUS THREADS URNS QL MICRO: ABNORMAL /LPF
NEUTS SEG # BLD: 11 K/UL (ref 1.8–8)
NEUTS SEG # BLD: 11.1 K/UL (ref 1.8–8)
NEUTS SEG # BLD: 12.7 K/UL (ref 1.8–8)
NEUTS SEG # BLD: 5.6 K/UL (ref 1.8–8)
NEUTS SEG # BLD: 6.5 K/UL (ref 1.8–8)
NEUTS SEG # BLD: 7.5 K/UL (ref 1.8–8)
NEUTS SEG # BLD: 8.7 K/UL (ref 1.8–8)
NEUTS SEG NFR BLD: 74 % (ref 42–75)
NEUTS SEG NFR BLD: 77 % (ref 40–73)
NEUTS SEG NFR BLD: 78 % (ref 40–73)
NEUTS SEG NFR BLD: 78 % (ref 40–73)
NEUTS SEG NFR BLD: 81 % (ref 40–73)
NEUTS SEG NFR BLD: 84 % (ref 40–73)
NEUTS SEG NFR BLD: 89 % (ref 40–73)
NITRITE UR QL STRIP.AUTO: NEGATIVE
P-R INTERVAL, ECG05: 132 MS
P-R INTERVAL, ECG05: 140 MS
P-R INTERVAL, ECG05: 146 MS
PH UR STRIP: 5 [PH] (ref 5–8)
PH UR STRIP: 5 [PH] (ref 5–8)
PH UR STRIP: 5.5 [PH] (ref 5–8)
PH UR STRIP: 6 [PH] (ref 5–8)
PHOSPHATE SERPL-MCNC: 2.5 MG/DL (ref 2.5–4.9)
PHOSPHATE SERPL-MCNC: 4.3 MG/DL (ref 2.5–4.9)
PHOSPHATE SERPL-MCNC: 4.7 MG/DL (ref 2.5–4.9)
PHOSPHATE SERPL-MCNC: 5.5 MG/DL (ref 2.5–4.9)
PHOSPHATE SERPL-MCNC: 5.7 MG/DL (ref 2.5–4.9)
PLATELET # BLD AUTO: 205 K/UL (ref 135–420)
PLATELET # BLD AUTO: 208 K/UL (ref 135–420)
PLATELET # BLD AUTO: 227 K/UL (ref 135–420)
PLATELET # BLD AUTO: 253 K/UL (ref 135–420)
PLATELET # BLD AUTO: 257 K/UL (ref 135–420)
PLATELET # BLD AUTO: 257 K/UL (ref 135–420)
PLATELET # BLD AUTO: 268 K/UL (ref 135–420)
PLATELET # BLD AUTO: 269 K/UL (ref 135–420)
PLATELET # BLD AUTO: 300 K/UL (ref 135–420)
PLATELET # BLD AUTO: 390 K/UL (ref 135–420)
PLATELET COMMENTS,PCOM: ABNORMAL
PMV BLD AUTO: 10 FL (ref 9.2–11.8)
PMV BLD AUTO: 10.1 FL (ref 9.2–11.8)
PMV BLD AUTO: 10.3 FL (ref 9.2–11.8)
PMV BLD AUTO: 10.3 FL (ref 9.2–11.8)
PMV BLD AUTO: 10.5 FL (ref 9.2–11.8)
PMV BLD AUTO: 10.5 FL (ref 9.2–11.8)
PMV BLD AUTO: 10.7 FL (ref 9.2–11.8)
PMV BLD AUTO: 10.7 FL (ref 9.2–11.8)
PMV BLD AUTO: 10.9 FL (ref 9.2–11.8)
PMV BLD AUTO: 9.7 FL (ref 9.2–11.8)
POTASSIUM SERPL-SCNC: 4 MMOL/L (ref 3.5–5.5)
POTASSIUM SERPL-SCNC: 4.1 MMOL/L (ref 3.5–5.5)
POTASSIUM SERPL-SCNC: 4.4 MMOL/L (ref 3.5–5.5)
POTASSIUM SERPL-SCNC: 4.4 MMOL/L (ref 3.5–5.5)
POTASSIUM SERPL-SCNC: 4.7 MMOL/L (ref 3.5–5.5)
POTASSIUM SERPL-SCNC: 4.7 MMOL/L (ref 3.5–5.5)
POTASSIUM SERPL-SCNC: 5.1 MMOL/L (ref 3.5–5.5)
POTASSIUM SERPL-SCNC: 5.3 MMOL/L (ref 3.5–5.5)
POTASSIUM SERPL-SCNC: 5.4 MMOL/L (ref 3.5–5.5)
POTASSIUM SERPL-SCNC: 5.7 MMOL/L (ref 3.5–5.5)
POTASSIUM SERPL-SCNC: 6.1 MMOL/L (ref 3.5–5.5)
POTASSIUM SERPL-SCNC: 6.4 MMOL/L (ref 3.5–5.5)
POTASSIUM SERPL-SCNC: 6.5 MMOL/L (ref 3.5–5.5)
POTASSIUM SERPL-SCNC: 6.7 MMOL/L (ref 3.5–5.5)
POTASSIUM SERPL-SCNC: 7.7 MMOL/L (ref 3.5–5.5)
PROT SERPL-MCNC: 6.8 G/DL (ref 6.4–8.2)
PROT SERPL-MCNC: 7.4 G/DL (ref 6.4–8.2)
PROT SERPL-MCNC: 7.5 G/DL (ref 6.4–8.2)
PROT SERPL-MCNC: 7.8 G/DL (ref 6.4–8.2)
PROT UR STRIP-MCNC: 100 MG/DL
PROT UR STRIP-MCNC: 30 MG/DL
PROTHROMBIN TIME: 13.8 SEC (ref 11.5–15.2)
Q-T INTERVAL, ECG07: 362 MS
Q-T INTERVAL, ECG07: 394 MS
Q-T INTERVAL, ECG07: 400 MS
Q-T INTERVAL, ECG07: 444 MS
QRS DURATION, ECG06: 110 MS
QRS DURATION, ECG06: 112 MS
QRS DURATION, ECG06: 114 MS
QRS DURATION, ECG06: 116 MS
QTC CALCULATION (BEZET), ECG08: 427 MS
QTC CALCULATION (BEZET), ECG08: 451 MS
QTC CALCULATION (BEZET), ECG08: 476 MS
QTC CALCULATION (BEZET), ECG08: 482 MS
RBC # BLD AUTO: 2.87 M/UL (ref 4.2–5.3)
RBC # BLD AUTO: 3.03 M/UL (ref 4.2–5.3)
RBC # BLD AUTO: 3.43 M/UL (ref 4.2–5.3)
RBC # BLD AUTO: 3.65 M/UL (ref 4.2–5.3)
RBC # BLD AUTO: 3.73 M/UL (ref 4.2–5.3)
RBC # BLD AUTO: 3.92 M/UL (ref 4.2–5.3)
RBC # BLD AUTO: 4.01 M/UL (ref 4.2–5.3)
RBC # BLD AUTO: 4.08 M/UL (ref 4.2–5.3)
RBC # BLD AUTO: 4.18 M/UL (ref 4.2–5.3)
RBC # BLD AUTO: 4.36 M/UL (ref 4.2–5.3)
RBC #/AREA URNS HPF: ABNORMAL /HPF (ref 0–5)
RBC MORPH BLD: ABNORMAL
RBC MORPH BLD: ABNORMAL
SERVICE CMNT-IMP: ABNORMAL
SERVICE CMNT-IMP: ABNORMAL
SODIUM SERPL-SCNC: 138 MMOL/L (ref 136–145)
SODIUM SERPL-SCNC: 138 MMOL/L (ref 136–145)
SODIUM SERPL-SCNC: 140 MMOL/L (ref 136–145)
SODIUM SERPL-SCNC: 141 MMOL/L (ref 136–145)
SODIUM SERPL-SCNC: 141 MMOL/L (ref 136–145)
SODIUM SERPL-SCNC: 142 MMOL/L (ref 136–145)
SODIUM SERPL-SCNC: 143 MMOL/L (ref 136–145)
SODIUM SERPL-SCNC: 143 MMOL/L (ref 136–145)
SODIUM SERPL-SCNC: 145 MMOL/L (ref 136–145)
SODIUM SERPL-SCNC: 145 MMOL/L (ref 136–145)
SODIUM SERPL-SCNC: 146 MMOL/L (ref 136–145)
SODIUM UR-SCNC: 26 MMOL/L (ref 20–110)
SP GR UR REFRACTOMETRY: 1.01 (ref 1–1.03)
SP GR UR REFRACTOMETRY: 1.02 (ref 1–1.03)
T4 FREE SERPL-MCNC: 1.1 NG/DL (ref 0.7–1.5)
TROPONIN I SERPL-MCNC: 0.34 NG/ML (ref 0–0.04)
TROPONIN I SERPL-MCNC: 0.38 NG/ML (ref 0–0.04)
TROPONIN I SERPL-MCNC: 0.46 NG/ML (ref 0–0.04)
TROPONIN I SERPL-MCNC: 0.48 NG/ML (ref 0–0.04)
TROPONIN I SERPL-MCNC: 0.54 NG/ML (ref 0–0.04)
TROPONIN I SERPL-MCNC: 1.57 NG/ML (ref 0–0.04)
TROPONIN I SERPL-MCNC: 1.61 NG/ML (ref 0–0.04)
TROPONIN I SERPL-MCNC: 1.92 NG/ML (ref 0–0.04)
TSH SERPL DL<=0.05 MIU/L-ACNC: 4.39 UIU/ML (ref 0.36–3.74)
UROBILINOGEN UR QL STRIP.AUTO: 0.2 EU/DL (ref 0.2–1)
VENTRICULAR RATE, ECG03: 71 BPM
VENTRICULAR RATE, ECG03: 79 BPM
VENTRICULAR RATE, ECG03: 84 BPM
VENTRICULAR RATE, ECG03: 85 BPM
WBC # BLD AUTO: 10.7 K/UL (ref 4.6–13.2)
WBC # BLD AUTO: 11.5 K/UL (ref 4.6–13.2)
WBC # BLD AUTO: 14.1 K/UL (ref 4.6–13.2)
WBC # BLD AUTO: 14.3 K/UL (ref 4.6–13.2)
WBC # BLD AUTO: 14.8 K/UL (ref 4.6–13.2)
WBC # BLD AUTO: 16.3 K/UL (ref 4.6–13.2)
WBC # BLD AUTO: 7.1 K/UL (ref 4.6–13.2)
WBC # BLD AUTO: 8.4 K/UL (ref 4.6–13.2)
WBC # BLD AUTO: 8.8 K/UL (ref 4.6–13.2)
WBC # BLD AUTO: 9.2 K/UL (ref 4.6–13.2)
WBC URNS QL MICRO: ABNORMAL /HPF (ref 0–4)
YEAST BUDDING URNS QL: POSITIVE
YEAST URNS QL MICRO: ABNORMAL
YEAST URNS QL MICRO: ABNORMAL

## 2020-01-01 PROCEDURE — 93005 ELECTROCARDIOGRAM TRACING: CPT

## 2020-01-01 PROCEDURE — 74011250637 HC RX REV CODE- 250/637: Performed by: INTERNAL MEDICINE

## 2020-01-01 PROCEDURE — 74011000250 HC RX REV CODE- 250: Performed by: NURSE PRACTITIONER

## 2020-01-01 PROCEDURE — 77030040393 HC DRSG OPTIFOAM GENT MDII -B

## 2020-01-01 PROCEDURE — 74011250636 HC RX REV CODE- 250/636: Performed by: INTERNAL MEDICINE

## 2020-01-01 PROCEDURE — 76010000131 HC OR TIME 2 TO 2.5 HR: Performed by: ORTHOPAEDIC SURGERY

## 2020-01-01 PROCEDURE — 97530 THERAPEUTIC ACTIVITIES: CPT

## 2020-01-01 PROCEDURE — 65270000029 HC RM PRIVATE

## 2020-01-01 PROCEDURE — 74011250636 HC RX REV CODE- 250/636: Performed by: FAMILY MEDICINE

## 2020-01-01 PROCEDURE — 74011250637 HC RX REV CODE- 250/637: Performed by: EMERGENCY MEDICINE

## 2020-01-01 PROCEDURE — 74011636637 HC RX REV CODE- 636/637: Performed by: INTERNAL MEDICINE

## 2020-01-01 PROCEDURE — 0SRR01Z REPLACEMENT OF RIGHT HIP JOINT, FEMORAL SURFACE WITH METAL SYNTHETIC SUBSTITUTE, OPEN APPROACH: ICD-10-PCS | Performed by: ORTHOPAEDIC SURGERY

## 2020-01-01 PROCEDURE — 74011000250 HC RX REV CODE- 250: Performed by: INTERNAL MEDICINE

## 2020-01-01 PROCEDURE — 74011250637 HC RX REV CODE- 250/637: Performed by: NURSE PRACTITIONER

## 2020-01-01 PROCEDURE — 97161 PT EVAL LOW COMPLEX 20 MIN: CPT

## 2020-01-01 PROCEDURE — 74011250636 HC RX REV CODE- 250/636: Performed by: ANESTHESIOLOGY

## 2020-01-01 PROCEDURE — 85027 COMPLETE CBC AUTOMATED: CPT

## 2020-01-01 PROCEDURE — 36415 COLL VENOUS BLD VENIPUNCTURE: CPT

## 2020-01-01 PROCEDURE — 77010033678 HC OXYGEN DAILY

## 2020-01-01 PROCEDURE — 77030008500 HC TBNG CONN PRSS BD -A: Performed by: ANESTHESIOLOGY

## 2020-01-01 PROCEDURE — 51798 US URINE CAPACITY MEASURE: CPT

## 2020-01-01 PROCEDURE — 77030031139 HC SUT VCRL2 J&J -A: Performed by: ORTHOPAEDIC SURGERY

## 2020-01-01 PROCEDURE — 85730 THROMBOPLASTIN TIME PARTIAL: CPT

## 2020-01-01 PROCEDURE — 74011000250 HC RX REV CODE- 250: Performed by: NURSE ANESTHETIST, CERTIFIED REGISTERED

## 2020-01-01 PROCEDURE — 83880 ASSAY OF NATRIURETIC PEPTIDE: CPT

## 2020-01-01 PROCEDURE — 97116 GAIT TRAINING THERAPY: CPT

## 2020-01-01 PROCEDURE — 97110 THERAPEUTIC EXERCISES: CPT

## 2020-01-01 PROCEDURE — 97165 OT EVAL LOW COMPLEX 30 MIN: CPT

## 2020-01-01 PROCEDURE — 96374 THER/PROPH/DIAG INJ IV PUSH: CPT

## 2020-01-01 PROCEDURE — 85025 COMPLETE CBC W/AUTO DIFF WBC: CPT

## 2020-01-01 PROCEDURE — 74011000258 HC RX REV CODE- 258: Performed by: INTERNAL MEDICINE

## 2020-01-01 PROCEDURE — 74011250636 HC RX REV CODE- 250/636: Performed by: ORTHOPAEDIC SURGERY

## 2020-01-01 PROCEDURE — 77030013079 HC BLNKT BAIR HGGR 3M -A: Performed by: ANESTHESIOLOGY

## 2020-01-01 PROCEDURE — 77030003029 HC SUT VCRL J&J -B: Performed by: ORTHOPAEDIC SURGERY

## 2020-01-01 PROCEDURE — 97535 SELF CARE MNGMENT TRAINING: CPT

## 2020-01-01 PROCEDURE — 93308 TTE F-UP OR LMTD: CPT

## 2020-01-01 PROCEDURE — 77030008683 HC TU ET CUF COVD -A: Performed by: ANESTHESIOLOGY

## 2020-01-01 PROCEDURE — C1776 JOINT DEVICE (IMPLANTABLE): HCPCS | Performed by: ORTHOPAEDIC SURGERY

## 2020-01-01 PROCEDURE — 80048 BASIC METABOLIC PNL TOTAL CA: CPT

## 2020-01-01 PROCEDURE — 77030006835 HC BLD SAW SAG STRY -B: Performed by: ORTHOPAEDIC SURGERY

## 2020-01-01 PROCEDURE — 87077 CULTURE AEROBIC IDENTIFY: CPT

## 2020-01-01 PROCEDURE — 77030013708 HC HNDPC SUC IRR PULS STRY –B: Performed by: ORTHOPAEDIC SURGERY

## 2020-01-01 PROCEDURE — 77030020294 HC ABD PLLW HIP DERY -B: Performed by: ORTHOPAEDIC SURGERY

## 2020-01-01 PROCEDURE — 76060000035 HC ANESTHESIA 2 TO 2.5 HR: Performed by: ORTHOPAEDIC SURGERY

## 2020-01-01 PROCEDURE — 77030002933 HC SUT MCRYL J&J -A: Performed by: ORTHOPAEDIC SURGERY

## 2020-01-01 PROCEDURE — 70450 CT HEAD/BRAIN W/O DYE: CPT

## 2020-01-01 PROCEDURE — 80069 RENAL FUNCTION PANEL: CPT

## 2020-01-01 PROCEDURE — 74011250636 HC RX REV CODE- 250/636: Performed by: NURSE PRACTITIONER

## 2020-01-01 PROCEDURE — 77030018836 HC SOL IRR NACL ICUM -A: Performed by: ORTHOPAEDIC SURGERY

## 2020-01-01 PROCEDURE — 81001 URINALYSIS AUTO W/SCOPE: CPT

## 2020-01-01 PROCEDURE — 87086 URINE CULTURE/COLONY COUNT: CPT

## 2020-01-01 PROCEDURE — 77030011265 HC ELECTRD BLD HEX COVD -A: Performed by: ORTHOPAEDIC SURGERY

## 2020-01-01 PROCEDURE — 65660000000 HC RM CCU STEPDOWN

## 2020-01-01 PROCEDURE — 77030041247 HC PROTECTOR HEEL HEELMEDIX MDII -B

## 2020-01-01 PROCEDURE — 82962 GLUCOSE BLOOD TEST: CPT

## 2020-01-01 PROCEDURE — 74011000250 HC RX REV CODE- 250: Performed by: EMERGENCY MEDICINE

## 2020-01-01 PROCEDURE — 99285 EMERGENCY DEPT VISIT HI MDM: CPT

## 2020-01-01 PROCEDURE — 77030019905 HC CATH URETH INTMIT MDII -A

## 2020-01-01 PROCEDURE — 74011250636 HC RX REV CODE- 250/636: Performed by: NURSE ANESTHETIST, CERTIFIED REGISTERED

## 2020-01-01 PROCEDURE — 64450 NJX AA&/STRD OTHER PN/BRANCH: CPT

## 2020-01-01 PROCEDURE — 83735 ASSAY OF MAGNESIUM: CPT

## 2020-01-01 PROCEDURE — 82550 ASSAY OF CK (CPK): CPT

## 2020-01-01 PROCEDURE — 84484 ASSAY OF TROPONIN QUANT: CPT

## 2020-01-01 PROCEDURE — 77030040922 HC BLNKT HYPOTHRM STRY -A: Performed by: ORTHOPAEDIC SURGERY

## 2020-01-01 PROCEDURE — 74011250637 HC RX REV CODE- 250/637: Performed by: HOSPITALIST

## 2020-01-01 PROCEDURE — 71045 X-RAY EXAM CHEST 1 VIEW: CPT

## 2020-01-01 PROCEDURE — 77030005401 HC CATH RAD ARRO -A: Performed by: ANESTHESIOLOGY

## 2020-01-01 PROCEDURE — 74011000250 HC RX REV CODE- 250: Performed by: ORTHOPAEDIC SURGERY

## 2020-01-01 PROCEDURE — 74011250636 HC RX REV CODE- 250/636: Performed by: EMERGENCY MEDICINE

## 2020-01-01 PROCEDURE — 87186 SC STD MICRODIL/AGAR DIL: CPT

## 2020-01-01 PROCEDURE — 85610 PROTHROMBIN TIME: CPT

## 2020-01-01 PROCEDURE — 80053 COMPREHEN METABOLIC PANEL: CPT

## 2020-01-01 PROCEDURE — 84132 ASSAY OF SERUM POTASSIUM: CPT

## 2020-01-01 PROCEDURE — 73502 X-RAY EXAM HIP UNI 2-3 VIEWS: CPT

## 2020-01-01 PROCEDURE — 84100 ASSAY OF PHOSPHORUS: CPT

## 2020-01-01 PROCEDURE — 87106 FUNGI IDENTIFICATION YEAST: CPT

## 2020-01-01 PROCEDURE — 3E0T3BZ INTRODUCTION OF ANESTHETIC AGENT INTO PERIPHERAL NERVES AND PLEXI, PERCUTANEOUS APPROACH: ICD-10-PCS | Performed by: ANESTHESIOLOGY

## 2020-01-01 PROCEDURE — 76770 US EXAM ABDO BACK WALL COMP: CPT

## 2020-01-01 PROCEDURE — 99284 EMERGENCY DEPT VISIT MOD MDM: CPT

## 2020-01-01 PROCEDURE — 94640 AIRWAY INHALATION TREATMENT: CPT

## 2020-01-01 PROCEDURE — 82570 ASSAY OF URINE CREATININE: CPT

## 2020-01-01 PROCEDURE — 84439 ASSAY OF FREE THYROXINE: CPT

## 2020-01-01 PROCEDURE — 51702 INSERT TEMP BLADDER CATH: CPT

## 2020-01-01 PROCEDURE — 77030027138 HC INCENT SPIROMETER -A

## 2020-01-01 PROCEDURE — 84300 ASSAY OF URINE SODIUM: CPT

## 2020-01-01 PROCEDURE — 76210000006 HC OR PH I REC 0.5 TO 1 HR: Performed by: ORTHOPAEDIC SURGERY

## 2020-01-01 PROCEDURE — 77030008462 HC STPLR SKN PROX J&J -A: Performed by: ORTHOPAEDIC SURGERY

## 2020-01-01 PROCEDURE — 77030040830 HC CATH URETH FOL MDII -A

## 2020-01-01 PROCEDURE — 84443 ASSAY THYROID STIM HORMONE: CPT

## 2020-01-01 PROCEDURE — 77030038269 HC DRN EXT URIN PURWCK BARD -A

## 2020-01-01 PROCEDURE — 97162 PT EVAL MOD COMPLEX 30 MIN: CPT

## 2020-01-01 PROCEDURE — 77030013797 HC KT TRNSDUC PRSSR EDWD -A: Performed by: ANESTHESIOLOGY

## 2020-01-01 PROCEDURE — 92526 ORAL FUNCTION THERAPY: CPT

## 2020-01-01 PROCEDURE — 96375 TX/PRO/DX INJ NEW DRUG ADDON: CPT

## 2020-01-01 PROCEDURE — 77030020274 HC MISC IMPL ORTHOPEDIC: Performed by: ORTHOPAEDIC SURGERY

## 2020-01-01 DEVICE — HEAD FEM DIA28MM +5MM OFFSET STD 12/14 TAPR ARTC EZ HIP MTL: Type: IMPLANTABLE DEVICE | Site: HIP | Status: FUNCTIONAL

## 2020-01-01 DEVICE — STEM FEM CEM HIP UPLR PART POROUS HA SUMMIT: Type: IMPLANTABLE DEVICE | Site: HIP | Status: FUNCTIONAL

## 2020-01-01 DEVICE — HEAD BPLR OD43MM ID28MM FEM HIP GRY POS ECC SELF CNTR: Type: IMPLANTABLE DEVICE | Site: HIP | Status: FUNCTIONAL

## 2020-01-01 DEVICE — STEM FEM CEM HIP UPLR HD PART POROUS HA SUMMIT: Type: IMPLANTABLE DEVICE | Site: HIP | Status: FUNCTIONAL

## 2020-01-01 RX ORDER — ONDANSETRON 2 MG/ML
4 INJECTION INTRAMUSCULAR; INTRAVENOUS
Status: COMPLETED | OUTPATIENT
Start: 2020-01-01 | End: 2020-01-01

## 2020-01-01 RX ORDER — HALOPERIDOL 5 MG/ML
4 INJECTION INTRAMUSCULAR ONCE
Status: COMPLETED | OUTPATIENT
Start: 2020-01-01 | End: 2020-01-01

## 2020-01-01 RX ORDER — CLONIDINE HYDROCHLORIDE 0.1 MG/1
0.2 TABLET ORAL
Status: COMPLETED | OUTPATIENT
Start: 2020-01-01 | End: 2020-01-01

## 2020-01-01 RX ORDER — TRAMADOL HYDROCHLORIDE 50 MG/1
25 TABLET ORAL
Qty: 10 TAB | Refills: 0 | Status: SHIPPED | OUTPATIENT
Start: 2020-01-01 | End: 2020-01-01

## 2020-01-01 RX ORDER — ACETAMINOPHEN 650 MG/1
650 SUPPOSITORY RECTAL
Status: DISCONTINUED | OUTPATIENT
Start: 2020-01-01 | End: 2020-01-01 | Stop reason: HOSPADM

## 2020-01-01 RX ORDER — HYDRALAZINE HYDROCHLORIDE 25 MG/1
25 TABLET, FILM COATED ORAL 3 TIMES DAILY
Status: DISCONTINUED | OUTPATIENT
Start: 2020-01-01 | End: 2020-01-01

## 2020-01-01 RX ORDER — SODIUM CHLORIDE 0.9 % (FLUSH) 0.9 %
5-40 SYRINGE (ML) INJECTION EVERY 8 HOURS
Status: DISCONTINUED | OUTPATIENT
Start: 2020-01-01 | End: 2020-01-01 | Stop reason: HOSPADM

## 2020-01-01 RX ORDER — PHENOL/SODIUM PHENOLATE
20 AEROSOL, SPRAY (ML) MUCOUS MEMBRANE DAILY
COMMUNITY

## 2020-01-01 RX ORDER — DEXTROSE 50 % IN WATER (D50W) INTRAVENOUS SYRINGE
25-50 AS NEEDED
Status: DISCONTINUED | OUTPATIENT
Start: 2020-01-01 | End: 2020-01-01 | Stop reason: HOSPADM

## 2020-01-01 RX ORDER — FUROSEMIDE 10 MG/ML
20 INJECTION INTRAMUSCULAR; INTRAVENOUS 2 TIMES DAILY
Status: DISCONTINUED | OUTPATIENT
Start: 2020-01-01 | End: 2020-01-01

## 2020-01-01 RX ORDER — ACETAMINOPHEN/DEXTROMETHORPHAN 1000-30/30
LIQUID (ML) ORAL
COMMUNITY

## 2020-01-01 RX ORDER — FENTANYL CITRATE 50 UG/ML
INJECTION, SOLUTION INTRAMUSCULAR; INTRAVENOUS AS NEEDED
Status: DISCONTINUED | OUTPATIENT
Start: 2020-01-01 | End: 2020-01-01 | Stop reason: HOSPADM

## 2020-01-01 RX ORDER — LORAZEPAM 2 MG/ML
1 INJECTION INTRAMUSCULAR ONCE
Status: COMPLETED | OUTPATIENT
Start: 2020-01-01 | End: 2020-01-01

## 2020-01-01 RX ORDER — SODIUM BICARBONATE 84 MG/ML
50 INJECTION, SOLUTION INTRAVENOUS ONCE
Status: COMPLETED | OUTPATIENT
Start: 2020-01-01 | End: 2020-01-01

## 2020-01-01 RX ORDER — INSULIN LISPRO 100 [IU]/ML
INJECTION, SOLUTION INTRAVENOUS; SUBCUTANEOUS ONCE
Status: DISCONTINUED | OUTPATIENT
Start: 2020-01-01 | End: 2020-01-01 | Stop reason: HOSPADM

## 2020-01-01 RX ORDER — GUAIFENESIN 100 MG/5ML
81 LIQUID (ML) ORAL DAILY
Status: DISCONTINUED | OUTPATIENT
Start: 2020-01-01 | End: 2020-01-01 | Stop reason: HOSPADM

## 2020-01-01 RX ORDER — VASOPRESSIN 20 U/ML
INJECTION PARENTERAL AS NEEDED
Status: DISCONTINUED | OUTPATIENT
Start: 2020-01-01 | End: 2020-01-01 | Stop reason: HOSPADM

## 2020-01-01 RX ORDER — HALOPERIDOL 2 MG/ML
2 SOLUTION ORAL
Status: DISCONTINUED | OUTPATIENT
Start: 2020-01-01 | End: 2020-01-01 | Stop reason: HOSPADM

## 2020-01-01 RX ORDER — MORPHINE SULFATE 4 MG/ML
4 INJECTION, SOLUTION INTRAMUSCULAR; INTRAVENOUS
Status: COMPLETED | OUTPATIENT
Start: 2020-01-01 | End: 2020-01-01

## 2020-01-01 RX ORDER — LOSARTAN POTASSIUM 100 MG/1
100 TABLET ORAL DAILY
COMMUNITY
End: 2020-01-01

## 2020-01-01 RX ORDER — HYDRALAZINE HYDROCHLORIDE 50 MG/1
50 TABLET, FILM COATED ORAL 3 TIMES DAILY
Status: DISCONTINUED | OUTPATIENT
Start: 2020-01-01 | End: 2020-01-01

## 2020-01-01 RX ORDER — ALLOPURINOL 100 MG/1
100 TABLET ORAL
Status: DISCONTINUED | OUTPATIENT
Start: 2020-01-01 | End: 2020-01-01 | Stop reason: HOSPADM

## 2020-01-01 RX ORDER — TAMSULOSIN HYDROCHLORIDE 0.4 MG/1
0.4 CAPSULE ORAL DAILY
Status: DISCONTINUED | OUTPATIENT
Start: 2020-01-01 | End: 2020-01-01 | Stop reason: HOSPADM

## 2020-01-01 RX ORDER — QUETIAPINE FUMARATE 25 MG/1
25 TABLET, FILM COATED ORAL 2 TIMES DAILY
Status: DISCONTINUED | OUTPATIENT
Start: 2020-01-01 | End: 2020-01-01

## 2020-01-01 RX ORDER — GUAIFENESIN 600 MG/1
600 TABLET, EXTENDED RELEASE ORAL EVERY 12 HOURS
Qty: 30 TAB | Refills: 1 | Status: SHIPPED | OUTPATIENT
Start: 2020-01-01 | End: 2020-01-01

## 2020-01-01 RX ORDER — SODIUM CHLORIDE 0.9 % (FLUSH) 0.9 %
5-40 SYRINGE (ML) INJECTION AS NEEDED
Status: DISCONTINUED | OUTPATIENT
Start: 2020-01-01 | End: 2020-01-01 | Stop reason: HOSPADM

## 2020-01-01 RX ORDER — CARVEDILOL 6.25 MG/1
6.25 TABLET ORAL 2 TIMES DAILY WITH MEALS
Qty: 60 TAB | Refills: 0 | Status: SHIPPED
Start: 2020-01-01 | End: 2020-01-01 | Stop reason: ALTCHOICE

## 2020-01-01 RX ORDER — ROPIVACAINE HYDROCHLORIDE 2 MG/ML
INJECTION, SOLUTION EPIDURAL; INFILTRATION; PERINEURAL
Status: COMPLETED | OUTPATIENT
Start: 2020-01-01 | End: 2020-01-01

## 2020-01-01 RX ORDER — HYDROCODONE BITARTRATE AND ACETAMINOPHEN 5; 325 MG/1; MG/1
1 TABLET ORAL EVERY EVENING
Status: DISCONTINUED | OUTPATIENT
Start: 2020-01-01 | End: 2020-01-01 | Stop reason: HOSPADM

## 2020-01-01 RX ORDER — QUETIAPINE FUMARATE 25 MG/1
25 TABLET, FILM COATED ORAL
COMMUNITY

## 2020-01-01 RX ORDER — GLYCOPYRROLATE 0.2 MG/ML
INJECTION INTRAMUSCULAR; INTRAVENOUS AS NEEDED
Status: DISCONTINUED | OUTPATIENT
Start: 2020-01-01 | End: 2020-01-01 | Stop reason: HOSPADM

## 2020-01-01 RX ORDER — LORAZEPAM 2 MG/ML
0.5 INJECTION INTRAMUSCULAR ONCE
Status: COMPLETED | OUTPATIENT
Start: 2020-01-01 | End: 2020-01-01

## 2020-01-01 RX ORDER — FUROSEMIDE 20 MG/1
20 TABLET ORAL DAILY
Status: DISCONTINUED | OUTPATIENT
Start: 2020-01-01 | End: 2020-01-01 | Stop reason: HOSPADM

## 2020-01-01 RX ORDER — CLONIDINE HYDROCHLORIDE 0.1 MG/1
0.2 TABLET ORAL 2 TIMES DAILY
Status: DISCONTINUED | OUTPATIENT
Start: 2020-01-01 | End: 2020-01-01 | Stop reason: HOSPADM

## 2020-01-01 RX ORDER — ONDANSETRON 2 MG/ML
INJECTION INTRAMUSCULAR; INTRAVENOUS AS NEEDED
Status: DISCONTINUED | OUTPATIENT
Start: 2020-01-01 | End: 2020-01-01 | Stop reason: HOSPADM

## 2020-01-01 RX ORDER — ATORVASTATIN CALCIUM 20 MG/1
20 TABLET, FILM COATED ORAL EVERY EVENING
Status: DISCONTINUED | OUTPATIENT
Start: 2020-01-01 | End: 2020-01-01 | Stop reason: HOSPADM

## 2020-01-01 RX ORDER — ALBUTEROL SULFATE 1.25 MG/3ML
1.25 SOLUTION RESPIRATORY (INHALATION)
Status: COMPLETED | OUTPATIENT
Start: 2020-01-01 | End: 2020-01-01

## 2020-01-01 RX ORDER — ATORVASTATIN CALCIUM 20 MG/1
20 TABLET, FILM COATED ORAL DAILY
Status: DISCONTINUED | OUTPATIENT
Start: 2020-01-01 | End: 2020-01-01 | Stop reason: HOSPADM

## 2020-01-01 RX ORDER — MORPHINE SULFATE 2 MG/ML
2 INJECTION, SOLUTION INTRAMUSCULAR; INTRAVENOUS
Status: COMPLETED | OUTPATIENT
Start: 2020-01-01 | End: 2020-01-01

## 2020-01-01 RX ORDER — TRAMADOL HYDROCHLORIDE 50 MG/1
25 TABLET ORAL
Status: DISCONTINUED | OUTPATIENT
Start: 2020-01-01 | End: 2020-01-01

## 2020-01-01 RX ORDER — CEFAZOLIN SODIUM 2 G/50ML
2 SOLUTION INTRAVENOUS ONCE
Status: COMPLETED | OUTPATIENT
Start: 2020-01-01 | End: 2020-01-01

## 2020-01-01 RX ORDER — ALBUTEROL SULFATE 1.25 MG/3ML
1.25 SOLUTION RESPIRATORY (INHALATION)
Status: DISCONTINUED | OUTPATIENT
Start: 2020-01-01 | End: 2020-01-01 | Stop reason: HOSPADM

## 2020-01-01 RX ORDER — SODIUM POLYSTYRENE SULFONATE 15 G/60ML
30 SUSPENSION ORAL; RECTAL
Status: COMPLETED | OUTPATIENT
Start: 2020-01-01 | End: 2020-01-01

## 2020-01-01 RX ORDER — METOPROLOL TARTRATE 25 MG/1
25 TABLET, FILM COATED ORAL EVERY 12 HOURS
Status: DISCONTINUED | OUTPATIENT
Start: 2020-01-01 | End: 2020-01-01 | Stop reason: HOSPADM

## 2020-01-01 RX ORDER — SODIUM CHLORIDE, SODIUM LACTATE, POTASSIUM CHLORIDE, CALCIUM CHLORIDE 600; 310; 30; 20 MG/100ML; MG/100ML; MG/100ML; MG/100ML
75 INJECTION, SOLUTION INTRAVENOUS CONTINUOUS
Status: DISCONTINUED | OUTPATIENT
Start: 2020-01-01 | End: 2020-01-01 | Stop reason: HOSPADM

## 2020-01-01 RX ORDER — ZOLPIDEM TARTRATE 10 MG/1
TABLET ORAL
COMMUNITY

## 2020-01-01 RX ORDER — MORPHINE SULFATE 2 MG/ML
1 INJECTION, SOLUTION INTRAMUSCULAR; INTRAVENOUS
Status: DISCONTINUED | OUTPATIENT
Start: 2020-01-01 | End: 2020-01-01

## 2020-01-01 RX ORDER — CALCIUM GLUCONATE 94 MG/ML
1 INJECTION, SOLUTION INTRAVENOUS ONCE
Status: COMPLETED | OUTPATIENT
Start: 2020-01-01 | End: 2020-01-01

## 2020-01-01 RX ORDER — LORATADINE 10 MG/1
10 TABLET ORAL
Status: DISCONTINUED | OUTPATIENT
Start: 2020-01-01 | End: 2020-01-01 | Stop reason: HOSPADM

## 2020-01-01 RX ORDER — HEPARIN SODIUM 1000 [USP'U]/ML
60 INJECTION, SOLUTION INTRAVENOUS; SUBCUTANEOUS ONCE
Status: COMPLETED | OUTPATIENT
Start: 2020-01-01 | End: 2020-01-01

## 2020-01-01 RX ORDER — BACITRACIN 50000 [IU]/1
INJECTION, POWDER, FOR SOLUTION INTRAMUSCULAR AS NEEDED
Status: DISCONTINUED | OUTPATIENT
Start: 2020-01-01 | End: 2020-01-01 | Stop reason: HOSPADM

## 2020-01-01 RX ORDER — FAMOTIDINE 20 MG/1
20 TABLET, FILM COATED ORAL 2 TIMES DAILY
Status: DISCONTINUED | OUTPATIENT
Start: 2020-01-01 | End: 2020-01-01

## 2020-01-01 RX ORDER — AMLODIPINE BESYLATE 5 MG/1
5 TABLET ORAL
Status: DISCONTINUED | OUTPATIENT
Start: 2020-01-01 | End: 2020-01-01 | Stop reason: HOSPADM

## 2020-01-01 RX ORDER — AMLODIPINE BESYLATE 5 MG/1
5 TABLET ORAL DAILY
Status: DISCONTINUED | OUTPATIENT
Start: 2020-01-01 | End: 2020-01-01

## 2020-01-01 RX ORDER — SODIUM POLYSTYRENE SULFONATE 15 G/60ML
15 SUSPENSION ORAL; RECTAL
Status: COMPLETED | OUTPATIENT
Start: 2020-01-01 | End: 2020-01-01

## 2020-01-01 RX ORDER — DEXTROSE 50 % IN WATER (D50W) INTRAVENOUS SYRINGE
25 ONCE
Status: COMPLETED | OUTPATIENT
Start: 2020-01-01 | End: 2020-01-01

## 2020-01-01 RX ORDER — GUAIFENESIN 600 MG/1
600 TABLET, EXTENDED RELEASE ORAL EVERY 12 HOURS
Status: DISCONTINUED | OUTPATIENT
Start: 2020-01-01 | End: 2020-01-01 | Stop reason: HOSPADM

## 2020-01-01 RX ORDER — HYDROMORPHONE HYDROCHLORIDE 2 MG/ML
0.5 INJECTION, SOLUTION INTRAMUSCULAR; INTRAVENOUS; SUBCUTANEOUS
Status: DISCONTINUED | OUTPATIENT
Start: 2020-01-01 | End: 2020-01-01 | Stop reason: HOSPADM

## 2020-01-01 RX ORDER — MAGNESIUM SULFATE 100 %
4 CRYSTALS MISCELLANEOUS AS NEEDED
Status: DISCONTINUED | OUTPATIENT
Start: 2020-01-01 | End: 2020-01-01 | Stop reason: HOSPADM

## 2020-01-01 RX ORDER — ALBUTEROL SULFATE 2.5 MG/.5ML
10 SOLUTION RESPIRATORY (INHALATION)
Status: COMPLETED | OUTPATIENT
Start: 2020-01-01 | End: 2020-01-01

## 2020-01-01 RX ORDER — FELODIPINE 5 MG/1
10 TABLET, EXTENDED RELEASE ORAL DAILY
Status: DISCONTINUED | OUTPATIENT
Start: 2020-01-01 | End: 2020-01-01 | Stop reason: HOSPADM

## 2020-01-01 RX ORDER — DOCUSATE SODIUM 100 MG/1
100 CAPSULE, LIQUID FILLED ORAL 2 TIMES DAILY
Qty: 60 CAP | Refills: 0 | Status: SHIPPED
Start: 2020-01-01 | End: 2020-01-01

## 2020-01-01 RX ORDER — CARVEDILOL 3.12 MG/1
3.12 TABLET ORAL 2 TIMES DAILY WITH MEALS
Status: DISCONTINUED | OUTPATIENT
Start: 2020-01-01 | End: 2020-01-01

## 2020-01-01 RX ORDER — TAMSULOSIN HYDROCHLORIDE 0.4 MG/1
0.4 CAPSULE ORAL DAILY
Qty: 30 CAP | Refills: 0 | Status: SHIPPED
Start: 2020-01-01 | End: 2020-01-01

## 2020-01-01 RX ORDER — MEMANTINE HYDROCHLORIDE 21 MG/1
21 CAPSULE, EXTENDED RELEASE ORAL DAILY
Status: DISCONTINUED | OUTPATIENT
Start: 2020-01-01 | End: 2020-01-01 | Stop reason: HOSPADM

## 2020-01-01 RX ORDER — ZOLPIDEM TARTRATE 5 MG/1
10 TABLET ORAL
Status: DISCONTINUED | OUTPATIENT
Start: 2020-01-01 | End: 2020-01-01 | Stop reason: HOSPADM

## 2020-01-01 RX ORDER — HEPARIN SODIUM 10000 [USP'U]/100ML
12-25 INJECTION, SOLUTION INTRAVENOUS
Status: DISCONTINUED | OUTPATIENT
Start: 2020-01-01 | End: 2020-01-01 | Stop reason: HOSPADM

## 2020-01-01 RX ORDER — FUROSEMIDE 20 MG/1
20 TABLET ORAL DAILY
COMMUNITY

## 2020-01-01 RX ORDER — TRAMADOL HYDROCHLORIDE 50 MG/1
25 TABLET ORAL
Status: DISCONTINUED | OUTPATIENT
Start: 2020-01-01 | End: 2020-01-01 | Stop reason: HOSPADM

## 2020-01-01 RX ORDER — IPRATROPIUM BROMIDE AND ALBUTEROL SULFATE 2.5; .5 MG/3ML; MG/3ML
3 SOLUTION RESPIRATORY (INHALATION)
Status: COMPLETED | OUTPATIENT
Start: 2020-01-01 | End: 2020-01-01

## 2020-01-01 RX ORDER — DEXAMETHASONE SODIUM PHOSPHATE 4 MG/ML
INJECTION, SOLUTION INTRA-ARTICULAR; INTRALESIONAL; INTRAMUSCULAR; INTRAVENOUS; SOFT TISSUE
Status: COMPLETED | OUTPATIENT
Start: 2020-01-01 | End: 2020-01-01

## 2020-01-01 RX ORDER — ROCURONIUM BROMIDE 10 MG/ML
INJECTION, SOLUTION INTRAVENOUS AS NEEDED
Status: DISCONTINUED | OUTPATIENT
Start: 2020-01-01 | End: 2020-01-01 | Stop reason: HOSPADM

## 2020-01-01 RX ORDER — HALOPERIDOL 2 MG/ML
2 SOLUTION ORAL
Qty: 30 ML | Refills: 1 | Status: SHIPPED | OUTPATIENT
Start: 2020-01-01 | End: 2020-01-01 | Stop reason: ALTCHOICE

## 2020-01-01 RX ORDER — ACETAMINOPHEN 325 MG/1
650 TABLET ORAL
Status: DISCONTINUED | OUTPATIENT
Start: 2020-01-01 | End: 2020-01-01 | Stop reason: HOSPADM

## 2020-01-01 RX ORDER — ONDANSETRON 2 MG/ML
4 INJECTION INTRAMUSCULAR; INTRAVENOUS ONCE
Status: DISCONTINUED | OUTPATIENT
Start: 2020-01-01 | End: 2020-01-01 | Stop reason: HOSPADM

## 2020-01-01 RX ORDER — DOCUSATE SODIUM 100 MG/1
100 CAPSULE, LIQUID FILLED ORAL 2 TIMES DAILY
Status: DISCONTINUED | OUTPATIENT
Start: 2020-01-01 | End: 2020-01-01 | Stop reason: HOSPADM

## 2020-01-01 RX ORDER — ASPIRIN 81 MG/1
81 TABLET ORAL DAILY
Status: DISCONTINUED | OUTPATIENT
Start: 2020-01-01 | End: 2020-01-01 | Stop reason: HOSPADM

## 2020-01-01 RX ORDER — DEXTROSE MONOHYDRATE AND SODIUM CHLORIDE 5; .45 G/100ML; G/100ML
30 INJECTION, SOLUTION INTRAVENOUS CONTINUOUS
Status: DISCONTINUED | OUTPATIENT
Start: 2020-01-01 | End: 2020-01-01 | Stop reason: HOSPADM

## 2020-01-01 RX ORDER — SODIUM POLYSTYRENE SULFONATE 15 G/60ML
50 SUSPENSION ORAL; RECTAL
Status: COMPLETED | OUTPATIENT
Start: 2020-01-01 | End: 2020-01-01

## 2020-01-01 RX ORDER — DEXTROSE 50 % IN WATER (D50W) INTRAVENOUS SYRINGE
50
Status: COMPLETED | OUTPATIENT
Start: 2020-01-01 | End: 2020-01-01

## 2020-01-01 RX ORDER — FAMOTIDINE 20 MG/1
20 TABLET, FILM COATED ORAL DAILY
Status: DISCONTINUED | OUTPATIENT
Start: 2020-01-01 | End: 2020-01-01 | Stop reason: HOSPADM

## 2020-01-01 RX ORDER — CEFAZOLIN SODIUM 2 G/50ML
2 SOLUTION INTRAVENOUS EVERY 8 HOURS
Status: COMPLETED | OUTPATIENT
Start: 2020-01-01 | End: 2020-01-01

## 2020-01-01 RX ORDER — CARVEDILOL 6.25 MG/1
6.25 TABLET ORAL 2 TIMES DAILY WITH MEALS
Status: DISCONTINUED | OUTPATIENT
Start: 2020-01-01 | End: 2020-01-01 | Stop reason: HOSPADM

## 2020-01-01 RX ORDER — PANTOPRAZOLE SODIUM 40 MG/1
40 TABLET, DELAYED RELEASE ORAL
Status: DISCONTINUED | OUTPATIENT
Start: 2020-01-01 | End: 2020-01-01 | Stop reason: HOSPADM

## 2020-01-01 RX ORDER — AMLODIPINE BESYLATE 5 MG/1
5 TABLET ORAL DAILY
COMMUNITY

## 2020-01-01 RX ORDER — NALBUPHINE HYDROCHLORIDE 10 MG/ML
5 INJECTION, SOLUTION INTRAMUSCULAR; INTRAVENOUS; SUBCUTANEOUS
Status: DISCONTINUED | OUTPATIENT
Start: 2020-01-01 | End: 2020-01-01 | Stop reason: HOSPADM

## 2020-01-01 RX ORDER — CLONIDINE HYDROCHLORIDE 0.1 MG/1
0.2 TABLET ORAL 2 TIMES DAILY
Status: DISCONTINUED | OUTPATIENT
Start: 2020-01-01 | End: 2020-01-01

## 2020-01-01 RX ORDER — FUROSEMIDE 10 MG/ML
20 INJECTION INTRAMUSCULAR; INTRAVENOUS ONCE
Status: COMPLETED | OUTPATIENT
Start: 2020-01-01 | End: 2020-01-01

## 2020-01-01 RX ORDER — PROPOFOL 10 MG/ML
INJECTION, EMULSION INTRAVENOUS AS NEEDED
Status: DISCONTINUED | OUTPATIENT
Start: 2020-01-01 | End: 2020-01-01 | Stop reason: HOSPADM

## 2020-01-01 RX ORDER — HALOPERIDOL 5 MG/ML
3 INJECTION INTRAMUSCULAR
Status: DISCONTINUED | OUTPATIENT
Start: 2020-01-01 | End: 2020-01-01 | Stop reason: HOSPADM

## 2020-01-01 RX ORDER — SODIUM CHLORIDE 9 MG/ML
250 INJECTION, SOLUTION INTRAVENOUS CONTINUOUS
Status: DISPENSED | OUTPATIENT
Start: 2020-01-01 | End: 2020-01-01

## 2020-01-01 RX ORDER — HYDROCODONE BITARTRATE AND ACETAMINOPHEN 5; 325 MG/1; MG/1
1 TABLET ORAL
COMMUNITY

## 2020-01-01 RX ORDER — CLONIDINE HYDROCHLORIDE 0.1 MG/1
0.1 TABLET ORAL ONCE
Status: COMPLETED | OUTPATIENT
Start: 2020-01-01 | End: 2020-01-01

## 2020-01-01 RX ORDER — FUROSEMIDE 20 MG/1
20 TABLET ORAL DAILY
Qty: 30 TAB | Refills: 2 | Status: SHIPPED | OUTPATIENT
Start: 2020-01-01 | End: 2020-01-01

## 2020-01-01 RX ORDER — EPINEPHRINE 0.1 MG/ML
INJECTION INTRACARDIAC; INTRAVENOUS AS NEEDED
Status: DISCONTINUED | OUTPATIENT
Start: 2020-01-01 | End: 2020-01-01 | Stop reason: HOSPADM

## 2020-01-01 RX ORDER — LANOLIN ALCOHOL/MO/W.PET/CERES
CREAM (GRAM) TOPICAL
Status: DISCONTINUED | OUTPATIENT
Start: 2020-01-01 | End: 2020-01-01 | Stop reason: HOSPADM

## 2020-01-01 RX ORDER — HEPARIN SODIUM 5000 [USP'U]/ML
5000 INJECTION, SOLUTION INTRAVENOUS; SUBCUTANEOUS EVERY 8 HOURS
Qty: 78 ML | Refills: 0 | Status: SHIPPED
Start: 2020-01-01 | End: 2020-01-01

## 2020-01-01 RX ORDER — FUROSEMIDE 10 MG/ML
20 INJECTION INTRAMUSCULAR; INTRAVENOUS DAILY
Status: DISCONTINUED | OUTPATIENT
Start: 2020-01-01 | End: 2020-01-01 | Stop reason: HOSPADM

## 2020-01-01 RX ORDER — QUETIAPINE FUMARATE 25 MG/1
25 TABLET, FILM COATED ORAL
Status: DISCONTINUED | OUTPATIENT
Start: 2020-01-01 | End: 2020-01-01 | Stop reason: HOSPADM

## 2020-01-01 RX ORDER — HALOPERIDOL 2 MG/1
2 TABLET ORAL
Qty: 30 TAB | Refills: 1 | Status: SHIPPED | OUTPATIENT
Start: 2020-01-01 | End: 2020-01-01

## 2020-01-01 RX ORDER — THERA TABS 400 MCG
1 TAB ORAL DAILY
Status: DISCONTINUED | OUTPATIENT
Start: 2020-01-01 | End: 2020-01-01 | Stop reason: HOSPADM

## 2020-01-01 RX ORDER — SODIUM CHLORIDE 9 MG/ML
50 INJECTION, SOLUTION INTRAVENOUS CONTINUOUS
Status: DISCONTINUED | OUTPATIENT
Start: 2020-01-01 | End: 2020-01-01

## 2020-01-01 RX ORDER — QUETIAPINE FUMARATE 25 MG/1
25 TABLET, FILM COATED ORAL
Status: DISCONTINUED | OUTPATIENT
Start: 2020-01-01 | End: 2020-01-01

## 2020-01-01 RX ORDER — CLONIDINE HYDROCHLORIDE 0.1 MG/1
0.1 TABLET ORAL
Status: COMPLETED | OUTPATIENT
Start: 2020-01-01 | End: 2020-01-01

## 2020-01-01 RX ORDER — ZOLPIDEM TARTRATE 5 MG/1
10 TABLET ORAL
Status: DISCONTINUED | OUTPATIENT
Start: 2020-01-01 | End: 2020-01-01

## 2020-01-01 RX ORDER — CLOPIDOGREL BISULFATE 75 MG/1
75 TABLET ORAL DAILY
Status: DISCONTINUED | OUTPATIENT
Start: 2020-01-01 | End: 2020-01-01 | Stop reason: HOSPADM

## 2020-01-01 RX ORDER — LORAZEPAM 2 MG/ML
0.5 INJECTION INTRAMUSCULAR
Status: DISCONTINUED | OUTPATIENT
Start: 2020-01-01 | End: 2020-01-01

## 2020-01-01 RX ORDER — HEPARIN SODIUM 5000 [USP'U]/ML
5000 INJECTION, SOLUTION INTRAVENOUS; SUBCUTANEOUS EVERY 8 HOURS
Status: DISCONTINUED | OUTPATIENT
Start: 2020-01-01 | End: 2020-01-01 | Stop reason: HOSPADM

## 2020-01-01 RX ORDER — FUROSEMIDE 10 MG/ML
40 INJECTION INTRAMUSCULAR; INTRAVENOUS
Status: COMPLETED | OUTPATIENT
Start: 2020-01-01 | End: 2020-01-01

## 2020-01-01 RX ORDER — BUPIVACAINE HYDROCHLORIDE 5 MG/ML
INJECTION, SOLUTION EPIDURAL; INTRACAUDAL AS NEEDED
Status: DISCONTINUED | OUTPATIENT
Start: 2020-01-01 | End: 2020-01-01 | Stop reason: HOSPADM

## 2020-01-01 RX ORDER — DIPHENHYDRAMINE HYDROCHLORIDE 50 MG/ML
12.5 INJECTION, SOLUTION INTRAMUSCULAR; INTRAVENOUS
Status: DISCONTINUED | OUTPATIENT
Start: 2020-01-01 | End: 2020-01-01 | Stop reason: HOSPADM

## 2020-01-01 RX ADMIN — FELODIPINE 10 MG: 5 TABLET, EXTENDED RELEASE ORAL at 08:30

## 2020-01-01 RX ADMIN — TAMSULOSIN HYDROCHLORIDE 0.4 MG: 0.4 CAPSULE ORAL at 09:13

## 2020-01-01 RX ADMIN — HEPARIN SODIUM 5000 UNITS: 5000 INJECTION INTRAVENOUS; SUBCUTANEOUS at 21:17

## 2020-01-01 RX ADMIN — LORAZEPAM 1 MG: 2 INJECTION INTRAMUSCULAR at 20:53

## 2020-01-01 RX ADMIN — LORAZEPAM 1 MG: 2 INJECTION INTRAMUSCULAR; INTRAVENOUS at 09:28

## 2020-01-01 RX ADMIN — CLOPIDOGREL BISULFATE 75 MG: 75 TABLET, FILM COATED ORAL at 10:39

## 2020-01-01 RX ADMIN — HEPARIN SODIUM 5000 UNITS: 5000 INJECTION INTRAVENOUS; SUBCUTANEOUS at 23:22

## 2020-01-01 RX ADMIN — FUROSEMIDE 40 MG: 10 INJECTION, SOLUTION INTRAMUSCULAR; INTRAVENOUS at 16:30

## 2020-01-01 RX ADMIN — I.V. FAT EMULSION 89.4 ML: 20 EMULSION INTRAVENOUS at 14:19

## 2020-01-01 RX ADMIN — ROCURONIUM BROMIDE 50 MG: 10 INJECTION, SOLUTION INTRAVENOUS at 14:05

## 2020-01-01 RX ADMIN — SODIUM POLYSTYRENE SULFONATE 50 G: 15 SUSPENSION ORAL; RECTAL at 04:24

## 2020-01-01 RX ADMIN — FAMOTIDINE 20 MG: 20 TABLET, FILM COATED ORAL at 08:53

## 2020-01-01 RX ADMIN — Medication 1 CAPSULE: at 10:06

## 2020-01-01 RX ADMIN — PROPOFOL 50 MG: 10 INJECTION, EMULSION INTRAVENOUS at 14:05

## 2020-01-01 RX ADMIN — ATORVASTATIN CALCIUM 20 MG: 20 TABLET, FILM COATED ORAL at 08:43

## 2020-01-01 RX ADMIN — CLONIDINE HYDROCHLORIDE 0.2 MG: 0.1 TABLET ORAL at 07:29

## 2020-01-01 RX ADMIN — SODIUM CHLORIDE 250 ML/HR: 900 INJECTION, SOLUTION INTRAVENOUS at 20:44

## 2020-01-01 RX ADMIN — CLONIDINE HYDROCHLORIDE 0.2 MG: 0.1 TABLET ORAL at 10:06

## 2020-01-01 RX ADMIN — Medication 10 ML: at 05:17

## 2020-01-01 RX ADMIN — HEPARIN SODIUM 5000 UNITS: 5000 INJECTION INTRAVENOUS; SUBCUTANEOUS at 22:13

## 2020-01-01 RX ADMIN — LORAZEPAM 0.5 MG: 2 INJECTION INTRAMUSCULAR; INTRAVENOUS at 01:03

## 2020-01-01 RX ADMIN — HYDRALAZINE HYDROCHLORIDE 25 MG: 25 TABLET, FILM COATED ORAL at 15:31

## 2020-01-01 RX ADMIN — Medication 0.01 MG: at 14:17

## 2020-01-01 RX ADMIN — ALLOPURINOL 100 MG: 100 TABLET ORAL at 16:11

## 2020-01-01 RX ADMIN — HEPARIN SODIUM 12 UNITS/KG/HR: 10000 INJECTION, SOLUTION INTRAVENOUS at 15:54

## 2020-01-01 RX ADMIN — CLONIDINE HYDROCHLORIDE 0.2 MG: 0.1 TABLET ORAL at 22:13

## 2020-01-01 RX ADMIN — HEPARIN SODIUM 5000 UNITS: 5000 INJECTION INTRAVENOUS; SUBCUTANEOUS at 15:14

## 2020-01-01 RX ADMIN — CEFTRIAXONE SODIUM 1 G: 1 INJECTION, POWDER, FOR SOLUTION INTRAMUSCULAR; INTRAVENOUS at 14:09

## 2020-01-01 RX ADMIN — Medication 10 ML: at 05:39

## 2020-01-01 RX ADMIN — DEXTROSE MONOHYDRATE 25 G: 25 INJECTION, SOLUTION INTRAVENOUS at 14:46

## 2020-01-01 RX ADMIN — FAMOTIDINE 20 MG: 20 TABLET, FILM COATED ORAL at 10:06

## 2020-01-01 RX ADMIN — FELODIPINE 10 MG: 5 TABLET, EXTENDED RELEASE ORAL at 08:50

## 2020-01-01 RX ADMIN — FUROSEMIDE 20 MG: 10 INJECTION, SOLUTION INTRAMUSCULAR; INTRAVENOUS at 18:07

## 2020-01-01 RX ADMIN — SODIUM BICARBONATE: 84 INJECTION, SOLUTION INTRAVENOUS at 15:04

## 2020-01-01 RX ADMIN — TRAMADOL HYDROCHLORIDE 25 MG: 50 TABLET, FILM COATED ORAL at 15:14

## 2020-01-01 RX ADMIN — Medication 0.01 MG: at 14:11

## 2020-01-01 RX ADMIN — ALBUTEROL SULFATE 10 MG: 2.5 SOLUTION RESPIRATORY (INHALATION) at 09:42

## 2020-01-01 RX ADMIN — Medication 10 ML: at 21:55

## 2020-01-01 RX ADMIN — Medication 1 CAPSULE: at 12:37

## 2020-01-01 RX ADMIN — HALOPERIDOL 2 MG: 2 SOLUTION ORAL at 02:02

## 2020-01-01 RX ADMIN — GUAIFENESIN 600 MG: 600 TABLET, EXTENDED RELEASE ORAL at 22:12

## 2020-01-01 RX ADMIN — Medication 10 ML: at 21:35

## 2020-01-01 RX ADMIN — CEFTRIAXONE SODIUM 1 G: 1 INJECTION, POWDER, FOR SOLUTION INTRAMUSCULAR; INTRAVENOUS at 14:24

## 2020-01-01 RX ADMIN — WATER 1 G: 1 INJECTION INTRAMUSCULAR; INTRAVENOUS; SUBCUTANEOUS at 14:56

## 2020-01-01 RX ADMIN — SODIUM POLYSTYRENE SULFONATE 15 G: 15 SUSPENSION ORAL; RECTAL at 13:50

## 2020-01-01 RX ADMIN — DEXTROSE MONOHYDRATE: 5 INJECTION, SOLUTION INTRAVENOUS at 14:37

## 2020-01-01 RX ADMIN — CALCIUM GLUCONATE 1 G: 98 INJECTION, SOLUTION INTRAVENOUS at 20:07

## 2020-01-01 RX ADMIN — HEPARIN SODIUM 5000 UNITS: 5000 INJECTION INTRAVENOUS; SUBCUTANEOUS at 06:33

## 2020-01-01 RX ADMIN — HEPARIN SODIUM 5000 UNITS: 5000 INJECTION INTRAVENOUS; SUBCUTANEOUS at 05:57

## 2020-01-01 RX ADMIN — CEFTRIAXONE SODIUM 1 G: 1 INJECTION, POWDER, FOR SOLUTION INTRAMUSCULAR; INTRAVENOUS at 13:41

## 2020-01-01 RX ADMIN — INSULIN HUMAN 10 UNITS: 100 INJECTION, SOLUTION PARENTERAL at 09:41

## 2020-01-01 RX ADMIN — CARVEDILOL 3.12 MG: 3.12 TABLET, FILM COATED ORAL at 16:11

## 2020-01-01 RX ADMIN — ATORVASTATIN CALCIUM 20 MG: 20 TABLET, FILM COATED ORAL at 10:39

## 2020-01-01 RX ADMIN — MEMANTINE HYDROCHLORIDE 21 MG: 21 CAPSULE, EXTENDED RELEASE ORAL at 09:00

## 2020-01-01 RX ADMIN — Medication 10 ML: at 15:14

## 2020-01-01 RX ADMIN — HYDROCODONE BITARTRATE AND ACETAMINOPHEN 1 TABLET: 5; 325 TABLET ORAL at 21:53

## 2020-01-01 RX ADMIN — MORPHINE SULFATE 1 MG: 2 INJECTION, SOLUTION INTRAMUSCULAR; INTRAVENOUS at 11:12

## 2020-01-01 RX ADMIN — HEPARIN SODIUM 5000 UNITS: 5000 INJECTION INTRAVENOUS; SUBCUTANEOUS at 21:40

## 2020-01-01 RX ADMIN — CLONIDINE HYDROCHLORIDE 0.2 MG: 0.1 TABLET ORAL at 14:55

## 2020-01-01 RX ADMIN — CARVEDILOL 6.25 MG: 6.25 TABLET, FILM COATED ORAL at 16:55

## 2020-01-01 RX ADMIN — METOPROLOL TARTRATE 25 MG: 25 TABLET, FILM COATED ORAL at 21:53

## 2020-01-01 RX ADMIN — FAMOTIDINE 20 MG: 20 TABLET, FILM COATED ORAL at 16:39

## 2020-01-01 RX ADMIN — HEPARIN SODIUM 5000 UNITS: 5000 INJECTION INTRAVENOUS; SUBCUTANEOUS at 05:26

## 2020-01-01 RX ADMIN — VASOPRESSIN 2 UNITS: 20 INJECTION INTRAVENOUS at 14:11

## 2020-01-01 RX ADMIN — QUETIAPINE FUMARATE 25 MG: 25 TABLET ORAL at 16:29

## 2020-01-01 RX ADMIN — LORAZEPAM 0.5 MG: 2 INJECTION INTRAMUSCULAR; INTRAVENOUS at 04:39

## 2020-01-01 RX ADMIN — ATORVASTATIN CALCIUM 20 MG: 20 TABLET, FILM COATED ORAL at 08:53

## 2020-01-01 RX ADMIN — Medication 10 ML: at 21:56

## 2020-01-01 RX ADMIN — HEPARIN SODIUM 5000 UNITS: 5000 INJECTION INTRAVENOUS; SUBCUTANEOUS at 14:10

## 2020-01-01 RX ADMIN — ONDANSETRON 4 MG: 2 INJECTION INTRAMUSCULAR; INTRAVENOUS at 14:05

## 2020-01-01 RX ADMIN — CEFTRIAXONE SODIUM 1 G: 1 INJECTION, POWDER, FOR SOLUTION INTRAMUSCULAR; INTRAVENOUS at 15:31

## 2020-01-01 RX ADMIN — ROPIVACAINE HYDROCHLORIDE 40 MG: 2 INJECTION, SOLUTION EPIDURAL; INFILTRATION at 14:11

## 2020-01-01 RX ADMIN — FUROSEMIDE 20 MG: 10 INJECTION, SOLUTION INTRAMUSCULAR; INTRAVENOUS at 15:22

## 2020-01-01 RX ADMIN — CLONIDINE HYDROCHLORIDE 0.2 MG: 0.1 TABLET ORAL at 17:06

## 2020-01-01 RX ADMIN — ACETAMINOPHEN 650 MG: 650 SUPPOSITORY RECTAL at 20:30

## 2020-01-01 RX ADMIN — DEXAMETHASONE SODIUM PHOSPHATE 5 MG: 4 INJECTION, SOLUTION INTRAMUSCULAR; INTRAVENOUS at 14:11

## 2020-01-01 RX ADMIN — ZOLPIDEM TARTRATE 10 MG: 5 TABLET ORAL at 21:53

## 2020-01-01 RX ADMIN — CLONIDINE HYDROCHLORIDE 0.1 MG: 0.1 TABLET ORAL at 05:44

## 2020-01-01 RX ADMIN — CARVEDILOL 6.25 MG: 6.25 TABLET, FILM COATED ORAL at 16:14

## 2020-01-01 RX ADMIN — HEPARIN SODIUM 5000 UNITS: 5000 INJECTION INTRAVENOUS; SUBCUTANEOUS at 21:19

## 2020-01-01 RX ADMIN — CLONIDINE HYDROCHLORIDE 0.2 MG: 0.1 TABLET ORAL at 10:39

## 2020-01-01 RX ADMIN — INSULIN HUMAN 10 UNITS: 100 INJECTION, SOLUTION PARENTERAL at 10:00

## 2020-01-01 RX ADMIN — Medication 1 CAPSULE: at 08:53

## 2020-01-01 RX ADMIN — AMLODIPINE BESYLATE 5 MG: 5 TABLET ORAL at 21:17

## 2020-01-01 RX ADMIN — ALBUTEROL SULFATE 1.25 MG: 1.25 SOLUTION RESPIRATORY (INHALATION) at 15:07

## 2020-01-01 RX ADMIN — HEPARIN SODIUM 5000 UNITS: 5000 INJECTION INTRAVENOUS; SUBCUTANEOUS at 15:30

## 2020-01-01 RX ADMIN — MORPHINE SULFATE 2 MG: 2 INJECTION, SOLUTION INTRAMUSCULAR; INTRAVENOUS at 13:51

## 2020-01-01 RX ADMIN — HEPARIN SODIUM 5000 UNITS: 5000 INJECTION INTRAVENOUS; SUBCUTANEOUS at 21:35

## 2020-01-01 RX ADMIN — FENTANYL CITRATE 100 MCG: 50 INJECTION INTRAMUSCULAR; INTRAVENOUS at 14:03

## 2020-01-01 RX ADMIN — ONDANSETRON 4 MG: 2 INJECTION INTRAMUSCULAR; INTRAVENOUS at 12:02

## 2020-01-01 RX ADMIN — MORPHINE SULFATE 4 MG: 4 INJECTION, SOLUTION INTRAMUSCULAR; INTRAVENOUS at 12:02

## 2020-01-01 RX ADMIN — VASOPRESSIN 2 UNITS: 20 INJECTION INTRAVENOUS at 14:08

## 2020-01-01 RX ADMIN — FELODIPINE 10 MG: 5 TABLET, EXTENDED RELEASE ORAL at 08:47

## 2020-01-01 RX ADMIN — CEFAZOLIN SODIUM 2 G: 2 SOLUTION INTRAVENOUS at 06:34

## 2020-01-01 RX ADMIN — MORPHINE SULFATE 1 MG: 2 INJECTION, SOLUTION INTRAMUSCULAR; INTRAVENOUS at 06:28

## 2020-01-01 RX ADMIN — CLONIDINE HYDROCHLORIDE 0.1 MG: 0.1 TABLET ORAL at 03:00

## 2020-01-01 RX ADMIN — CLONIDINE HYDROCHLORIDE 0.2 MG: 0.1 TABLET ORAL at 18:33

## 2020-01-01 RX ADMIN — FAMOTIDINE 20 MG: 20 TABLET, FILM COATED ORAL at 10:39

## 2020-01-01 RX ADMIN — SODIUM BICARBONATE: 84 INJECTION, SOLUTION INTRAVENOUS at 00:47

## 2020-01-01 RX ADMIN — SODIUM CHLORIDE 50 ML/HR: 900 INJECTION, SOLUTION INTRAVENOUS at 18:05

## 2020-01-01 RX ADMIN — GLYCOPYRROLATE 0.2 MG: 0.2 INJECTION INTRAMUSCULAR; INTRAVENOUS at 14:08

## 2020-01-01 RX ADMIN — SODIUM CHLORIDE 50 ML/HR: 900 INJECTION, SOLUTION INTRAVENOUS at 19:14

## 2020-01-01 RX ADMIN — CLONIDINE HYDROCHLORIDE 0.2 MG: 0.1 TABLET ORAL at 08:43

## 2020-01-01 RX ADMIN — HALOPERIDOL 2 MG: 2 SOLUTION ORAL at 21:30

## 2020-01-01 RX ADMIN — HYDRALAZINE HYDROCHLORIDE 25 MG: 25 TABLET, FILM COATED ORAL at 10:39

## 2020-01-01 RX ADMIN — Medication 1 CAPSULE: at 09:58

## 2020-01-01 RX ADMIN — CARVEDILOL 3.12 MG: 3.12 TABLET, FILM COATED ORAL at 08:50

## 2020-01-01 RX ADMIN — CLONIDINE HYDROCHLORIDE 0.2 MG: 0.1 TABLET ORAL at 16:52

## 2020-01-01 RX ADMIN — HEPARIN SODIUM 5000 UNITS: 5000 INJECTION INTRAVENOUS; SUBCUTANEOUS at 05:39

## 2020-01-01 RX ADMIN — MEMANTINE HYDROCHLORIDE 21 MG: 21 CAPSULE, EXTENDED RELEASE ORAL at 10:07

## 2020-01-01 RX ADMIN — GUAIFENESIN 600 MG: 600 TABLET, EXTENDED RELEASE ORAL at 21:17

## 2020-01-01 RX ADMIN — Medication 10 ML: at 09:57

## 2020-01-01 RX ADMIN — HEPARIN SODIUM 5000 UNITS: 5000 INJECTION INTRAVENOUS; SUBCUTANEOUS at 13:10

## 2020-01-01 RX ADMIN — TAMSULOSIN HYDROCHLORIDE 0.4 MG: 0.4 CAPSULE ORAL at 12:53

## 2020-01-01 RX ADMIN — AMLODIPINE BESYLATE 5 MG: 5 TABLET ORAL at 17:19

## 2020-01-01 RX ADMIN — FELODIPINE 10 MG: 5 TABLET, EXTENDED RELEASE ORAL at 09:14

## 2020-01-01 RX ADMIN — CLONIDINE HYDROCHLORIDE 0.2 MG: 0.1 TABLET ORAL at 18:21

## 2020-01-01 RX ADMIN — TRAMADOL HYDROCHLORIDE 25 MG: 50 TABLET, FILM COATED ORAL at 09:14

## 2020-01-01 RX ADMIN — FAMOTIDINE 20 MG: 10 INJECTION, SOLUTION INTRAVENOUS at 12:12

## 2020-01-01 RX ADMIN — HEPARIN SODIUM 668 UNITS/HR: 10000 INJECTION, SOLUTION INTRAVENOUS at 21:43

## 2020-01-01 RX ADMIN — MORPHINE SULFATE 1 MG: 2 INJECTION, SOLUTION INTRAMUSCULAR; INTRAVENOUS at 18:32

## 2020-01-01 RX ADMIN — HEPARIN SODIUM 3840 UNITS: 1000 INJECTION, SOLUTION INTRAVENOUS; SUBCUTANEOUS at 15:56

## 2020-01-01 RX ADMIN — CEFAZOLIN 2 G: 10 INJECTION, POWDER, FOR SOLUTION INTRAVENOUS at 14:00

## 2020-01-01 RX ADMIN — MORPHINE SULFATE 1 MG: 2 INJECTION, SOLUTION INTRAMUSCULAR; INTRAVENOUS at 11:07

## 2020-01-01 RX ADMIN — HEPARIN SODIUM 5000 UNITS: 5000 INJECTION INTRAVENOUS; SUBCUTANEOUS at 15:05

## 2020-01-01 RX ADMIN — SODIUM BICARBONATE: 84 INJECTION, SOLUTION INTRAVENOUS at 22:18

## 2020-01-01 RX ADMIN — MORPHINE SULFATE 1 MG: 2 INJECTION, SOLUTION INTRAMUSCULAR; INTRAVENOUS at 17:23

## 2020-01-01 RX ADMIN — CARVEDILOL 6.25 MG: 6.25 TABLET, FILM COATED ORAL at 09:13

## 2020-01-01 RX ADMIN — HALOPERIDOL LACTATE 3 MG: 5 INJECTION INTRAMUSCULAR at 13:41

## 2020-01-01 RX ADMIN — GUAIFENESIN 600 MG: 600 TABLET, EXTENDED RELEASE ORAL at 10:39

## 2020-01-01 RX ADMIN — QUETIAPINE FUMARATE 25 MG: 25 TABLET ORAL at 10:39

## 2020-01-01 RX ADMIN — GUAIFENESIN 600 MG: 600 TABLET, EXTENDED RELEASE ORAL at 08:53

## 2020-01-01 RX ADMIN — CLONIDINE HYDROCHLORIDE 0.2 MG: 0.1 TABLET ORAL at 18:07

## 2020-01-01 RX ADMIN — HEPARIN SODIUM 5000 UNITS: 5000 INJECTION INTRAVENOUS; SUBCUTANEOUS at 14:24

## 2020-01-01 RX ADMIN — HALOPERIDOL 2 MG: 2 SOLUTION ORAL at 16:14

## 2020-01-01 RX ADMIN — ATORVASTATIN CALCIUM 20 MG: 20 TABLET, FILM COATED ORAL at 08:50

## 2020-01-01 RX ADMIN — CLOPIDOGREL BISULFATE 75 MG: 75 TABLET, FILM COATED ORAL at 08:53

## 2020-01-01 RX ADMIN — QUETIAPINE FUMARATE 25 MG: 25 TABLET ORAL at 18:07

## 2020-01-01 RX ADMIN — Medication 10 ML: at 21:40

## 2020-01-01 RX ADMIN — GUAIFENESIN 600 MG: 600 TABLET, EXTENDED RELEASE ORAL at 10:06

## 2020-01-01 RX ADMIN — ATORVASTATIN CALCIUM 20 MG: 20 TABLET, FILM COATED ORAL at 10:06

## 2020-01-01 RX ADMIN — FUROSEMIDE 20 MG: 10 INJECTION, SOLUTION INTRAMUSCULAR; INTRAVENOUS at 10:40

## 2020-01-01 RX ADMIN — SUGAMMADEX 100 MG: 100 INJECTION, SOLUTION INTRAVENOUS at 15:34

## 2020-01-01 RX ADMIN — HEPARIN SODIUM 5000 UNITS: 5000 INJECTION INTRAVENOUS; SUBCUTANEOUS at 05:16

## 2020-01-01 RX ADMIN — CEFAZOLIN SODIUM 2 G: 2 SOLUTION INTRAVENOUS at 15:05

## 2020-01-01 RX ADMIN — SODIUM POLYSTYRENE SULFONATE 30 G: 15 SUSPENSION ORAL; RECTAL at 14:46

## 2020-01-01 RX ADMIN — HALOPERIDOL LACTATE 4 MG: 5 INJECTION INTRAMUSCULAR at 14:10

## 2020-01-01 RX ADMIN — CEFAZOLIN SODIUM 2 G: 2 SOLUTION INTRAVENOUS at 21:19

## 2020-01-01 RX ADMIN — DEXTROSE MONOHYDRATE AND SODIUM CHLORIDE 30 ML/HR: 5; .45 INJECTION, SOLUTION INTRAVENOUS at 19:26

## 2020-01-01 RX ADMIN — CLOPIDOGREL BISULFATE 75 MG: 75 TABLET, FILM COATED ORAL at 10:06

## 2020-01-01 RX ADMIN — ATORVASTATIN CALCIUM 20 MG: 20 TABLET, FILM COATED ORAL at 09:14

## 2020-01-01 RX ADMIN — TRAMADOL HYDROCHLORIDE 25 MG: 50 TABLET, FILM COATED ORAL at 15:39

## 2020-01-01 RX ADMIN — ATORVASTATIN CALCIUM 20 MG: 20 TABLET, FILM COATED ORAL at 08:37

## 2020-01-01 RX ADMIN — SODIUM BICARBONATE 50 MEQ: 84 INJECTION, SOLUTION INTRAVENOUS at 14:37

## 2020-01-01 RX ADMIN — DEXTROSE MONOHYDRATE 25 G: 25 INJECTION, SOLUTION INTRAVENOUS at 09:51

## 2020-01-01 RX ADMIN — CALCIUM GLUCONATE 1 G: 98 INJECTION, SOLUTION INTRAVENOUS at 11:27

## 2020-01-01 RX ADMIN — Medication 10 ML: at 21:19

## 2020-01-01 RX ADMIN — HEPARIN SODIUM 5000 UNITS: 5000 INJECTION INTRAVENOUS; SUBCUTANEOUS at 13:40

## 2020-01-01 RX ADMIN — MORPHINE SULFATE 1 MG: 2 INJECTION, SOLUTION INTRAMUSCULAR; INTRAVENOUS at 15:04

## 2020-01-01 RX ADMIN — SODIUM CHLORIDE 50 ML/HR: 900 INJECTION, SOLUTION INTRAVENOUS at 13:50

## 2020-01-01 RX ADMIN — SODIUM POLYSTYRENE SULFONATE 30 G: 15 SUSPENSION ORAL; RECTAL at 20:07

## 2020-01-01 RX ADMIN — HEPARIN SODIUM 5000 UNITS: 5000 INJECTION INTRAVENOUS; SUBCUTANEOUS at 05:53

## 2020-01-01 RX ADMIN — MEMANTINE HYDROCHLORIDE 21 MG: 21 CAPSULE, EXTENDED RELEASE ORAL at 12:37

## 2020-01-01 RX ADMIN — QUETIAPINE FUMARATE 25 MG: 25 TABLET ORAL at 21:53

## 2020-01-01 RX ADMIN — SODIUM BICARBONATE 50 MEQ: 84 INJECTION, SOLUTION INTRAVENOUS at 11:27

## 2020-01-01 RX ADMIN — IPRATROPIUM BROMIDE AND ALBUTEROL SULFATE 3 ML: .5; 3 SOLUTION RESPIRATORY (INHALATION) at 13:11

## 2020-01-05 PROBLEM — R41.82 ALTERED MENTAL STATUS: Status: ACTIVE | Noted: 2020-01-01

## 2020-01-05 PROBLEM — R06.00 DYSPNEA: Status: ACTIVE | Noted: 2020-01-01

## 2020-01-05 PROBLEM — N39.0 UTI (URINARY TRACT INFECTION): Status: ACTIVE | Noted: 2020-01-01

## 2020-01-05 PROBLEM — I50.9 CHF, ACUTE ON CHRONIC (HCC): Status: ACTIVE | Noted: 2020-01-01

## 2020-01-05 NOTE — ED PROVIDER NOTES
EMERGENCY DEPARTMENT HISTORY AND PHYSICAL EXAM    12:57 PM      Date: 1/5/2020  Patient Name: Addison Ferris    History of Presenting Illness     No chief complaint on file. History Provided By: Patient, Patient's Daughter and And care provider    Additional History (Context): Addison Ferris is a 80 y.o. female with Past medical history of dementia, anxiety, stroke, GERD, chronic kidney disease, right bundle branch block, hypertension who presents with chief complaint of anxiety that started last night. Associated symptom is shortness of breath and agitation. Family member/caregiver at the bedside states that patient did not get any sleep most of the night and became very agitated. Family member/caregiver states that at one point when she was very anxious she had complained of some chest discomfort but that resolved. Another care provider at bedside states that patient's urine is very foul-smelling. They are concerned that she may have a urinary tract. Family member states that patient does take medication for anxiety. Patient denies any current chest pain, abdominal pain, nausea, and no other complaint.     PCP: Annika Lopez MD        Past History     Past Medical History:  Past Medical History:   Diagnosis Date    Anemia NEC     Anxiety     Cataract     bilat    Colon cancer ([facility] [address] 1999    CRI (chronic renal insufficiency) 4/07    CVA (cerebral infarction) 4/01    right facial droop    CVA (cerebral infarction) 2/11    left post corona radiata    Dementia ([facility] [address]     Diverticulosis     Edema     GERD (gastroesophageal reflux disease)     Hypercholesterolemia     Hypertension     Hypomagnesemia 2/11    Lipoma of neck     right    Orthostatic hypotension     RBBB (right bundle branch block)     Syncope 6/08    TIA (transient ischemic attack) mid 1990's       Past Surgical History:  Past Surgical History:   Procedure Laterality Date    ENDOSCOPY, COLON, DIAGNOSTIC  2006    HX BREAST BIOPSY      HX CATARACT REMOVAL  10/02    left    HX CATARACT REMOVAL  6/03    right    HX COLECTOMY  1999    right hemicolectomy    HX HYSTERECTOMY      HX LIPOMA RESECTION         Family History:  No family history on file. Social History:  Social History     Tobacco Use    Smoking status: Never Smoker    Smokeless tobacco: Never Used   Substance Use Topics    Alcohol use: No    Drug use: Not on file       Allergies: Allergies   Allergen Reactions    Shellfish Derived Hives and Nausea and Vomiting         Review of Systems       Review of Systems   Constitutional: Negative for chills and fever. HENT: Negative for congestion, rhinorrhea, sore throat and trouble swallowing. Eyes: Negative for visual disturbance. Respiratory: Positive for shortness of breath. Negative for cough and wheezing. Cardiovascular: Positive for chest pain (Resolved, but was associated with her anxiety). Gastrointestinal: Negative for abdominal pain, nausea and vomiting. Endocrine: Negative for polyuria. Genitourinary: Negative for dysuria. Foul-smelling urine per care provider   Musculoskeletal: Negative for arthralgias and neck stiffness. Skin: Negative for pallor and rash. Neurological: Negative for dizziness, weakness, numbness and headaches. Hematological: Does not bruise/bleed easily. Psychiatric/Behavioral: Positive for agitation. Negative for confusion and dysphoric mood. The patient is nervous/anxious. All other systems reviewed and are negative. Physical Exam     Visit Vitals  /74 (BP 1 Location: Right arm, BP Patient Position: At rest;Sitting)   Pulse 80   Temp 98.3 °F (36.8 °C)   Resp 16   Ht 4' 11\" (1.499 m)   Wt 59.9 kg (132 lb)   SpO2 96%   BMI 26.66 kg/m²         Physical Exam  Vitals signs and nursing note reviewed. Constitutional:       General: She is not in acute distress. Appearance: She is well-developed. She is not diaphoretic.    HENT: Head: Normocephalic and atraumatic. Eyes:      General: No scleral icterus. Conjunctiva/sclera: Conjunctivae normal.      Pupils: Pupils are equal, round, and reactive to light. Neck:      Musculoskeletal: Normal range of motion and neck supple. Cardiovascular:      Rate and Rhythm: Normal rate. Comments: Capillary refill < 3 seconds  Pulmonary:      Effort: Pulmonary effort is normal. No respiratory distress. Breath sounds: Rales present. No wheezing. Abdominal:      General: Bowel sounds are normal. There is no distension. Palpations: Abdomen is soft. Tenderness: There is no tenderness. Musculoskeletal: Normal range of motion. General: No tenderness. Right lower leg: No edema. Left lower leg: No edema. Lymphadenopathy:      Cervical: No cervical adenopathy. Skin:     General: Skin is warm and dry. Neurological:      Mental Status: She is alert and oriented to person, place, and time. Cranial Nerves: No cranial nerve deficit. Sensory: No sensory deficit. Motor: No weakness. Coordination: Coordination normal.   Psychiatric:      Comments: Pleasantly confused with history of dementia           Diagnostic Study Results     Labs -  Recent Results (from the past 12 hour(s))   CBC WITH AUTOMATED DIFF    Collection Time: 01/05/20 12:52 PM   Result Value Ref Range    WBC 10.7 4.6 - 13.2 K/uL    RBC 4.36 4.20 - 5.30 M/uL    HGB 12.0 12.0 - 16.0 g/dL    HCT 38.0 35.0 - 45.0 %    MCV 87.2 74.0 - 97.0 FL    MCH 27.5 24.0 - 34.0 PG    MCHC 31.6 31.0 - 37.0 g/dL    RDW 15.1 (H) 11.6 - 14.5 %    PLATELET 287 767 - 786 K/uL    MPV 10.5 9.2 - 11.8 FL    NEUTROPHILS 81 (H) 40 - 73 %    LYMPHOCYTES 14 (L) 21 - 52 %    MONOCYTES 4 3 - 10 %    EOSINOPHILS 1 0 - 5 %    BASOPHILS 0 0 - 2 %    ABS. NEUTROPHILS 8.7 (H) 1.8 - 8.0 K/UL    ABS. LYMPHOCYTES 1.5 0.9 - 3.6 K/UL    ABS. MONOCYTES 0.4 0.05 - 1.2 K/UL    ABS. EOSINOPHILS 0.1 0.0 - 0.4 K/UL    ABS. BASOPHILS 0.0 0.0 - 0.1 K/UL    DF AUTOMATED     METABOLIC PANEL, BASIC    Collection Time: 01/05/20 12:52 PM   Result Value Ref Range    Sodium 140 136 - 145 mmol/L    Potassium 4.7 3.5 - 5.5 mmol/L    Chloride 106 100 - 111 mmol/L    CO2 29 21 - 32 mmol/L    Anion gap 5 3.0 - 18 mmol/L    Glucose 191 (H) 74 - 99 mg/dL    BUN 25 (H) 7.0 - 18 MG/DL    Creatinine 1.85 (H) 0.6 - 1.3 MG/DL    BUN/Creatinine ratio 14 12 - 20      GFR est AA 31 (L) >60 ml/min/1.73m2    GFR est non-AA 25 (L) >60 ml/min/1.73m2    Calcium 9.5 8.5 - 10.1 MG/DL   CARDIAC PANEL,(CK, CKMB & TROPONIN)    Collection Time: 01/05/20 12:52 PM   Result Value Ref Range    CK 73 26 - 192 U/L    CK - MB 2.1 <3.6 ng/ml    CK-MB Index 2.9 0.0 - 4.0 %    Troponin-I, QT 0.34 (H) 0.0 - 0.045 NG/ML   NT-PRO BNP    Collection Time: 01/05/20 12:52 PM   Result Value Ref Range    NT pro-BNP 8,918 (H) 0 - 1,800 PG/ML   URINALYSIS W/ RFLX MICROSCOPIC    Collection Time: 01/05/20  1:25 PM   Result Value Ref Range    Color YELLOW      Appearance CLEAR      Specific gravity 1.011 1.005 - 1.030      pH (UA) 5.5 5.0 - 8.0      Protein 100 (A) NEG mg/dL    Glucose NEGATIVE  NEG mg/dL    Ketone NEGATIVE  NEG mg/dL    Bilirubin NEGATIVE  NEG      Blood SMALL (A) NEG      Urobilinogen 0.2 0.2 - 1.0 EU/dL    Nitrites NEGATIVE  NEG      Leukocyte Esterase MODERATE (A) NEG     URINE MICROSCOPIC ONLY    Collection Time: 01/05/20  1:25 PM   Result Value Ref Range    WBC 45 to 50 0 - 4 /hpf    RBC 3 to 5 0 - 5 /hpf    Epithelial cells FEW 0 - 5 /lpf    Bacteria 1+ (A) NEG /hpf       Radiologic Studies -   XR CHEST PORT    (Results Pending)   Per my preliminary: Cardiomegaly, increased pulmonary vasculature, CHF  Darrall File DO      Medical Decision Making   I am the first provider for this patient. I reviewed the vital signs, available nursing notes, past medical history, past surgical history, family history and social history.     Vital Signs-Reviewed the patient's vital signs.    Pulse Oximetry Analysis -  95 on room air (Interpretation) normal    Cardiac Monitor:  Rate: 85  Rhythm:  Normal Sinus Rhythm     EKG: Interpreted by the EP Dr. Alberto Matthews. Time Interpreted: 1:05 PM   Rate: 85   Rhythm: Normal Sinus Rhythm    Interpretation: Prolonged QRS duration, incomplete right bundle branch block, nonspecific T wave change   Comparison: No significant change from previous EKG on June 5, 2019    Records Reviewed: Nursing Notes and Old Medical Records (Time of Review: 12:57 PM)    Provider Notes (Medical Decision Making): DDX: Anxiety attack, encephalopathy, dementia, metabolic, UTI, CHF, pulmonary    Check labs, EKG, chest x-ray, urine    MDM    Medications   furosemide (LASIX) injection 40 mg (has no administration in time range)   cefTRIAXone (ROCEPHIN) 1 g in sterile water (preservative free) 10 mL IV syringe (1 g IntraVENous Given 1/5/20 4406)   cloNIDine HCl (CATAPRES) tablet 0.2 mg (0.2 mg Oral Given 1/5/20 3435)         ED Course: Progress Notes, Reevaluation, and Consults:  WBC within normal limits  Creatinine elevated 1.85, stable and patient with history of chronic kidney disease    Troponin slightly elevated 0.3 but patient with history of elevated troponin. We will trend this. Again, no EKG changes, and patient has no current chest pain    UA: Positive leukocytes, positive bacteria, positive WBCs consistent with UTI. Culture urine, started IV Rocephin    BNP 9000  Has some mild increased pulmonary vasculature on chest x-ray consistent with mild CHF    In light of acute mental status changes with infectious process of UTI and also CHF with shortness of breath, will admit patient. Will consult hospitalist.    Consult:  Discussed care with Dr. Paulina White, Specialty: Hospitalist, standard discussion; including history of patients chief complaint, available diagnostic results, and treatment course.   He accepts admission  3:41 PM, 1/5/2020     I discussed diagnosis, results, plan for admission with family and patient who agree. Diagnosis     Clinical Impression:   1. Altered mental status, unspecified altered mental status type    2. Acute congestive heart failure, unspecified heart failure type ([address]    3. Dyspnea, unspecified type    4. Urinary tract infection without hematuria, site unspecified    5. History of dementia        Disposition: Admitted    Follow-up Information    None          Patient's Medications   Start Taking    No medications on file   Continue Taking    ALLOPURINOL (ZYLOPRIM) 100 MG TABLET    Take 100 mg by mouth every Monday and Friday. ASPIRIN DELAYED-RELEASE 81 MG TABLET    Take 81 mg by mouth daily. ATORVASTATIN (LIPITOR) 20 MG TABLET    Take 20 mg by mouth daily. CLONIDINE HCL (CATAPRES) 0.2 MG TABLET    Take 1 Tab by mouth two (2) times a day. CLOPIDOGREL (PLAVIX) 75 MG TAB    Take 75 mg by mouth daily. DIAZEPAM (VALIUM) 5 MG TABLET    Take 2.5 mg by mouth nightly. DONEPEZIL (ARICEPT) 10 MG TABLET    Take 10 mg by mouth nightly. ERGOCALCIFEROL (ERGOCALCIFEROL) 50,000 UNIT CAPSULE    TAKE 1 CAPSULE WEEKLY    FELODIPINE (PLENDIL SR) 10 MG 24 HR TABLET    Take 1 Tab by mouth daily. FERROUS SULFATE (SLOW FE) 142 MG (45 MG IRON) ER TABLET    Take  by mouth Daily (before breakfast). FOLIC ACID/MULTIVIT-MIN/LUTEIN (CENTRUM SILVER PO)    Take 1 Tab by mouth daily. FUROSEMIDE (LASIX) 40 MG TABLET    Take 1 Tab by mouth daily. LORATADINE (CLARITIN) 10 MG TABLET    Take 10 mg by mouth daily as needed for Allergies. MEMANTINE ER (NAMENDA XR) 21 MG CAPSULE    Take 21 mg by mouth daily. VITAMIN E (E GEMS) 1,000 UNIT CAPSULE    Take 1,000 Units by mouth daily. These Medications have changed    No medications on file   Stop Taking    No medications on file         Kym Bernal DO    Dragon medical dictation software was used for portions of this report. Unintended transcription errors may occur.      My signature above authenticates this document and my orders, the final    diagnosis (es), discharge prescription (s), and instructions in the Epic    record.

## 2020-01-05 NOTE — H&P
History & Physical    Patient: Tyson Roman MRN: [id number]  CSN: [id number]    YOB: 1922  Age: 80 y.o. Sex: female      DOA: 1/5/2020  CC: worsened agitation per family    PCP: Shirley Simmons MD       HPI:     Tyson Roman is a 80 y.o. female with medical co-morbidities including HTN, chronic diastolic CHF, CAD, CKD III, h/o CVA, Advanced age with Alzheimer's dementia, presented from home due to worsened agitation per caretaker. According to her caretaker, she has been more agitated over the last few days, no associated fever. She has dyspnea with exertion but no leg swelling. She baseline agitation that she characterized as chest pain. Recently, pt was seen by PCP for the dyspnea but chest xray was unrevealing. She also had increased lasix to 60mg daily but her caretaker that it was too much due to her frequent urination. At home she has been using prn dose of valium. As for her Alzheimer's, she only on Namenda, her Aricept has been stopped due to cost.     In the ER, initial concern for chest pain but that resolved. She has elevated troponin but this is chronic. It is lower than her baseline. Her proBNP elevated but less than last check at 12/2/2019. Chest xray without infiltrate. Her urinalysis with positive for infection, hence she started on Ceftriaxone. Her BP was elevated, she was started on lasix and clonidine. NOTE: her carvedilol has been stopped due to significant bradycardia. Review of Systems: limited due to her Alzheimer's dementia  GENERAL: No fever, No chill  HEENT: No change in vision, no sore throat or sinus congestion. NECK: No pain. PULMONARY: + shortness of breath, no cough or wheeze.    Cardiovascular: no pnd / orthopnea, resolved Chest Pain  GASTROINTESTINAL: No abd pain, No nausea/vomiting, No diarrhea  GENITOURINARY: + urinary frequency  MUSCULOSKELETAL: No joint or muscle pain, no back pain  DERMATOLOGIC: No rash, no itching  ENDOCRINE: NO heat or cold intolerance. No recent change in weight. HEMATOLOGICAL: No easy bruising or bleeding. NEUROLOGIC: No headache, No seizures, No generalized weakness         Past Medical History:   Diagnosis Date    Anemia NEC     Anxiety     Cataract     bilat    Colon cancer ([facility] [address] 1999    CRI (chronic renal insufficiency) 4/07    CVA (cerebral infarction) 4/01    right facial droop    CVA (cerebral infarction) 2/11    left post corona radiata    Dementia ([facility] [address]     Diverticulosis     Edema     GERD (gastroesophageal reflux disease)     Hypercholesterolemia     Hypertension     Hypomagnesemia 2/11    Lipoma of neck     right    Orthostatic hypotension     RBBB (right bundle branch block)     Syncope 6/08    TIA (transient ischemic attack) mid 1990's       Past Surgical History:   Procedure Laterality Date    ENDOSCOPY, COLON, DIAGNOSTIC  2006    HX BREAST BIOPSY      HX CATARACT REMOVAL  10/02    left    HX CATARACT REMOVAL  6/03    right    HX COLECTOMY  1999    right hemicolectomy    HX HYSTERECTOMY      HX LIPOMA RESECTION         No family history on file. Social History     Socioeconomic History    Marital status:      Spouse name: Not on file    Number of children: Not on file    Years of education: Not on file    Highest education level: Not on file   Tobacco Use    Smoking status: Never Smoker    Smokeless tobacco: Never Used   Substance and Sexual Activity    Alcohol use: No    Sexual activity: Never       Prior to Admission medications    Medication Sig Start Date End Date Taking? Authorizing Provider   ergocalciferol (ERGOCALCIFEROL) 50,000 unit capsule TAKE 1 CAPSULE WEEKLY 11/18/19  Yes Provider, Historical   vitamin e (E GEMS) 1,000 unit capsule Take 1,000 Units by mouth daily. Yes Provider, Historical   ferrous sulfate (SLOW FE) 142 mg (45 mg iron) ER tablet Take  by mouth Daily (before breakfast).    Yes Provider, Historical   diazePAM (VALIUM) 5 mg tablet Take 2.5 mg by mouth nightly. Yes Provider, Historical   cloNIDine HCl (CATAPRES) 0.2 mg tablet Take 1 Tab by mouth two (2) times a day. Patient taking differently: Take 0.2 mg by mouth two (2) times a day. Indications: 1/2 a tablet in addition to full tablet qod 6/11/19  Yes Mirtha Sanchez MD   memantine ER (NAMENDA XR) 21 mg capsule Take 21 mg by mouth daily. Yes Provider, Historical   folic acid/multivit-min/lutein (CENTRUM SILVER PO) Take 1 Tab by mouth daily. Yes Provider, Historical   allopurinol (ZYLOPRIM) 100 mg tablet Take 100 mg by mouth every Monday and Friday. Yes Provider, Historical   atorvastatin (LIPITOR) 20 mg tablet Take 20 mg by mouth daily. Yes Provider, Historical   loratadine (CLARITIN) 10 mg tablet Take 10 mg by mouth daily as needed for Allergies. Yes Provider, Historical   clopidogrel (PLAVIX) 75 mg tab Take 75 mg by mouth daily. Yes Provider, Historical   furosemide (LASIX) 40 mg tablet Take 1 Tab by mouth daily. Patient taking differently: Take 40 mg by mouth daily. Indications: extra 20 mg every wednesday 9/12/12  Yes Namrata Matthew MD   felodipine (PLENDIL SR) 10 mg 24 hr tablet Take 1 Tab by mouth daily. 8/3/12  Yes Keyona Davis MD   aspirin delayed-release 81 mg tablet Take 81 mg by mouth daily.    Yes Provider, Historical       Allergies   Allergen Reactions    Shellfish Derived Hives and Nausea and Vomiting              Physical Exam:      Visit Vitals  /74 (BP 1 Location: Right arm, BP Patient Position: At rest;Sitting)   Pulse 80   Temp 98.3 °F (36.8 °C)   Resp 16   Ht 4' 11\" (1.499 m)   Wt 59.9 kg (132 lb)   SpO2 96%   BMI 26.66 kg/m²       Physical Exam:  Tele: sinus  General:  Cooperative, Not in acute distress, speaks in short sentence while in bed,  Caretaker at bedside  HEENT: PERRL, EOMI, supple neck, no JVD, dry oral mucosa  Cardiovascular: S1S2 regular, no rub/gallop, reproducible chest pain with palpation   Pulmonary: air entry bilaterally, no wheezing, ++ crackle  GI:  Soft, non tender, non distended, +bs, no guarding   Extremities:  No pedal edema, +distal pulses appreciated   Neuro: AOx1    Lab/Data Review:  Labs: Results:       Chemistry Recent Labs     01/05/20  1252   *      K 4.7      CO2 29   BUN 25*   CREA 1.85*   CA 9.5   AGAP 5   BUCR 14      CBC w/Diff Recent Labs     01/05/20  1252   WBC 10.7   RBC 4.36   HGB 12.0   HCT 38.0      GRANS 81*   LYMPH 14*   EOS 1      Coagulation No results for input(s): PTP, INR, APTT, INREXT, INREXT in the last 72 hours. Iron/Ferritin No results for input(s): IRON in the last 72 hours. No lab exists for component: TIBCCALC   BNP No results for input(s): BNPP in the last 72 hours. Cardiac Enzymes Recent Labs     01/05/20  1252   CPK 73   CKND1 2.9      Liver Enzymes No results for input(s): TP, ALB, TBIL, AP, SGOT, GPT in the last 72 hours. No lab exists for component: DBIL   Thyroid Studies Lab Results   Component Value Date/Time    TSH 4.98 12/22/2010 02:00 AM          All Micro Results     Procedure Component Value Units Date/Time    CULTURE, URINE [[id number]] Collected:  01/05/20 1325    Order Status:  Completed Specimen:  Cath Urine Updated:  01/05/20 1537          Imaging Reviewed:  Chest Xray:      Assessment:   Active Problems:    UTI (urinary tract infection)     Altered mental status, worsened from baseline Alzheimer's dementia     Dyspnea with possible mild acute on chronic diastolic CHF    Atypical chest pain    Chronic elevate troponin with CAD    Hypertensive urgency     CKD 3, stable at baseline creatinine           Plan:     She will be admitted for Altered mental status associated with UTI that is not resolved by observational time. continue IV ceftriaxone, urine culture follow up. Fall precaution   Can have seroquel 25mg BID for agitation with associated anxiety.  Consider wean her off Valium (BEER list med)   She received IV laxis, will continue at lower dose, 20mg BID, will recheck BMP and electrolytes. Check her cardiac enzyme, HOLD off Echo. Will follow up with cardiology to see if further workup needed. She is no longer on Carvedilol due to bradycardia event. Check TSH. Her renal function is at baseline, monitor closely. Avoid nephrotoxic medications  Can resume her home vitamins/iron if tolerated.        Risk of deterioration:  []Low    [x]Moderate  []High     Prophylaxis:  []Lovenox  []Coumadin  [x]Hep SQ  []SCDs  []H2B/PPI     Disposition:  [x]Home w/ Family   [] PT,OT,RN   []SNF/LTC   []SAH/Rehab     Discussed Code Status:         []Full Code      [x]DNR         ___________________________________________________     Care Plan discussed with:    [x]Patient   [x]Family    []ED Care Manager  [x]ED Doc   []Specialist :  Total Time Coordinating Admission:  50    minutes    []Total Critical Care Time:       Vanna Patterson MD  1/5/2020, 3:49 PM

## 2020-01-06 NOTE — PROGRESS NOTES
Problem: Patient Education: Go to Patient Education Activity  Goal: Patient/Family Education  Outcome: Progressing Towards Goal     Problem: Heart Failure: Day 1  Goal: Off Pathway (Use only if patient is Off Pathway)  Outcome: Progressing Towards Goal  Goal: Activity/Safety  Outcome: Progressing Towards Goal  Goal: Consults, if ordered  Outcome: Progressing Towards Goal  Goal: Diagnostic Test/Procedures  Outcome: Progressing Towards Goal  Goal: Nutrition/Diet  Outcome: Progressing Towards Goal  Goal: Discharge Planning  Outcome: Progressing Towards Goal  Goal: Medications  Outcome: Progressing Towards Goal  Goal: Respiratory  Outcome: Progressing Towards Goal  Goal: Treatments/Interventions/Procedures  Outcome: Progressing Towards Goal  Goal: Psychosocial  Outcome: Progressing Towards Goal  Goal: *Oxygen saturation within defined limits  Outcome: Progressing Towards Goal  Goal: *Hemodynamically stable  Outcome: Progressing Towards Goal  Goal: *Optimal pain control at patient's stated goal  Outcome: Progressing Towards Goal  Goal: *Anxiety reduced or absent  Outcome: Progressing Towards Goal     Problem: Heart Failure: Day 2  Goal: Respiratory  Outcome: Progressing Towards Goal  Goal: Treatments/Interventions/Procedures  Outcome: Progressing Towards Goal  Goal: Psychosocial  Outcome: Progressing Towards Goal     Problem: Heart Failure: Day 4  Goal: *Hemodynamically stable  Outcome: Progressing Towards Goal     Problem: Heart Failure: Day 5  Goal: Psychosocial  Outcome: Progressing Towards Goal     Problem: Altered Thought Process (Adult/Pediatric)  Goal: *STG: Participates in treatment plan  Outcome: Progressing Towards Goal  Goal: *STG: Remains safe in hospital  Outcome: Progressing Towards Goal  Goal: *STG: Seeks staff when feelings of anxiety and fear arise  Outcome: Progressing Towards Goal  Goal: *STG: Complies with medication therapy  Outcome: Progressing Towards Goal  Goal: *STG: Attends activities and groups  Outcome: Progressing Towards Goal  Goal: *STG: Decreased delusional thinking  Outcome: Progressing Towards Goal  Goal: *STG: Decreased hallucinations  Outcome: Progressing Towards Goal  Goal: *STG: Absence of lethality  Outcome: Progressing Towards Goal  Goal: *STG: Demonstrates ability to understand and use improved judgment in daily activities and relationships  Outcome: Progressing Towards Goal  Goal: *LTG: Returns to baseline functioning  Outcome: Progressing Towards Goal  Goal: Interventions  Outcome: Progressing Towards Goal     Problem: Patient Education: Go to Patient Education Activity  Goal: Patient/Family Education  Outcome: Progressing Towards Goal     Problem: Urinary Tract Infection - Adult  Goal: *Absence of infection signs and symptoms  Outcome: Progressing Towards Goal     Problem: Patient Education: Go to Patient Education Activity  Goal: Patient/Family Education  Outcome: Progressing Towards Goal     Problem: Falls - Risk of  Goal: *Absence of Falls  Description  Document Red Montez Fall Risk and appropriate interventions in the flowsheet.   Outcome: Progressing Towards Goal  Note: Fall Risk Interventions:  Mobility Interventions: Assess mobility with egress test, Bed/chair exit alarm, Communicate number of staff needed for ambulation/transfer, OT consult for ADLs, Patient to call before getting OOB, PT Consult for mobility concerns, PT Consult for assist device competence, Strengthening exercises (ROM-active/passive), Utilize walker, cane, or other assistive device    Mentation Interventions: Adequate sleep, hydration, pain control, Evaluate medications/consider consulting pharmacy, Eyeglasses and hearing aids, Familiar objects from home, Family/sitter at bedside, Increase mobility, More frequent rounding, Reorient patient, Room close to nurse's station, Toileting rounds, Update white board    Medication Interventions: Assess postural VS orthostatic hypotension, Bed/chair exit alarm, Evaluate medications/consider consulting pharmacy, Patient to call before getting OOB, Teach patient to arise slowly    Elimination Interventions: Bed/chair exit alarm, Call light in reach, Patient to call for help with toileting needs, Toileting schedule/hourly rounds              Problem: Patient Education: Go to Patient Education Activity  Goal: Patient/Family Education  Outcome: Progressing Towards Goal     Problem: Injury - Risk of, Adverse Drug Event  Goal: *Absence of adverse drug events  Outcome: Progressing Towards Goal  Goal: *Absence of medication errors  Outcome: Progressing Towards Goal  Goal: *Knowledge of prescribed medications  Outcome: Progressing Towards Goal     Problem: Patient Education: Go to Patient Education Activity  Goal: Patient/Family Education  Outcome: Progressing Towards Goal     Problem: Pain  Goal: *Control of Pain  Outcome: Progressing Towards Goal  Goal: *PALLIATIVE CARE:  Alleviation of Pain  Outcome: Progressing Towards Goal     Problem: Patient Education: Go to Patient Education Activity  Goal: Patient/Family Education  Outcome: Progressing Towards Goal     Problem: Pressure Injury - Risk of  Goal: *Prevention of pressure injury  Description  Document Shay Scale and appropriate interventions in the flowsheet. Outcome: Progressing Towards Goal  Note: Pressure Injury Interventions:  Sensory Interventions: Assess changes in LOC, Assess need for specialty bed, Avoid rigorous massage over bony prominences, Chair cushion, Check visual cues for pain, Discuss PT/OT consult with provider, Float heels, Keep linens dry and wrinkle-free, Maintain/enhance activity level, Minimize linen layers, Monitor skin under medical devices, Pad between skin to skin, Pressure redistribution bed/mattress (bed type), Sit a 90-degree angle/use footstool if needed, Suspension boots, Turn and reposition approx.  every two hours (pillows and wedges if needed), Use 30-degree side-lying position    Moisture Interventions: Absorbent underpads, Apply protective barrier, creams and emollients, Assess need for specialty bed, Check for incontinence Q2 hours and as needed, Internal/External urinary devices, Limit adult briefs, Maintain skin hydration (lotion/cream), Minimize layers, Moisture barrier, Offer toileting Q_hr    Activity Interventions: Assess need for specialty bed, Chair cushion, Increase time out of bed, Pressure redistribution bed/mattress(bed type), PT/OT evaluation    Mobility Interventions: Assess need for specialty bed, Float heels, HOB 30 degrees or less, Pressure redistribution bed/mattress (bed type), PT/OT evaluation, Turn and reposition approx.  every two hours(pillow and wedges)    Nutrition Interventions: Document food/fluid/supplement intake    Friction and Shear Interventions: Apply protective barrier, creams and emollients, Feet elevated on foot rest, Foam dressings/transparent film/skin sealants, HOB 30 degrees or less, Lift sheet, Lift team/patient mobility team, Minimize layers, Transfer aides:transfer board/Marta lift/ceiling lift                Problem: Patient Education: Go to Patient Education Activity  Goal: Patient/Family Education  Outcome: Progressing Towards Goal

## 2020-01-06 NOTE — PROGRESS NOTES
Hospitalist Progress Note    Patient: Nancy Rubio Age: 80 y.o. : 1922 MR#: [id number] SSN: [id number]  Date/Time: 2020 9:30 AM    DOA: 2020  PCP: Beal Pride MD    Subjective:     Caretakers at bedside,  No significant agitation, has tolerated Seroquel BID. BP is still elevated. No fever. No leukocytosis  Urine culture is pending     Cardiology evaluated. No further cardiac workup. Ok with switch to oral lasix       ROS: limited but answered no fever/chills, no headache, no dizziness, no sinus congestion, +cough  No swallowing pain, No chest pain, no palpitation, + shortness of breath, no abd pain,  No diarrhea, no urinary complaint, no leg pain or swelling      Assessment/Plan:   1. Altered mental status due to UTI, worsened her baseline Alzheimer's dementia. 2.  UTI with microhematuria, stable now. Urine culture pending   3. Hypertensive urgency, resolving   4. Chronic elevated troponin, indeterminate, with CAD, stable   5. Dyspnea on exertion, mild acute on chronic diastolic CHF         Do not suspect pulm infection  6. Atypical chest pain with cough, resolving   7. CKD3     Will continue Ceftriaxone today, pending urine Cx. Continue with IV lasix today, switch to oral tomorrow, no need for Echo. Appreciated Cardiology consult   Added hydralazine TID for BP control. ICS, OOB, PT/OT  Resume home medications as tolerated. Continue with seroquel, stop her Valium at home.  Check EKG today    DNR    Additional Notes:    Time spent >30 minutes    Case discussed with:  [x]Patient  [x]Family  [x]Nursing  []Case Management  DVT Prophylaxis:  []Lovenox  [x]Hep SQ  []SCDs  []Coumadin   []On Heparin gtt    Signed By: Lorenza Litten, MD     2020 9:30 AM              Objective:   VS:   Visit Vitals  /67   Pulse (!) 49   Temp 97 °F (36.1 °C)   Resp 18   Ht 4' 11\" (1.499 m)   Wt 59.9 kg (132 lb)   SpO2 98%   Breastfeeding No   BMI 26.66 kg/m²      Tmax/24hrs: Temp (24hrs), Av.6 °F (36.4 °C), Min:97 °F (36.1 °C), Max:98.3 °F (36.8 °C)      Intake/Output Summary (Last 24 hours) at 2020 0930  Last data filed at 2020 0815  Gross per 24 hour   Intake 440 ml   Output 300 ml   Net 140 ml       Tele: sinus  General:  Cooperative, Not in acute distress  HEENT: PERRL, EOMI, supple neck, no JVD, dry oral mucosa  Cardiovascular: S1S2 regular, no rub/gallop   Pulmonary: Clear air entry bilaterally, no wheezing, ++ crackle  GI:  Soft, non tender, non distended, +bs, no guarding   Extremities:  No pedal edema, +distal pulses appreciated   Neuro: AOx2    Additional:       Current Facility-Administered Medications   Medication Dose Route Frequency    QUEtiapine (SEROquel) tablet 25 mg  25 mg Oral BID    aspirin delayed-release tablet 81 mg  81 mg Oral DAILY    atorvastatin (LIPITOR) tablet 20 mg  20 mg Oral DAILY    cloNIDine HCl (CATAPRES) tablet 0.2 mg  0.2 mg Oral BID    clopidogrel (PLAVIX) tablet 75 mg  75 mg Oral DAILY    famotidine (PEPCID) tablet 20 mg  20 mg Oral DAILY    acetaminophen (TYLENOL) tablet 650 mg  650 mg Oral Q6H PRN    albuterol (ACCUNEB) nebulizer solution 1.25 mg  1.25 mg Nebulization Q6H PRN    promethazine (PHENERGAN) 12.5 mg in 0.9% sodium chloride 50 mL IVPB  12.5 mg IntraVENous Q6H PRN    furosemide (LASIX) injection 20 mg  20 mg IntraVENous BID    cefTRIAXone (ROCEPHIN) 1 g in sterile water (preservative free) 10 mL IV syringe  1 g IntraVENous Q24H    memantine ER (NAMENDA XR) capsule 21 mg  21 mg Oral DAILY    heparin (porcine) injection 5,000 Units  5,000 Units SubCUTAneous Q8H            Lab/Data Review:  Labs: Results:       Chemistry Recent Labs     20  0520 20  1252   * 191*    140   K 4.4 4.7    106   CO2 29 29   BUN 25* 25*   CREA 1.85* 1.85*   BUCR 14 14   AGAP 6 5   CA 9.0 9.5   PHOS 4.7  --      No results for input(s): TBIL, ALT, SGOT, ALKP, TP, ALB, GLOB, AGRAT in the last 72 hours.    CBC w/Diff Recent Labs     01/06/20  0520 01/05/20  1252   WBC 9.2 10.7   RBC 4.18* 4.36   HGB 11.3* 12.0   HCT 36.1 38.0   MCV 86.4 87.2   MCH 27.0 27.5   MCHC 31.3 31.6   RDW 15.1* 15.1*    208   GRANS  --  81*   LYMPH  --  14*   EOS  --  1      Coagulation No results for input(s): PTP, INR, APTT, INREXT in the last 72 hours. Iron/Ferritin No results found for: IRON, FE, TIBC, IBCT, PSAT, FERR    BNP    Cardiac Enzymes Lab Results   Component Value Date/Time    CK 73 01/05/2020 12:52 PM    CK - MB 2.1 01/05/2020 12:52 PM    CK-MB Index 2.9 01/05/2020 12:52 PM    Troponin-I, QT 0.54 (H) 01/05/2020 06:40 PM        Lactic Acid    Thyroid Studies          All Micro Results     Procedure Component Value Units Date/Time    CULTURE, URINE [[id number]] Collected:  01/05/20 1325    Order Status:  Completed Specimen:  Cath Urine Updated:  01/05/20 1832            Images:    CT (Most Recent). XRAY (Most Recent) XR Results (most recent):  Results from Hospital Encounter encounter on 01/05/20   XR CHEST PORT    Narrative AP CHEST, PORTABLE    CPT CODE: 81428    INDICATION: Above. Chest pain and shortness of breath. COMPARISON: 12/2/2019. TECHNIQUE: Portable  AP chest radiograph is reviewed. FINDINGS:    Lung volumes are slightly lower compared to the prior study with some associated  relative accentuation of the lung markings. Pulmonary vascular congestion, with  mild hazy increased opacity which may reflect mild predominantly interstitial  pulmonary edema. The left hemidiaphragm/costophrenic sulcus is poorly visualized  suspicious for retrocardiac opacity due to atelectasis/airspace disease and/or  possible pleural effusion, assessment limited by cardiomegaly and  underpenetration. Follow-up PA and lateral chest radiograph might be helpful  when clinically feasible as clinically warranted. No evidence of pneumothorax. The right costophrenic sulcus appears sharp.      The cardiac silhouette remains enlarged. Aortic atherosclerotic calcification  noted best seen at and distal to the level of the aortic arch. Metallic clasp-like and zipper-like densities overlie the patient likely related  to clothing. Impression IMPRESSION:     Pulmonary vascular congestion with possible mild predominantly interstitial  pulmonary edema as described. Assessment of the left lung base is limited by  cardiomegaly and underpenetration, with poor visualization of the left  hemidiaphragm suspicious for retrocardiac opacity due to atelectasis/airspace  disease and possible pleural effusion as described. Cardiomegaly. EKG Results for orders placed or performed in visit on 06/13/19   AMB POC EKG ROUTINE W/ 12 LEADS, INTER & REP     Status: None    Narrative    Read by Millicent Khan MD - Sinus bradycardia. RBBB and right axis - possible right ventricular hypertrophy. Consider pulmonary disease. T-abnormality. Possible anterolateral and inferior ischemia.         2D ECHO

## 2020-01-06 NOTE — PROGRESS NOTES
Reason for Admission:   UTI (urinary tract infection) [N39.0]  Altered mental status [R41.82]  CHF, acute on chronic (HCC) [I50.9]  Dyspnea [R06.00]               RRAT Score:   31             Resources/supports as identified by patient/family:  dipti Aguilera ([phone]) and lives with grandsonBarron. Has private duty & pay aide from 9 am -10 pm.    Top Challenges facing patient (as identified by patient/family and CM): Finances/Medication cost?     n/a  Transportation     Grandson or aide will provide transportation. Support system or lack thereof? Good support  Living arrangements? Lives with grandson  Self-care/ADLs/Cognition? Needs assistance and alert to person. Current Advanced Directive/Advance Care Plan:   yes                          Plan for utilizing home health: To be determined                      Likelihood of readmission:   HIGH    Transition of Care Plan:                    Initial assessment completed with dipti Aguilera. Cognitive status of patient: oriented to person. Face sheet information confirmed:  yes. dipti Aguilera( [phone]) to participate in her discharge plan and to receive any needed information. This patient lives in a single family home with grandson, Barron Knight. Patient was able to navigate steps as needed with assistance. Prior to hospitalization, patient was considered to be independent with ADLs/IADLS : no . If not independent,  patient needs assist with : dressing, bathing, food preparation, cooking, toileting and grooming. Has private duty aide from 9 am to 10 pm.     Patient has a current ACP document on file: yes  The patient's grandson or aide will be available to transport patient home upon discharge. The patient already has [employer], and wheelchair (8years old per son) medical equipment available in the home. Patient is not currently active with home health.    Patient has not stayed in a skilled nursing facility or rehab. This patient is on dialysis :no     Freedom of choice signed: no.     Currently, the discharge plan is  To be determined. - Will monitor PT/OT notes for recommendations. CM will continue to monitor for transitional needs. Patient's current insurance is Medicare part 55 [employer]  PCP Verified by CM: Yes(Per son, saw pcp last year (2019). )  Mode of Transport at Discharge: Self  Transition of [address] (CM Consult): Discharge Planning  MyChart Signup: No  Discharge Durable Medical Equipment: No  Physical Therapy Consult: Yes  Occupational Therapy Consult: Yes  Speech Therapy Consult: No  Current Support Network: Relative's Home  Confirm Follow Up Transport: Family  The Patient and/or Patient Representative was Provided with a Choice of Provider and Agrees with the Discharge Plan?: No  Freedom of Choice List was Provided with Basic Dialogue that Supports the Patient's Individualized Plan of Care/Goals, Treatment Preferences and Shares the Quality Data Associated with the Providers?: No  Discharge Location  Discharge Placement: (To be deteremined)        FRIEDA OlivasN, RN  Pager # [phone]  Care Manager

## 2020-01-06 NOTE — CONSULTS
[facility] - Consult Note    Consultation request by Johnie Ramires MD for advice/opinion related to evaluating UTI (urinary tract infection) [N39.0]  Altered mental status [R41.82]  CHF, acute on chronic ([facility] [I50.9]  Dyspnea [R06.00]    Date of  Admission: 1/5/2020 12:03 PM   Primary Care Physician:  Alfonso Mauricio MD     Assessment:     Patient Active Problem List   Diagnosis Code    Dementia ([facility] F03.90    HTN (hypertension) I10    CRI (chronic renal insufficiency) N18.9    Dyslipidemia E78.5    Acute CHF (congestive heart failure) ([facility] I50.9    CHF exacerbation (HCC) I50.9    UTI (urinary tract infection) N39.0    Altered mental status R41.82    Dyspnea R06.00    CHF, acute on chronic (HCC) I50.9     -Baseline dementia with mental status changes different from her normal baseline; now improving. On abx for presumed UTi  - Elevated troponin indeterminate in setting of acute heart failure. Patient denies chest pain, EKG reveals chronic RBBB, with no acute ischemic changes and troponin's are consistently high. This level lower than historical levels  - Essential Hypertension - Elevated on arrival, sub-optimal control  - Hyperlipidemia - On statin  - History of CVA (2015)  - Chronic Renal Insufficiency - Admission SCr 1.9, baseline 1.5-1.6  - History of Dementia - On namenda  - Advanced age  - DNR status     Primary cardiologist Dr. Kennedi Elias, and recently seen by Dr. Nano Mercedes also        Plan:     Independently seen and evaluated. Agree with below. She appears quite stable from cardiac standpoint. She denies any chest pains or increased shortness of breath. She has baseline chronic renal insufficiency; she does not appear in florid pulmonary edema. As noted below, would not aggressively diurese. She does have elevated BNP but likely has that at baseline. Continue conservative measures.    -Patient with mild rales at bases but appears comfortable and has started to diurese. -With advanced age we will be careful with IV diuretics and switch to po as she does not appear to be significantly overloaded. -Can monitor fluid intake to help with this.  -Indeterminate troponin likely demand ischemia from chronic heart failure and her acute sepsis/UTI. Would not give heparin for this and do not suspect ACS. Conservative management will be done. No echo is needed.  -Continue with ASA, statin, Plavix, clonidine and adjust as necessary for BP control. Reasonable to shoot for 864-463 systolic range. -More recommendations pending clinical course.  -Will follow. History of Present Illness: This is a 80 y.o. female admitted for UTI (urinary tract infection) [N39.0]  Altered mental status [R41.82]  CHF, acute on chronic (HCC) [I50.9]  Dyspnea [R06.00]. Patient complains of:  Patient with change in mental status and agitation since yesterday. No other changes noted by caregiver. She has been getting all of her meds, no fevers, nausea, vomiting or other symptoms. Appetite has been very good per provider. Cardiac risk factors: dyslipidemia, sedentary life style, hypertension, post-menopausal, age      Review of Symptoms:  Except as stated above include:  Constitutional:  negative  Respiratory:  negative  Cardiovascular:  negative  Gastrointestinal: negative  Genitourinary:  negative  Musculoskeletal:  Negative  Neurological:  Negative  Dermatological:  Negative  Endocrinological: Negative  Psychological:  Negative    Pertinent items are noted in HPI.      Past Medical History:     Past Medical History:   Diagnosis Date    Anemia NEC     Anxiety     Cataract     bilat    Colon cancer ([facility] 1999    CRI (chronic renal insufficiency) 4/07    CVA (cerebral infarction) 4/01    right facial droop    CVA (cerebral infarction) 2/11    left post corona radiata    Dementia ([facility]     Diverticulosis     Edema     GERD (gastroesophageal reflux disease)     Hypercholesterolemia     Hypertension     Hypomagnesemia 2/11    Lipoma of neck     right    Orthostatic hypotension     RBBB (right bundle branch block)     Syncope 6/08    TIA (transient ischemic attack) mid 1990's         Social History:     Social History     Socioeconomic History    Marital status:      Spouse name: Not on file    Number of children: Not on file    Years of education: Not on file    Highest education level: Not on file   Tobacco Use    Smoking status: Never Smoker    Smokeless tobacco: Never Used   Substance and Sexual Activity    Alcohol use: No    Sexual activity: Never        Family History:   No family history on file. Medications:      Allergies   Allergen Reactions    Shellfish Derived Hives and Nausea and Vomiting        Current Facility-Administered Medications   Medication Dose Route Frequency    QUEtiapine (SEROquel) tablet 25 mg  25 mg Oral BID    aspirin delayed-release tablet 81 mg  81 mg Oral DAILY    atorvastatin (LIPITOR) tablet 20 mg  20 mg Oral DAILY    cloNIDine HCl (CATAPRES) tablet 0.2 mg  0.2 mg Oral BID    clopidogrel (PLAVIX) tablet 75 mg  75 mg Oral DAILY    famotidine (PEPCID) tablet 20 mg  20 mg Oral DAILY    acetaminophen (TYLENOL) tablet 650 mg  650 mg Oral Q6H PRN    albuterol (ACCUNEB) nebulizer solution 1.25 mg  1.25 mg Nebulization Q6H PRN    promethazine (PHENERGAN) 12.5 mg in 0.9% sodium chloride 50 mL IVPB  12.5 mg IntraVENous Q6H PRN    furosemide (LASIX) injection 20 mg  20 mg IntraVENous BID    cefTRIAXone (ROCEPHIN) 1 g in sterile water (preservative free) 10 mL IV syringe  1 g IntraVENous Q24H    memantine ER (NAMENDA XR) capsule 21 mg  21 mg Oral DAILY    heparin (porcine) injection 5,000 Units  5,000 Units SubCUTAneous Q8H         Physical Exam:     Visit Vitals  /67   Pulse (!) 49   Temp 97 °F (36.1 °C)   Resp 18   Ht 4' 11\" (1.499 m)   Wt 59.9 kg (132 lb)   SpO2 98%   Breastfeeding No   BMI 26.66 kg/m²     BP Readings from Last 3 Encounters:   01/06/20 194/67   12/19/19 122/64   08/22/19 142/58     Pulse Readings from Last 3 Encounters:   01/06/20 (!) 49   12/19/19 (!) 56   08/22/19 (!) 52     Wt Readings from Last 3 Encounters:   01/05/20 59.9 kg (132 lb)   12/19/19 57.6 kg (127 lb)   08/22/19 55.3 kg (122 lb)       General:  alert, cooperative, no distress, appears stated age, pleasantly demented  Neck:  nontender, no carotid bruit, no JVD  Lungs:  Mild rales R base, L base  Heart:  regular rate and rhythm, S1, S2 normal, no murmur, click, rub or gallop  Abdomen:  abdomen is soft without significant tenderness, masses, organomegaly or guarding  Extremities:  extremities normal, atraumatic, no cyanosis or edema  Skin: Warm and dry.  no hyperpigmentation, vitiligo, or suspicious lesions  Neuro: no focal neurological deficits, moves all extremities well, no involuntary movements  Psych: non focal     Data Review:     Recent Labs     01/06/20  0520 01/05/20  1252   WBC 9.2 10.7   HGB 11.3* 12.0   HCT 36.1 38.0    208     Recent Labs     01/06/20  0520 01/05/20  1252    140   K 4.4 4.7    106   CO2 29 29   * 191*   BUN 25* 25*   CREA 1.85* 1.85*   CA 9.0 9.5   MG 2.2  --    PHOS 4.7  --        Results for orders placed or performed during the hospital encounter of 01/05/20   EKG, 12 LEAD, INITIAL   Result Value Ref Range    Ventricular Rate 85 BPM    Atrial Rate 85 BPM    P-R Interval 146 ms    QRS Duration 116 ms    Q-T Interval 400 ms    QTC Calculation (Bezet) 476 ms    Calculated P Axis 66 degrees    Calculated R Axis 17 degrees    Calculated T Axis -17 degrees    Diagnosis       Normal sinus rhythm  Incomplete right bundle branch block  Nonspecific T wave abnormality  Abnormal ECG  When compared with ECG of 05-JUN-2019 10:13,  Questionable change in QRS axis  Non-specific change in ST segment in Anterior leads     Results for orders placed or performed in visit on 06/13/19   AMB POC EKG ROUTINE W/ 12 LEADS, INTER & REP    Narrative    Read by Loyda Peralta MD - Sinus bradycardia. RBBB and right axis - possible right ventricular hypertrophy. Consider pulmonary disease. T-abnormality. Possible anterolateral and inferior ischemia.        All Cardiac Markers in the last 24 hours:    Lab Results   Component Value Date/Time    CPK 73 01/05/2020 12:52 PM    CKMB 2.1 01/05/2020 12:52 PM    CKND1 2.9 01/05/2020 12:52 PM    TROIQ 0.54 (H) 01/05/2020 06:40 PM    TROIQ 0.34 (H) 01/05/2020 12:52 PM       Last Lipid:    Lab Results   Component Value Date/Time    Cholesterol, total 123 06/06/2019 05:38 AM    HDL Cholesterol 62 (H) 06/06/2019 05:38 AM    LDL, calculated 44.8 06/06/2019 05:38 AM    Triglyceride 81 06/06/2019 05:38 AM    CHOL/HDL Ratio 2.0 06/06/2019 05:38 AM       Signed By: BARRINGTON Bhat     January 6, 2020

## 2020-01-06 NOTE — CDMP QUERY
Pt admitted with UTI and noted in H&P to have  \". Altered mental status, worsened from baseline Alzheimer's dementia\"      If possible, please document in the progress notes and d/c summary if you are evaluating and / or treating any of the following:     Metabolic Encephalopathy in setting of baseline Dementia   Dementia only, no Encephalopathy    Other Encephalopathy   Other, please specify   Clinically unable to determine    The medical record reflects the following:       Risk Factors: 79 yo with UTI, PMH Alzheimer's dementia, multiple chronic medical conditions       Clinical Indicators: Worsened agitation per caretaker. According to her caretaker, she has been more agitated over the last few days. H&P- \"She will be admitted for Altered mental status associated with UTI\"       Treatment:  Iv abx for UTI    Thank you,  [facility]

## 2020-01-06 NOTE — ROUTINE PROCESS
TRANSFER - OUT REPORT:    Verbal report given to [facility] (name) on [address]  being transferred to [facility] (unit) for routine progression of care       Report consisted of patients Situation, Background, Assessment and   Recommendations(SBAR). Information from the following report(s) SBAR was reviewed with the receiving nurse. Opportunity for questions and clarification was provided.       Patient transported with:   tech

## 2020-01-06 NOTE — PROGRESS NOTES
NUTRITION    Nursing Referral: [facility]     RECOMMENDATIONS / PLAN:     - Decrease Ensure Enlive to once daily, TID not indicated based on nutritional needs and current meal intake. - Continue RD inpatient monitoring and evaluation. NUTRITION INTERVENTIONS & DIAGNOSIS:     - Meals/snacks: modified composition  - Medical food supplement therapy: modify    Nutrition Diagnosis: Unintended weight loss related to predicted inadequate energy needs as evidenced by 11% weight loss over the last 8 months    ASSESSMENT:     Admitted with AMS and UTI. Unable to obtain hx from pt. Per nursing, pt ate almost all of breakfast and tolerating it well; did not consume Ensure yet. Noted weight loss per chart hx. Nutritional intake adequate to meet patients estimated nutritional needs:  Yes    Diet: DIET CARDIAC Regular  DIET NUTRITIONAL SUPPLEMENTS All Meals, Breakfast, Lunch, Dinner; ENSURE ENLIVE      Food Allergies: shellfish  Current Appetite: Unknown  Appetite/meal intake prior to admission: Unknown  Feeding Limitations:  [] Swallowing difficulty    [] Chewing difficulty    [] Other:  Current Meal Intake: Patient Vitals for the past 100 hrs:   % Diet Eaten   01/06/20 0815 80 %       BM: 1/5  Skin Integrity: dark spot to bottom per nurse  Edema:   [x] No     [] Yes   Pertinent Medications: Reviewed: famotidine, lasix, lactobacillus, promethazine    Recent Labs     01/06/20  0520 01/05/20  1252    140   K 4.4 4.7    106   CO2 29 29   * 191*   BUN 25* 25*   CREA 1.85* 1.85*   CA 9.0 9.5   MG 2.2  --    PHOS 4.7  --        Intake/Output Summary (Last 24 hours) at 1/6/2020 1022  Last data filed at 1/6/2020 0815  Gross per 24 hour   Intake 440 ml   Output 300 ml   Net 140 ml       Anthropometrics:  Ht Readings from Last 1 Encounters:   01/05/20 4' 11\" (1.499 m)     Last 3 Recorded Weights in this Encounter    01/05/20 1230   Weight: 59.9 kg (132 lb)     Body mass index is 26.66 kg/m².        Weight History: -11% x 8 months  Weight Metrics 1/5/2020 12/19/2019 8/22/2019 6/13/2019 6/11/2019 5/1/2018 5/7/2013   Weight 132 lb 127 lb 122 lb 116 lb 121 lb 14.6 oz 148 lb 180 lb   BMI 26.66 kg/m2 25.65 kg/m2 24.64 kg/m2 23.43 kg/m2 24.62 kg/m2 27.07 kg/m2 32.91 kg/m2        Admitting Diagnosis: UTI (urinary tract infection) [N39.0]  Altered mental status [R41.82]  CHF, acute on chronic (HCC) [I50.9]  Dyspnea [R06.00]  Pertinent PMHx: anxiety, colon cancer, CVA, dementia, diverticulosis, GERD, hypercholesterolemia, HTN, hypomagnesemia, TIA    Education Needs:        [x] None identified  [] Identified - Not appropriate at this time  []  Identified and addressed - refer to education log  Learning Limitations:   [] None identified  [x] Identified: dementia, AMS    Cultural, Rastafarian & ethnic food preferences:  [x] None identified    [] Identified and addressed     ESTIMATED NUTRITION NEEDS:     Calories: 4006-5997 kcal (MSJx1.2-1.3) based on  [x] Actual BW 60 kg     [] IBW   Protein: 48-60 gm (0.8-1 gm/kg) based on  [x] Actual BW      [] IBW   Fluid: 1 mL/kcal     MONITORING & EVALUATION:     Nutrition Goal(s):   - PO nutrition intake will continue to meet >75% of patients estimated nutritional needs over the next 7 days. Outcome: New/Initial goal     Monitoring:   [x] Food and nutrient intake   [x] Food and nutrient administration  [x] Comparative standards   [x] Nutrition-focused physical findings   [x] Anthropometric Measurements   [x] Treatment/therapy   [x] Biochemical data, medical tests, and procedures        Previous Recommendations (for follow-up assessments only):  Not Applicable     Discharge Planning: No nutritional discharge needs at this time. Participated in care planning, discharge planning, & interdisciplinary rounds as appropriate.       Lachelle Owusu RD, [address] [place] [address]   Pager: [phone]

## 2020-01-06 NOTE — ROUTINE PROCESS
TRANSFER - IN REPORT:    Verbal report received from PATT Mcfadden(name) on [address]  being received from ER(unit) for routine progression of care      Report consisted of patients Situation, Background, Assessment and   Recommendations(SBAR). Information from the following report(s) SBAR, Kardex, ED Summary, Intake/Output, MAR and Recent Results was reviewed with the receiving nurse. Opportunity for questions and clarification was provided. Assessment completed upon patients arrival to unit and care assumed. Primary Nurse Harleen Antoine RN and PATT Smith, RN performed a dual skin assessment on this patient Impairment noted- see wound doc flow sheet  Shay score is 14    Bedside and Verbal shift change report given to [address] (oncoming nurse) by me (offgoing nurse). Report included the following information SBAR, Kardex, ED Summary, Intake/Output, MAR and Recent Results.

## 2020-01-06 NOTE — PROGRESS NOTES
Problem: Mobility Impaired (Adult and Pediatric)  Goal: *Acute Goals and Plan of Care (Insert Text)  Description  Physical Therapy Goals  Initiated 1/6/2020 and to be accomplished within 7 day(s)  1. Patient will move from supine to sit and sit to supine  and scoot up and down in bed with minimal assistance/contact guard assist.     2.  Patient will transfer from bed to chair and chair to bed with minimal assistance/contact guard assist using the least restrictive device. 3.  Patient will perform sit to stand with minimal assistance/contact guard assist.  4.  Patient will ambulate with minimal assistance/contact guard assist for 50 feet with the least restrictive device. PLOF: Patient family member/caregiver reports pt was ambulating with RW and SBA. Outcome: Progressing Towards Goal    PHYSICAL THERAPY EVALUATION    Patient: Gonsalo Villa (72 y.o. female)  Date: 1/6/2020  Primary Diagnosis: UTI (urinary tract infection) [N39.0]  Altered mental status [R41.82]  CHF, acute on chronic (HCC) [I50.9]  Dyspnea [R06.00]        Precautions:  Fall, Skin      ASSESSMENT : RN cleared pt to participate in therapy session. Pt seen in conjunction with OT to increase pt safety and mobility. Pts family member/caregiver present at bedside and able to provide therapists with patients PLOF. She reports pt needs assistance for most ADL's and is usually oriented to self and place. Pt's family member states it is not a good time for pt to get OOB, due to increased confusion/agitation, although pt observed to be calm lying in bed. Educated patient/family on role of PT in hospital setting. Will place on a 3 day trial to determine if pt is appropriate and assess progress towards goal.    Patient will benefit from skilled intervention to address the above impairments.   Patient's rehabilitation potential is considered to be Guarded  Factors which may influence rehabilitation potential include:   []         None noted  [x] Mental ability/status  [x]         Medical condition  []         Home/family situation and support systems  [x]         Safety awareness  []         Pain tolerance/management  []         Other:      PLAN :  Recommendations and Planned Interventions:   [x]           Bed Mobility Training             []    Neuromuscular Re-Education  [x]           Transfer Training                   []    Orthotic/Prosthetic Training  [x]           Gait Training                          []    Modalities  [x]           Therapeutic Exercises           []    Edema Management/Control  [x]           Therapeutic Activities            [x]    Family Training/Education  [x]           Patient Education  []           Other (comment):    Frequency/Duration: Patient will be followed by physical therapy 1-2 times per day/4-7 days per week to address goals.   Discharge Recommendations: home with family assist   Further Equipment Recommendations for Discharge: N/A     SUBJECTIVE:   Patient stated  I want to get to the bathroom\"    OBJECTIVE DATA SUMMARY:     Past Medical History:   Diagnosis Date    Anemia NEC     Anxiety     Cataract     bilat    Colon [facility] ([facility] 1999    CRI (chronic renal insufficiency) 4/07    CVA (cerebral infarction) 4/01    right facial droop    CVA (cerebral infarction) 2/11    left post corona radiata    Dementia ([facility]     Diverticulosis     Edema     GERD (gastroesophageal reflux disease)     Hypercholesterolemia     Hypertension     Hypomagnesemia 2/11    Lipoma of neck     right    Orthostatic hypotension     RBBB (right bundle branch block)     Syncope 6/08    TIA (transient ischemic attack) mid 1990's     Past Surgical History:   Procedure Laterality Date    ENDOSCOPY, COLON, DIAGNOSTIC  2006    HX BREAST BIOPSY      HX CATARACT REMOVAL  10/02    left    HX CATARACT REMOVAL  6/03    right    HX COLECTOMY  1999    right hemicolectomy    HX HYSTERECTOMY      HX LIPOMA RESECTION Barriers to Learning/Limitations: yes;  cognitive and altered mental status (i.e.Sedation, Confusion)  Compensate with: Visual Cues, Verbal Cues, and Tactile Cues  Home Situation:  Home Situation  Home Environment: Private residence  One/Two Story Residence: One story  Living Alone: No  Support Systems: Family member(s)  Patient Expects to be Discharged to[de-identified] Private residence  Current DME Used/Available at Home: Raised toilet seat, Walker, rolling  Tub or Shower Type: (sink bath)  Critical Behavior:  Neurologic State: Alert;Confused  Orientation Level: Disoriented X4(Per family member, pt typically oriented ro self and place)  Cognition: Decreased attention/concentration  Safety/Judgement: Fall prevention;Decreased awareness of need for assistance;Decreased awareness of environment    Strength:    Strength: Generally decreased, functional(observed as pt pulling up on B hand rails)    Tone & Sensation:   Sensation: Intact(pt observed withdrawing from touch of family member in room)     Functional Mobility:  Bed Mobility:  Supine to Sit: (pt would require modA for pull to long sit)       Activity Tolerance:   Limited  Please refer to the flowsheet for vital signs taken during this treatment. After treatment:   []         Patient left in no apparent distress sitting up in chair  [x]         Patient left in no apparent distress in bed  [x]         Call bell left within reach  [x]         Nursing notified  []         Caregiver present  []         Bed alarm activated  []         SCDs applied    COMMUNICATION/EDUCATION:   [x]         Role of Physical Therapy in the acute care setting. [x]         Fall prevention education was provided and the patient/caregiver indicated understanding. [x]         Patient/family have participated as able in goal setting and plan of care. []         Patient/family agree to work toward stated goals and plan of care.   []         Patient understands intent and goals of therapy, but is neutral about his/her participation. []         Patient is unable to participate in goal setting/plan of care: ongoing with therapy staff.  []         Other:     Thank you for this referral.  Afshin Rausch, PT   Time Calculation: 20 mins      Eval Complexity: History: MEDIUM  Complexity : 1-2 comorbidities / personal factors will impact the outcome/ POC Exam:LOW Complexity : 1-2 Standardized tests and measures addressing body structure, function, activity limitation and / or participation in recreation  Presentation: LOW Complexity : Stable, uncomplicated  Clinical Decision Making:Low Complexity    Overall Complexity:LOW

## 2020-01-06 NOTE — PROGRESS NOTES
Problem: Self Care Deficits Care Plan (Adult)  Goal: *Acute Goals and Plan of Care (Insert Text)  Description  Occupational Therapy Goals  Initiated 1/6/2020 within 7 day(s). 1.  Patient will perform self-feeding with supervision/set-up. 2.  Patient will perform grooming tasks with minimal assistance/contact guard assist.  3.  Patient will perform bed mobility in preparation for selfcare with minimal assistance/contact guard assist.  4.  Patient will perform toilet transfers with minimal assistance/contact guard assist.  5.  Patient will perform all aspects of toileting with minimal assistance/contact guard assist.      Prior Level of Function: Pt confused during evaluation and unable to give PLOF. Per patient's aunt/caregiver, patient able to feed self independently but receives assist for all other selfcare tasks bathing/dressing. Pt also used a RW and was \"stand by assist\". Outcome: Progressing Towards Goal     OCCUPATIONAL THERAPY EVALUATION    Patient: Raymondo Goodpasture (89 y.o. female)  Date: 1/6/2020  Primary Diagnosis: UTI (urinary tract infection) [N39.0]  Altered mental status [R41.82]  CHF, acute on chronic (HCC) [I50.9]  Dyspnea [R06.00]  Precautions:  Fall, Skin    ASSESSMENT :  Pt cleared to participate in OT evaluation by RN. Upon entering the room, the pt was supine in bed, alert, and agreeable to participate in OT evaluation with aunt/caregiver present. PLOF obtained from caregiver as pt confused at this time. Pt was seen with PT to maximize pt safety and participation. After PLOF interview, patient observed pulling on B hand rails with arms trying to sit up. Patient's caregiver refusing mobility at this time stating Cipriano Cabrera is more agitated this afternoon\" as pt observed to be calm, yet confused at this time.  Based on the objective data described below, the patient presents with decreased endurance, decreased independence, decreased safety awareness, decreased functional balance, and decreased functional mobility, which impedes pt performance in basic self-care/ADL tasks. Pt would benefit from a 3 day trial of skilled OT for progression toward goals, active participation, and learning/carry-over ability. Patient will benefit from skilled intervention to address the above impairments. Patient's rehabilitation potential is considered to be Good  Factors which may influence rehabilitation potential include:   []             None noted  [x]             Mental ability/status  [x]             Medical condition  [x]             Home/family situation and support systems  [x]             Safety awareness  []             Pain tolerance/management  []             Other:      PLAN :  Recommendations and Planned Interventions:   [x]               Self Care Training                  [x]      Therapeutic Activities  [x]               Functional Mobility Training   [x]      Cognitive Retraining  [x]               Therapeutic Exercises           [x]      Endurance Activities  [x]               Balance Training                    [x]      Neuromuscular Re-Education  []               Visual/Perceptual Training     [x]      Home Safety Training  [x]               Patient Education                   [x]      Family Training/Education  []               Other (comment):    Frequency/Duration: Patient will be followed by occupational therapy 1-2 times per day/4-7 days per week to address goals.   Discharge Recommendations: [facility] vs [place] with 24/7 Assistance pending pt progress  Further Equipment Recommendations for Discharge: Pt has all DME     SUBJECTIVE:   Patient stated Abi Vlades want to get up    OBJECTIVE DATA SUMMARY:     Past Medical History:   Diagnosis Date    Anemia NEC     Anxiety     Cataract     bilat    Colon cancer ([facility] [address] 1999    CRI (chronic renal insufficiency) 4/07    CVA (cerebral infarction) 4/01    right facial droop    CVA (cerebral infarction) 2/11    left post corona radiata    Dementia [facility])     Diverticulosis     Edema     GERD (gastroesophageal reflux disease)     Hypercholesterolemia     Hypertension     Hypomagnesemia 2/11    Lipoma of neck     right    Orthostatic hypotension     RBBB (right bundle branch block)     Syncope 6/08    TIA (transient ischemic attack) mid 1990's     Past Surgical History:   Procedure Laterality Date    ENDOSCOPY, COLON, DIAGNOSTIC  2006    HX BREAST BIOPSY      HX CATARACT REMOVAL  10/02    left    HX CATARACT REMOVAL  6/03    right    HX COLECTOMY  1999    right hemicolectomy    HX HYSTERECTOMY      HX LIPOMA RESECTION       Barriers to Learning/Limitations: yes;  altered mental status (i.e.Sedation, Confusion)  Compensate with: visual, verbal, tactile, kinesthetic cues/model    Home Situation:   Home Situation  Home Environment: Private residence  One/Two Story Residence: One story  Living Alone: No  Support Systems: Family member(s)  Patient Expects to be Discharged to[de-identified] Private residence  Current DME Used/Available at Home: Raised toilet seat, Walker, rolling  Tub or Shower Type: (sink bath)  []  Right hand dominant   []  Left hand dominant    Cognitive/Behavioral Status:  Neurologic State: Alert;Confused  Orientation Level: Disoriented X4(Per family member, pt typically oriented ro self and place)  Cognition: Decreased attention/concentration  Safety/Judgement: Fall prevention;Decreased awareness of need for assistance;Decreased awareness of environment    Skin: Visible skin Intact  Edema: None noted    Vision/Perceptual:    Acuity: (continue to assess)      Coordination: BUE  Fine Motor Skills-Upper: Left Intact; Right Intact    Gross Motor Skills-Upper: Left Intact; Right Intact    Strength: BUE  Strength: Generally decreased, functional(observed as pt pulling up on B hand rails)      Tone & Sensation: BUE  Sensation: Intact(pt observed withdrawing from touch of family member in room)      Range of Motion: BUE  AROM: Generally decreased, functional(observed as pt pulling on B hand rails)    Functional Mobility and Transfers for ADLs:  Bed Mobility:  Supine to Sit: (pt would require modA for pull to long sit)    ADL Assessment:   Feeding: Minimum assistance    Oral Facial Hygiene/Grooming: Moderate assistance    Bathing: Maximum assistance    Upper Body Dressing: Moderate assistance    Lower Body Dressing: Total assistance    Toileting: Total assistance      ADL Intervention:  Toileting  Toileting Assistance: (TotalAssist at this time, puriwick inserted)    Cognitive Retraining  Safety/Judgement: Fall prevention;Decreased awareness of need for assistance;Decreased awareness of environment      Pain:  Pain level pre-treatment: -/10   Pain Intervention(s): Medication (see MAR); Response to intervention: See doc flow    Activity Tolerance:   Decreased tolerance    Please refer to the flowsheet for vital signs taken during this treatment. After treatment:   [] Patient left in no apparent distress sitting up in chair  [x] Patient left in no apparent distress in bed  [x] Call bell left within reach  [x] Nursing notified  [x] Caregiver present  [] Bed alarm activated    COMMUNICATION/EDUCATION:   [x] Role of Occupational Therapy in the acute care setting  [x] Home safety education was provided and the patient/caregiver indicated understanding. [x] Patient/family have participated as able in goal setting and plan of care. [x] Patient/family agree to work toward stated goals and plan of care. [] Patient understands intent and goals of therapy, but is neutral about his/her participation. [] Patient is unable to participate in goal setting and plan of care. Thank you for this referral.  Sherryle Arthur, OTR/L  Time Calculation: 9 mins    Eval Complexity: History: MEDIUM Complexity : Expanded review of history including physical, cognitive and psychosocial  history ;    Examination: MEDIUM Complexity : 3-5 performance deficits relating to physical, cognitive , or psychosocial skils that result in activity limitations and / or participation restrictions; Decision Making:MEDIUM Complexity : Patient may present with comorbidities that affect occupational performnce.  Miniml to moderate modification of tasks or assistance (eg, physical or verbal ) with assesment(s) is necessary to enable patient to complete evaluation

## 2020-01-07 NOTE — PROGRESS NOTES
conducted an initial consultation and Spiritual Assessment for JOAQUIN Dumont, who is a 80 y.o.,female. Patient's Primary Language is: [place]. According to the patient's EMR Sabianism Affiliation is: Gael Hedrick.     The reason the Patient came to the hospital is:   Patient Active Problem List    Diagnosis Date Noted    UTI (urinary tract infection) 01/05/2020    Altered mental status 01/05/2020    Dyspnea 01/05/2020    CHF, acute on chronic ([facility] [address] 01/05/2020    Acute CHF (congestive heart failure) ([facility] [address] 06/05/2019    CHF exacerbation ([facility] [address] 06/05/2019    D[facility] 01/10[facility]2012    HTN (hypertension) 01/10/2012    CRI (chronic renal insufficiency) 01/10/2012    Dyslipidemia 01/10/2012        The  provided the following Interventions:  Initiated a relationship of care and support. Provided family care. Listened empathically. Provided chaplaincy education. Provided information about Spiritual Care Services. Offered prayer and assurance of continued prayers on patient's behalf. Chart reviewed. The following outcomes where achieved:   provided family care. Assessment:  Patient does not have any Caodaism/cultural needs that will affect patient's preferences in health care. There are no spiritual or Caodaism issues which require intervention at this time. Plan:  Chaplains will continue to follow and will provide pastoral care on an as needed/requested basis.  recommends bedside caregivers page  on duty if patient shows signs of acute spiritual or emotional distress.     [id number] [address]   [phone]

## 2020-01-07 NOTE — PROGRESS NOTES
Attempted to see patient for PT session, RN defers and states pt is not appropriate at this time due to agitation./confuusion.

## 2020-01-07 NOTE — PROGRESS NOTES
Hospitalist Progress Note    Patient: Vamshi Sosa Age: 80 y.o. : 1922 MR#: [id number] SSN: [id number]  Date/Time: 2020 10:45 AM    DOA: 2020  PCP: Jonathon Bliss MD    Subjective:     Caretaker at bedside, indicated that pt has been more agitated but still with recognition of her family. Has stopped seroquel this AM.   She has ativan intermittent dose since yesterday due to concern of benzodiazepine withdrawal   Bladder scan without retention  Renal function slightly elevated, lasix stopped. BP is better controlled. Urine with E.coli sensitive to ceftriaxone   No fever. No leukocytosis     Cardiology follows without further workup    ROS: limited today due to worsening confusion     Assessment/Plan:     1)  Acute metabolic encephalopathy, multifactorial (over diuresis, new medications, benzo withdrawal)  2)  Alzheimer's dementia without behavior problem   3)  E. Coli UTI with microhematuria   4)  Hypertensive urgency, resolving   5)  Chronic elevated troponin, indeterminate, with CAD, stable   6)  Dyspnea on exertion, mild acute on chronic diastolic CHF (elevated proBNP)        Do not suspect pulm infection  6. Atypical chest pain with cough, resolving   7. CKD3, will increased creatinine in the setting of diuresis        Will continue IV ceftriaxone.    Stop seroquel, trial to avoid Benzo, can have haldol, family at bedside with orientation   Hold off lasix, can encourage fluid if ok, but hold off IV fluid   Hold hydralazine and continue clonidine    ICS, OOB, PT/OT   Can resume home medications as tolerated    DNR      Additional Notes:    Time spent >30 minutes    Case discussed with:  [x]Patient  [x]Family  [x]Nursing  []Case Management  DVT Prophylaxis:  []Lovenox  [x]Hep SQ  []SCDs  []Coumadin   []On Heparin gtt    Signed By: Ryan Verma MD     2020 10:45 AM              Objective:   VS:   Visit Vitals  BP 93/57   Pulse 87   Temp 97.1 °F (36.2 °C)   Resp 20   Ht 4' 11\" (1.499 m)   Wt 59.9 kg (132 lb)   SpO2 98%   Breastfeeding No   BMI 26.66 kg/m²      Tmax/24hrs: Temp (24hrs), Av.5 °F (36.4 °C), Min:97 °F (36.1 °C), Max:98 °F (36.7 °C)      Intake/Output Summary (Last 24 hours) at 2020 1046  Last data filed at 2020 1658  Gross per 24 hour   Intake 480 ml   Output    Net 480 ml       Tele: sinus  General:  Cooperative, Not in acute distress  HEENT: PERRL, EOMI, supple neck, no JVD, dry oral mucosa  Cardiovascular: S1S2 regular, no rub/gallop   Pulmonary: Clear air entry bilaterally, no wheezing, ++ crackle  GI:  Soft, non tender, non distended, +bs, no guarding   Extremities:  No pedal edema, +distal pulses appreciated   Neuro: alert and awake     Additional:       Current Facility-Administered Medications   Medication Dose Route Frequency    guaiFENesin ER (MUCINEX) tablet 600 mg  600 mg Oral Q12H    lactobacillus sp. 50 billion cpu (BIO-K PLUS) capsule 1 Cap  1 Cap Oral DAILY    hydrALAZINE (APRESOLINE) tablet 50 mg  50 mg Oral TID    aspirin delayed-release tablet 81 mg  81 mg Oral DAILY    atorvastatin (LIPITOR) tablet 20 mg  20 mg Oral DAILY    cloNIDine HCl (CATAPRES) tablet 0.2 mg  0.2 mg Oral BID    clopidogrel (PLAVIX) tablet 75 mg  75 mg Oral DAILY    famotidine (PEPCID) tablet 20 mg  20 mg Oral DAILY    acetaminophen (TYLENOL) tablet 650 mg  650 mg Oral Q6H PRN    albuterol (ACCUNEB) nebulizer solution 1.25 mg  1.25 mg Nebulization Q6H PRN    promethazine (PHENERGAN) 12.5 mg in 0.9% sodium chloride 50 mL IVPB  12.5 mg IntraVENous Q6H PRN    cefTRIAXone (ROCEPHIN) 1 g in sterile water (preservative free) 10 mL IV syringe  1 g IntraVENous Q24H    memantine ER (NAMENDA XR) capsule 21 mg (Patient Supplied)  21 mg Oral DAILY    heparin (porcine) injection 5,000 Units  5,000 Units SubCUTAneous Q8H            Lab/Data Review:  Labs: Results:       Chemistry Recent Labs     20  0920 20  0520 20  1252   * 120* 191*    138 140   K 4.0 4.4 4.7    103 106   CO2 27 29 29   BUN 30* 25* 25*   CREA 2.61* 1.85* 1.85*   BUCR 11* 14 14   AGAP 12 6 5   CA 8.8 9.0 9.5   PHOS  --  4.7  --      Recent Labs     01/07/20  0920   ALT 20   SGOT 31   TP 7.4   ALB 2.8*   GLOB 4.6*   AGRAT 0.6*      CBC w/Diff Recent Labs     01/07/20  0920 01/06/20  0520 01/05/20  1252   WBC 11.5 9.2 10.7   RBC 4.08* 4.18* 4.36   HGB 11.1* 11.3* 12.0   HCT 35.1 36.1 38.0   MCV 86.0 86.4 87.2   MCH 27.2 27.0 27.5   MCHC 31.6 31.3 31.6   RDW 15.2* 15.1* 15.1*    253 208   GRANS  --   --  81*   LYMPH  --   --  14*   EOS  --   --  1      Coagulation No results for input(s): PTP, INR, APTT, INREXT, INREXT in the last 72 hours. Iron/Ferritin No results found for: IRON, FE, TIBC, IBCT, PSAT, FERR    BNP    Cardiac Enzymes Lab Results   Component Value Date/Time    CK 73 01/05/2020 12:52 PM    CK - MB 2.1 01/05/2020 12:52 PM    CK-MB Index 2.9 01/05/2020 12:52 PM    Troponin-I, QT 0.54 (H) 01/05/2020 06:40 PM        Lactic Acid    Thyroid Studies          All Micro Results     Procedure Component Value Units Date/Time    CULTURE, URINE [[id number]]  (Abnormal)  (Susceptibility) Collected:  01/05/20 1325    Order Status:  Completed Specimen:  Cath Urine Updated:  01/07/20 0829     Special Requests: NO SPECIAL REQUESTS        Culture result:       >100,000 COLONIES/mL ESCHERICHIA COLI                  Images:    CT (Most Recent). XRAY (Most Recent) XR Results (most recent):  Results from Hospital Encounter encounter on 01/05/20   XR CHEST PORT    Narrative AP CHEST, PORTABLE    CPT CODE: 65985    INDICATION: Above. Chest pain and shortness of breath. COMPARISON: 12/2/2019. TECHNIQUE: Portable  AP chest radiograph is reviewed. FINDINGS:    Lung volumes are slightly lower compared to the prior study with some associated  relative accentuation of the lung markings.  Pulmonary vascular congestion, with  mild hazy increased opacity which may reflect mild predominantly interstitial  pulmonary edema. The left hemidiaphragm/costophrenic sulcus is poorly visualized  suspicious for retrocardiac opacity due to atelectasis/airspace disease and/or  possible pleural effusion, assessment limited by cardiomegaly and  underpenetration. Follow-up PA and lateral chest radiograph might be helpful  when clinically feasible as clinically warranted. No evidence of pneumothorax. The right costophrenic sulcus appears sharp. The cardiac silhouette remains enlarged. Aortic atherosclerotic calcification  noted best seen at and distal to the level of the aortic arch. Metallic clasp-like and zipper-like densities overlie the patient likely related  to clothing. Impression IMPRESSION:     Pulmonary vascular congestion with possible mild predominantly interstitial  pulmonary edema as described. Assessment of the left lung base is limited by  cardiomegaly and underpenetration, with poor visualization of the left  hemidiaphragm suspicious for retrocardiac opacity due to atelectasis/airspace  disease and possible pleural effusion as described. Cardiomegaly. EKG Results for orders placed or performed in visit on 06/13/19   AMB POC EKG ROUTINE W/ 12 LEADS, INTER & REP     Status: None    Narrative    Read by Lupe Berry MD - Sinus bradycardia. RBBB and right axis - possible right ventricular hypertrophy. Consider pulmonary disease. T-abnormality. Possible anterolateral and inferior ischemia.         2D ECHO

## 2020-01-07 NOTE — PROGRESS NOTES
viewed notes/chart for pre-spiritual assessment, and pre-advance care planning assessment. Advance Care Plan on file.        [id number] [address] [place]  [phone]

## 2020-01-07 NOTE — PROGRESS NOTES
Pt has became agitated again despite new dose of seroquel. She has been on low dose valium nightly prior to presentation. Suspected diazepam withdrawal vs sundowning. Will given 1 mg IV ativan.      Catherine Alcazar MD

## 2020-01-07 NOTE — ROUTINE PROCESS
Bedside and Verbal shift change report given to [address] [place] [address] (oncoming nurse) by Sawyer Starks RN (offgoing nurse). Report included the following information SBAR, Kardex, ED Summary, OR Summary, Procedure Summary, Intake/Output and Accordion.

## 2020-01-07 NOTE — ROUTINE PROCESS
Bedside and Verbal shift change report given to [facility] (oncoming nurse) by David Zepeda RN (offgoing nurse). Report included the following information SBAR, Kardex and MAR.

## 2020-01-07 NOTE — PROGRESS NOTES
[facility] - Progress Note  Admit Date: 1/5/2020    Assessment:     Hospital Problems  Date Reviewed: 12/19/2019          Codes Class Noted POA    UTI (urinary tract infection) ICD-10-CM: N39.0  ICD-9-CM: 599.0  1/5/2020 Yes        Altered mental status ICD-10-CM: R41.82  ICD-9-CM: 780.97  1/5/2020 Yes        Dyspnea ICD-10-CM: R06.00  ICD-9-CM: 786.09  1/5/2020 Yes        CHF, acute on chronic ([facility] [address] ICD-10-CM: I50.9  ICD-9-CM: 428.0  1/5/2020 Yes        HTN (hypertension) (Chronic) ICD-10-CM: I10  ICD-9-CM: 401.9  1/10/2012 Yes              -Baseline dementia with mental status changes different from her normal baseline; now improving. On abx for presumed UTi  - Elevated troponin indeterminate in setting of acute heart failure. Patient denies chest pain, EKG reveals chronic RBBB, with no acute ischemic changes and troponin's are consistently high. This level lower than historical levels. - Heart failure with preserved EF. Patient with normal EF 55% by echo 6/2019. Diuresed with IV lasix, now with worsening renal function.  - Essential Hypertension - Elevated on arrival, sub-optimal control  - Hyperlipidemia - On statin  - History of CVA (2015)  - Chronic Renal Insufficiency - Admission SCr 1.9, baseline 1.5-1.6  - History of Dementia - On namenda  - Advanced age  - DNR status     Primary cardiologist Dr. Lucia Garcia, and recently seen by Dr. Brooke Mathew also       Plan:     Troponin remains indeterminate and not consistent with ACS. ECG without significant changes from baseline. Recommend conservative cardiac management in setting of advanced age with comorbidities. Have discussed echo with family at bedside, as it will not  and patient with significant agitation and unlikely to tolerate echo at this time, will plan to cancel echo at this time. Family agrees to not pursue echo at this time.   Patient diuresed with IV lasix, now with worsening renal function, would hold off on further diuresis, appears to be breathing comfortably without overt heart failure on exam today. Continue treatment and work up of AMS/agitation, UTI per primary team.    Subjective:     Continued confusion and agitation overnight. Objective:      Patient Vitals for the past 8 hrs:   Temp Pulse Resp BP SpO2   01/07/20 0600 97.1 °F (36.2 °C) 87 20 93/57 98 %         Patient Vitals for the past 96 hrs:   Weight   01/05/20 1230 59.9 kg (132 lb)                    Intake/Output Summary (Last 24 hours) at 1/7/2020 1056  Last data filed at 1/6/2020 1658  Gross per 24 hour   Intake 480 ml   Output    Net 480 ml       Physical Exam:  General:  no distress  Neck:  no JVD  Lungs:  clear to auscultation bilaterally  Heart:  regular rate and rhythm  Abdomen:  abdomen is soft without significant tenderness, masses, organomegaly or guarding  Extremities:  extremities normal, atraumatic, no cyanosis or edema    Data Review:     Labs: Results:       Chemistry Recent Labs     01/07/20 0920 01/06/20 0520 01/05/20  1252   * 120* 191*    138 140   K 4.0 4.4 4.7    103 106   CO2 27 29 29   BUN 30* 25* 25*   CREA 2.61* 1.85* 1.85*   CA 8.8 9.0 9.5   MG  --  2.2  --    PHOS  --  4.7  --    AGAP 12 6 5   BUCR 11* 14 14   AP 90  --   --    TP 7.4  --   --    ALB 2.8*  --   --    GLOB 4.6*  --   --    AGRAT 0.6*  --   --       CBC w/Diff Recent Labs     01/07/20 0920 01/06/20 0520 01/05/20  1252   WBC 11.5 9.2 10.7   RBC 4.08* 4.18* 4.36   HGB 11.1* 11.3* 12.0   HCT 35.1 36.1 38.0    253 208   GRANS  --   --  81*   LYMPH  --   --  14*   EOS  --   --  1      Cardiac Enzymes No results found for: CPK, CK, CKMMB, CKMB, RCK3, CKMBT, CKNDX, CKND1, FELTON, TROPT, TROIQ, SHANIKA, TROPT, TNIPOC, BNP, BNPP   Coagulation No results for input(s): PTP, INR, APTT, INREXT in the last 72 hours.     Lipid Panel Lab Results   Component Value Date/Time    Cholesterol, total 123 06/06/2019 05:38 AM    HDL Cholesterol 62 (H) 06/06/2019 05:38 AM    LDL, calculated 44.8 06/06/2019 05:38 AM    VLDL, calculated 16.2 06/06/2019 05:38 AM    Triglyceride 81 06/06/2019 05:38 AM    CHOL/HDL Ratio 2.0 06/06/2019 05:38 AM      BNP No results found for: BNP, BNPP, XBNPT   Liver Enzymes Recent Labs     01/07/20  0920   TP 7.4   ALB 2.8*   AP 90   SGOT 31      Digoxin    Thyroid Studies Lab Results   Component Value Date/Time    TSH 4.39 (H) 01/06/2020 05:20 AM          Signed By: Windsor Holstein, PA     January 7, 2020

## 2020-01-07 NOTE — PROGRESS NOTES
Received in bed awake and confused in bed family member at bedside. Pt fighting nurses, trying to get out of bed. Pt thrashing about with arms and legs. Will not calm down. Called Dr Phyllis Elena. Orders given for a one time dose of ativan 1mg iv.  And given

## 2020-01-07 NOTE — PROGRESS NOTES
Pt awoke combative and trying to get out of bed. Family member at bedside. Notified Dr Claudio Valle of events. Ordered ativan 0.5mg iv and given. Pt let nurse take 0600 vitals b/p somewhat low. Pt asymp. Also told Dr about b/p. Instructed to re-check in an hour. Report to day shift. Pt asleep with nonlabored resp . 02 at 2l via nasal cannulla.

## 2020-01-08 NOTE — PROGRESS NOTES
Problem: Mobility Impaired (Adult and Pediatric)  Goal: *Acute Goals and Plan of Care (Insert Text)  Description  Physical Therapy Goals  Initiated 1/6/2020 and to be accomplished within 7 day(s)  1. Patient will move from supine to sit and sit to supine  and scoot up and down in bed with minimal assistance/contact guard assist.     2.  Patient will transfer from bed to chair and chair to bed with minimal assistance/contact guard assist using the least restrictive device. 3.  Patient will perform sit to stand with minimal assistance/contact guard assist.  4.  Patient will ambulate with minimal assistance/contact guard assist for 50 feet with the least restrictive device. PLOF: Patient family member/caregiver reports pt was ambulating with RW and SBA. Outcome: Progressing Towards Goal      PHYSICAL THERAPY TREATMENT    Patient: Anthony Feliz (89 y.o. female)  Date: 1/8/2020  Diagnosis: UTI (urinary tract infection) [N39.0]  Altered mental status [R41.82]  CHF, acute on chronic (HCC) [I50.9]  Dyspnea [R06.00]   <principal problem not specified>       Precautions: Fall, Skin      ASSESSMENT:  Pt found supine HOB elevated alert, cooperative, and pleasant this visit willing to participate w/ therapy. Pt was able to make functional gains this visit, willing to attempt to exit bed to chair (additional time req for room set up). Pt mobilized to EOB this visit req max cueing for sequencing of UEs and LEs. Pt has difficulty following verbal commands, however responds well to TCs. W/ feet doffed to EOB, pt had great difficulty obtaining an upright sitting posture, thus additional assistance and use of HOB provided for assistance. W, improved orientation to task, balance improved at EOB, req min A to CGA, however when attempting to scoot forward for proper alignment, pt began to perform heavy post lean, req additional assistance.  Caregiver stated that pt able to overcome at home by holding onto walker, thus provided RW, assisting in ant lean. Pt performed X2 sit to stand from EOB, req max A X2, however unable to proper alignment in standing w/ post lean, thus returned to EOB. W/ reorientation to task and gait utilized on therapist, pt able to safely perform stand pivot transfer at a max A level. Pt scooted back in chair, displaying good core/trunk control sitting up. Thus, left pt in recliner on active chair alarm w/ family in room. Nursing notified of progression and recommendations. As displays increased post lean, pt will benefit from X2 assist back to bed or lift team.   Progression toward goals: good   [x]      Improving appropriately and progressing toward goals  []      Improving slowly and progressing toward goals  []      Not making progress toward goals and plan of care will be adjusted     PLAN:  Patient continues to benefit from skilled intervention to address the above impairments. Continue treatment per established plan of care. Discharge Recommendations:  home health w/ 24/7 assistance from caregiver and family   Further Equipment Recommendations for Discharge: has RW and WC, may benefit from [facility] if unable to return to PLOF prior to DC as pt unable to negotiate [facility] in bathroom at home     SUBJECTIVE:   Patient stated Ok.     OBJECTIVE DATA SUMMARY:   Critical Behavior:  Neurologic State: Confused  Orientation Level: Oriented to person  Cognition: Impaired decision making  Safety/Judgement: Fall prevention, Decreased awareness of need for assistance, Decreased awareness of environment  Functional Mobility Training:  Bed Mobility:  Rolling: Moderate assistance  Supine to Sit: Maximum assistance  Transfers:  Sit to Stand: Maximum assistance  Stand to Sit: Maximum assistance  Balance:  Sitting: Impaired; With support  Sitting - Static: Fair (occasional)  Sitting - Dynamic: Fair (occasional)  Standing: Impaired; With support  Standing - Static: Poor  Standing - Dynamic : Poor         Pain:  Did not voice any pain     Activity Tolerance:   Good   Please refer to the flowsheet for vital signs taken during this treatment. After treatment:   [x] Patient left in no apparent distress sitting up in chair  [] Patient left in no apparent distress in bed  [x] Call bell left within reach  [x] Nursing notified  [x] Caregiver present  [x] Chair alarm activated  [] SCDs applied      COMMUNICATION/EDUCATION:   [x]         Role of Physical Therapy in the acute care setting. [x]         Fall prevention education was provided and the patient/caregiver indicated understanding. [x]         Patient/family have participated as able in working toward goals and plan of care. [x]         Patient/family agree to work toward stated goals and plan of care. []         Patient understands intent and goals of therapy, but is neutral about his/her participation.   []         Patient is unable to participate in stated goals/plan of care: ongoing with therapy staff.  []         Other:        Rohan Pierre PTA   Time Calculation: 30 mins

## 2020-01-08 NOTE — PROGRESS NOTES
Bedside and Verbal shift change report given to Jessica Runner, RN (oncoming nurse) by Calista Ortiz RN (offgoing nurse). Report included the following information SBAR and Kardex.

## 2020-01-08 NOTE — PROGRESS NOTES
Bedside and Verbal shift change report given to Liseth Montilla RN (oncoming nurse) by Adam Askew RN (offgoing nurse). Report included the following information SBAR and Kardex.

## 2020-01-08 NOTE — PROGRESS NOTES
Hospitalist Progress Note    Patient: Caro Amaro Age: 80 y.o. : 1922 MR#: [id number] SSN: [id number]  Date/Time: 2020 11:32 AM    DOA: 2020  PCP: Gia Nolen MD    Subjective:     Patient found sitting in chair with her son and family member at bedside. Less agitation since last night, had Haldol dosing. Has stopped hydralazine, has stopped seroquel, has stopped ativan  Patient has bladder retention required muller placement yesterday,   No fever  BP elevated, lasix has been stopped renal function improved today    Finishing Ceftriaxone tomorrow     ROS: limited but she is able to answer no fever, no headache, no dizziness, no facial pain,   No swallowing pain, No chest pain, no palpitation, no shortness of breath, no abd pain,  No diarrhea, no urinary complaint, no leg pain or swelling    Assessment/Plan:     1)  Acute metabolic encephalopathy, multifactorial (over diuresis, new medications, benzo withdrawal)       Improved today, more calmer today       Negative acute finding on CT  2)  Alzheimer's dementia without behavior problem   3)  E. Coli UTI with microhematuria   4)  Hypertensive urgency, elevated again after stopping hydralazine   5)  Chronic elevated troponin, indeterminate, with CAD, stable   6)  Dyspnea on exertion, mild acute on chronic diastolic CHF (elevated proBNP)        Do not suspect pulm infection  6. Atypical chest pain with cough, resolving   7. CKD3, creatinine improved with stopping lasix today  8. Acute urinary retention, on muller. Will continue IV ceftriaxone. Can have haldol prn for agitation,   Spoke with son about having haldol prn at outpt Rx rather than Valium. Stop seroquel, trial to avoid Benzo, can have haldol, family at bedside with orientation   Hold off lasix, can encourage fluid if ok  Hold hydralazine and continue clonidine, can add amlodipine at night.    ICS, OOB, PT/OT   Needs to d/c muller tomorrow     DNR      Additional Notes: Time spent >30 minutes    Case discussed with:  [x]Patient  [x]Family  [x]Nursing  []Case Management  DVT Prophylaxis:  []Lovenox  [x]Hep SQ  []SCDs  []Coumadin   []On Heparin gtt     Signed By: Juliet Hopkins MD     2020 11:32 AM              Objective:   VS:   Visit Vitals  /76 (BP 1 Location: Right arm, BP Patient Position: At rest)   Pulse 86   Temp 98.5 °F (36.9 °C)   Resp 18   Ht 4' 11\" (1.499 m)   Wt 59.9 kg (132 lb)   SpO2 97%   Breastfeeding No   BMI 26.66 kg/m²      Tmax/24hrs: Temp (24hrs), Av °F (36.7 °C), Min:97 °F (36.1 °C), Max:98.7 °F (37.1 °C)      Intake/Output Summary (Last 24 hours) at 2020 1133  Last data filed at 2020 1001  Gross per 24 hour   Intake 440 ml   Output 1000 ml   Net -560 ml       Tele: sinus  General:  Cooperative, Not in acute distress  HEENT: PERRL, EOMI, supple neck, no JVD, dry oral mucosa  Cardiovascular: S1S2 regular, no rub/gallop   Pulmonary: Clear air entry bilaterally, no wheezing, ++ crackle  GI:  Soft, non tender, non distended, +bs, no guarding   Extremities:  No pedal edema, +distal pulses appreciated   Neuro: alert and awake     Additional:       Current Facility-Administered Medications   Medication Dose Route Frequency    haloperidol lactate (HALDOL) injection 3 mg  3 mg IntraVENous Q6H PRN    guaiFENesin ER (MUCINEX) tablet 600 mg  600 mg Oral Q12H    lactobacillus sp. 50 billion cpu (BIO-K PLUS) capsule 1 Cap  1 Cap Oral DAILY    aspirin delayed-release tablet 81 mg  81 mg Oral DAILY    atorvastatin (LIPITOR) tablet 20 mg  20 mg Oral DAILY    cloNIDine HCl (CATAPRES) tablet 0.2 mg  0.2 mg Oral BID    clopidogrel (PLAVIX) tablet 75 mg  75 mg Oral DAILY    famotidine (PEPCID) tablet 20 mg  20 mg Oral DAILY    acetaminophen (TYLENOL) tablet 650 mg  650 mg Oral Q6H PRN    albuterol (ACCUNEB) nebulizer solution 1.25 mg  1.25 mg Nebulization Q6H PRN    promethazine (PHENERGAN) 12.5 mg in 0.9% sodium chloride 50 mL IVPB  12.5 mg IntraVENous Q6H PRN    cefTRIAXone (ROCEPHIN) 1 g in sterile water (preservative free) 10 mL IV syringe  1 g IntraVENous Q24H    memantine ER (NAMENDA XR) capsule 21 mg (Patient Supplied)  21 mg Oral DAILY    heparin (porcine) injection 5,000 Units  5,000 Units SubCUTAneous Q8H            Lab/Data Review:  Labs: Results:       Chemistry Recent Labs     01/08/20  1018 01/07/20  0920 01/06/20 0520   * 101* 120*    142 138   K 4.0 4.0 4.4    103 103   CO2 29 27 29   BUN 34* 30* 25*   CREA 2.22* 2.61* 1.85*   BUCR 15 11* 14   AGAP 10 12 6   CA 9.5 8.8 9.0   PHOS  --   --  4.7     Recent Labs     01/07/20 0920   ALT 20   SGOT 31   TP 7.4   ALB 2.8*   GLOB 4.6*   AGRAT 0.6*      CBC w/Diff Recent Labs     01/07/20  0920 01/06/20  0520 01/05/20  1252   WBC 11.5 9.2 10.7   RBC 4.08* 4.18* 4.36   HGB 11.1* 11.3* 12.0   HCT 35.1 36.1 38.0   MCV 86.0 86.4 87.2   MCH 27.2 27.0 27.5   MCHC 31.6 31.3 31.6   RDW 15.2* 15.1* 15.1*    253 208   GRANS  --   --  81*   LYMPH  --   --  14*   EOS  --   --  1      Coagulation No results for input(s): PTP, INR, APTT, INREXT, INREXT in the last 72 hours.     Iron/Ferritin No results found for: IRON, FE, TIBC, IBCT, PSAT, FERR    BNP    Cardiac Enzymes Lab Results   Component Value Date/Time    CK 73 01/05/2020 12:52 PM    CK - MB 2.1 01/05/2020 12:52 PM    CK-MB Index 2.9 01/05/2020 12:52 PM    Troponin-I, QT 0.54 (H) 01/05/2020 06:40 PM        Lactic Acid    Thyroid Studies          All Micro Results     Procedure Component Value Units Date/Time    CULTURE, URINE [[id number]] Collected:  01/08/20 0500    Order Status:  Completed Specimen:  Urine from Pham Specimen Updated:  01/08/20 0514    CULTURE, URINE [[id number]]  (Abnormal)  (Susceptibility) Collected:  01/05/20 1325    Order Status:  Completed Specimen:  Cath Urine Updated:  01/07/20 0829     Special Requests: NO SPECIAL REQUESTS        Culture result:       >100,000 COLONIES/mL ESCHERICHIA COLI Images:    CT (Most Recent). CT Results (most recent):  Results from Hospital Encounter encounter on 01/05/20   CT HEAD WO CONT    Narrative Head CT without contrast    HISTORY: Worsening agitation  COMPARISON: CT head 2/3/2011, MRI 6/29/2013    Technique: CT images of the head were obtained. All CT scans at this facility  are performed using dose optimization technique as appropriate to the performed  exam, to include automated exposure control, adjustment of the mA and/or kV  according to patient's size (Including appropriate matching for site-specific  examinations), or use of iterative reconstruction technique. FINDINGS:  Motion degraded exam. Left parietotemporal encephalomalacia changes similar to  prior. Similar generalized volume loss. Mild periventricular white matter  hypoattenuation. No intracranial hemorrhage, extra-axial fluid collection or mass effect. No  shift of midline structures. No evidence for acute ischemia. Intact osseous structures. The visualized paranasal air sinuses are aerated. Impression IMPRESSION:    Motion degraded examination. 1. No definite evidence of acute intracranial abnormality. 2. Similar remote left parietotemporal encephalomalacia. 3. Mild volume loss and mild periventricular white matter chronic small vessel  disease. XRAY (Most Recent) XR Results (most recent):  Results from Hospital Encounter encounter on 01/05/20   XR CHEST PORT    Narrative AP CHEST, PORTABLE    CPT CODE: 02868    INDICATION: Above. Chest pain and shortness of breath. COMPARISON: 12/2/2019. TECHNIQUE: Portable  AP chest radiograph is reviewed. FINDINGS:    Lung volumes are slightly lower compared to the prior study with some associated  relative accentuation of the lung markings. Pulmonary vascular congestion, with  mild hazy increased opacity which may reflect mild predominantly interstitial  pulmonary edema.  The left hemidiaphragm/costophrenic sulcus is poorly visualized  suspicious for retrocardiac opacity due to atelectasis/airspace disease and/or  possible pleural effusion, assessment limited by cardiomegaly and  underpenetration. Follow-up PA and lateral chest radiograph might be helpful  when clinically feasible as clinically warranted. No evidence of pneumothorax. The right costophrenic sulcus appears sharp. The cardiac silhouette remains enlarged. Aortic atherosclerotic calcification  noted best seen at and distal to the level of the aortic arch. Metallic clasp-like and zipper-like densities overlie the patient likely related  to clothing. Impression IMPRESSION:     Pulmonary vascular congestion with possible mild predominantly interstitial  pulmonary edema as described. Assessment of the left lung base is limited by  cardiomegaly and underpenetration, with poor visualization of the left  hemidiaphragm suspicious for retrocardiac opacity due to atelectasis/airspace  disease and possible pleural effusion as described. Cardiomegaly. EKG Results for orders placed or performed in visit on 06/13/19   AMB POC EKG ROUTINE W/ 12 LEADS, INTER & REP     Status: None    Narrative    Read by Lynn Niño MD - Sinus bradycardia. RBBB and right axis - possible right ventricular hypertrophy. Consider pulmonary disease. T-abnormality. Possible anterolateral and inferior ischemia.         2D ECHO

## 2020-01-08 NOTE — PROGRESS NOTES
Alert, no apparent distress, incontinent care given, PVR: 366 ml. Pham inserted per MD order under sterile technique. Patient tolerated procedure well.

## 2020-01-08 NOTE — PROGRESS NOTES
0244  Awake, no apparent distress, elevated BP. Cornelius GALARZA.   Visit Vitals  /81   Pulse 86   Temp 98 °F (36.7 °C)   Resp 18   Ht 4' 11\" (1.499 m)   Wt 59.9 kg (132 lb)   SpO2 100%   Breastfeeding No   BMI 26.66 kg/m²     New order received:   Give clonidine 0.1mg now  Check BP p 2 hours then  If sys BP >180, may give another clonidine 0.1mg po  Per Dr. Airam Evans  Awake, no apparent distress, BP decreased. Bed alarm ON, and bed in low position. Family at the bed side.     Visit Vitals  /73 (BP 1 Location: Right arm, BP Patient Position: At rest)   Pulse 85   Temp 97.8 °F (36.6 °C)   Resp 18   Ht 4' 11\" (1.499 m)   Wt 59.9 kg (132 lb)   SpO2 97%   Breastfeeding No   BMI 26.66 kg/m²

## 2020-01-09 NOTE — PROGRESS NOTES
D/c noted for today, patient will return home with son and caregiver patient will be transported by son and caregiver. Hospital bed orders sent to [facility], to be discussed with son and delivered to home post-discharge.        MARTÍN Trammell  Case Management  [phone]

## 2020-01-09 NOTE — PROGRESS NOTES
[facility] [facility]   Patient has been evaluated to have a hospital bed for home. Patient has a chronic medical condition(s) of: ______________________________________________________________________ Patient's medical condition requires positioning of the body in ways not feasible with an ordinary bed due to (please select a condition)   ___ Requires positioning of the body in ways not feasible with an ordinary bed to alleviate pain. ___ Requires the head of the bed to be elevated more than 30 degrees most of the time due to congestive heart failure, chronic pulmonary disease, or problems with aspiration. ___ Requires traction equipment, which can only be attached to a hospital bed. AND   __X_ Patient has need for frequent changes of body position and/or an immediate need for a change in body position.    Physician Signature________________________________ NPI________________   Name Printed _________________________________ Date___________________

## 2020-01-09 NOTE — PROGRESS NOTES
Patient is alert, no apparent distress, BP elevated. Cornelius GALARZA.   Visit Vitals  /63 (BP 1 Location: Left arm, BP Patient Position: At rest)   Pulse 60   Temp 97.6 °F (36.4 °C)   Resp 16   Ht 4' 11\" (1.499 m)   Wt 60.5 kg (133 lb 4.8 oz)   SpO2 100%   Breastfeeding No   BMI 26.92 kg/m²     0715  MD called back  New order received: Give Am dose of clonidine now. See MAR. Per hospitalist Dr. Sommer Soria.

## 2020-01-09 NOTE — PROGRESS NOTES
Bedside and Verbal shift change report given to  Keyon Joseph RN (oncoming nurse) by Taylor Page RN (offgoing nurse). Report included the following information SBAR and Kardex.

## 2020-01-09 NOTE — DISCHARGE SUMMARY
Discharge Summary    Patient: Sharon Falcon               Sex: female          DOA: 1/5/2020         YOB: 1922      Age:  80 y.o.        LOS:  LOS: 4 days                Admit Date: 1/5/2020    Discharge Date: 1/9/2020    Primary care physician: Alfonso Mauricio MD    Discharge Diagnoses:    1)  Acute metabolic encephalopathy, multifactorial, resolved       Negative acute finding on CT  2)  Alzheimer's dementia without behavior problem   3)  E. Coli UTI with microhematuria, finished antibiotic   4)  Hypertensive urgency, resolved   5)  Chronic elevated troponin, indeterminate, with CAD, stable   6)  mild acute on chronic diastolic CHF (elevated proBNP)  7)  Atypical chest pain with cough, resolving   8)  CKD3, creatinine improved. 9)  Acute urinary retention, on muller. Discharge Condition: Good  Disposition:  Home with home health  Code Status: full code     Follow up for Primary Care Physician:  1) she has been discontinue from Valium, and Namenda as concern for side effect, please monitor  2) she was started on haldol prn for agitation in the setting of alzheimer's disease  3) consider enroll her into hospice  4) her lasix dose has been decreased to due concern for renal perfusion   5) she can be maintained at above goal . Hospital Course:   80 y.o. female with medical co-morbidities including HTN, chronic diastolic CHF, CAD, CKD III, h/o CVA, Advanced age with Alzheimer's dementia, presented from home due to worsened agitation per caretaker. According to her caretaker, she has been more agitated over the last few days, no associated fever. She has dyspnea with exertion but no leg swelling. She baseline agitation that she characterized as chest pain. Recently, pt was seen by PCP for the dyspnea but chest xray was unrevealing. She also had increased lasix to 60mg daily but her caretaker that it was too much due to her frequent urination.   At home she has been using prn dose of valium. As for her Alzheimer's, she only on Namenda, her Aricept has been stopped due to cost.      In the ER, initial concern for chest pain but that resolved. She has elevated troponin but this is chronic. It is lower than her baseline. Her proBNP elevated but less than last check at 12/2/2019. Chest xray without infiltrate. Her urinalysis with positive for infection, hence she started on Ceftriaxone. Her BP was elevated, she was started on lasix and clonidine. NOTE: her carvedilol has been stopped due to significant bradycardia. She tolerated ceftriaxone and urine grew E.coli. she finished ceftriaxone course. She has urinary retention required muller, likely due to namenda and acute illness. namenda stopped. She has not been taking aricept. Her encephalopathy initially improved with seroquel, but worsened. Thought to be due to valium withdrawal but she was given ativan without help. All her new medications were stopped beside antibiotic. She recovered well, she tolerated haldol prn. CT head was negative for new pathology  She has heart failure with chronic elevated troponin, cardiology consult but no further recommendation. She tolerated lasix but her creatinine bumped, hence lasix was titrated down with renal function recovered. Her BP was keep elevated, she resumed her home medication. Last 24 Hours:  No more agitation, no confusion. More participating with family at bedside. She had urinary retention required muller but resolved with good voiding trial.   Renal function recovered with stopping of IV lasix in the last two day. She finished 5 doses of ceftriaxone   No further recommendation from cardiology. Hospice discussion was brought up but family declined at this time.      ROS: limited but she answered no chills, no headache, no dizziness, no sinus congestion,   No swallowing pain, No chest pain, no palpitation, no shortness of breath, no abd pain,  No diarrhea, no urinary complaint, no leg pain or swelling    VS:   Visit Vitals  /67   Pulse 87   Temp 98.2 °F (36.8 °C)   Resp 16   Ht 4' 11\" (1.499 m)   Wt 60.5 kg (133 lb 4.8 oz)   SpO2 99%   Breastfeeding No   BMI 26.92 kg/m²      Tmax/24hrs: Temp (24hrs), Av.5 °F (36.4 °C), Min:96.3 °F (35.7 °C), Max:98.2 °F (36.8 °C)      Intake/Output Summary (Last 24 hours) at 2020 1050  Last data filed at 2020 0559  Gross per 24 hour   Intake 240 ml   Output 450 ml   Net -210 ml     General:  Cooperative, Not in acute distress, speaks in short sentence while in bed  HEENT: PERRL, EOMI, supple neck, no JVD, dry oral mucosa  Cardiovascular: S1S2 regular, no rub/gallop   Pulmonary: Clear air entry bilaterally, no wheezing, no crackle  GI:  Soft, non tender, non distended, +bs, no guarding   Extremities:  No pedal edema, +distal pulses appreciated   Neuro: AOx2, moving all extremities, no gross deficit. Consults:   Cardiology: Dr. Jarrett Paniagua    Significant Diagnostic Studies:   CT Results (most recent):  Results from Hospital Encounter encounter on 20   CT HEAD WO CONT    Narrative Head CT without contrast    HISTORY: Worsening agitation  COMPARISON: CT head 2/3/2011, MRI 2013    Technique: CT images of the head were obtained. All CT scans at this facility  are performed using dose optimization technique as appropriate to the performed  exam, to include automated exposure control, adjustment of the mA and/or kV  according to patient's size (Including appropriate matching for site-specific  examinations), or use of iterative reconstruction technique. FINDINGS:  Motion degraded exam. Left parietotemporal encephalomalacia changes similar to  prior. Similar generalized volume loss. Mild periventricular white matter  hypoattenuation. No intracranial hemorrhage, extra-axial fluid collection or mass effect. No  shift of midline structures. No evidence for acute ischemia. Intact osseous structures.   The visualized paranasal air sinuses are aerated. Impression IMPRESSION:    Motion degraded examination. 1. No definite evidence of acute intracranial abnormality. 2. Similar remote left parietotemporal encephalomalacia. 3. Mild volume loss and mild periventricular white matter chronic small vessel  disease. XR Results (most recent):  Results from Hospital Encounter encounter on 01/05/20   XR CHEST PORT    Narrative AP CHEST, PORTABLE    CPT CODE: 13316    INDICATION: Above. Chest pain and shortness of breath. COMPARISON: 12/2/2019. TECHNIQUE: Portable  AP chest radiograph is reviewed. FINDINGS:    Lung volumes are slightly lower compared to the prior study with some associated  relative accentuation of the lung markings. Pulmonary vascular congestion, with  mild hazy increased opacity which may reflect mild predominantly interstitial  pulmonary edema. The left hemidiaphragm/costophrenic sulcus is poorly visualized  suspicious for retrocardiac opacity due to atelectasis/airspace disease and/or  possible pleural effusion, assessment limited by cardiomegaly and  underpenetration. Follow-up PA and lateral chest radiograph might be helpful  when clinically feasible as clinically warranted. No evidence of pneumothorax. The right costophrenic sulcus appears sharp. The cardiac silhouette remains enlarged. Aortic atherosclerotic calcification  noted best seen at and distal to the level of the aortic arch. Metallic clasp-like and zipper-like densities overlie the patient likely related  to clothing. Impression IMPRESSION:     Pulmonary vascular congestion with possible mild predominantly interstitial  pulmonary edema as described. Assessment of the left lung base is limited by  cardiomegaly and underpenetration, with poor visualization of the left  hemidiaphragm suspicious for retrocardiac opacity due to atelectasis/airspace  disease and possible pleural effusion as described. Cardiomegaly.           Asuncion Spurling Review:  Labs: Results:       Chemistry Recent Labs     01/08/20  1018 01/07/20  0920   * 101*    142   K 4.0 4.0    103   CO2 29 27   BUN 34* 30*   CREA 2.22* 2.61*   CA 9.5 8.8   AGAP 10 12   BUCR 15 11*   AP  --  90   TP  --  7.4   ALB  --  2.8*   GLOB  --  4.6*   AGRAT  --  0.6*      CBC w/Diff Recent Labs     01/07/20  0920   WBC 11.5   RBC 4.08*   HGB 11.1*   HCT 35.1         Coagulation No results for input(s): PTP, INR, APTT, INREXT in the last 72 hours. Iron/Ferritin No results for input(s): IRON in the last 72 hours. No lab exists for component: TIBCCALC   BNP No results for input(s): BNPP in the last 72 hours. Cardiac Enzymes No results for input(s): CPK, CKND1, FELTON in the last 72 hours. No lab exists for component: CKRMB, TROIP   Liver Enzymes Recent Labs     01/07/20  0920   TP 7.4   ALB 2.8*   AP 90   SGOT 31      Thyroid Studies No results for input(s): T4, T3U, TSH, TSHEXT in the last 72 hours. No lab exists for component: T3RU       All Micro Results     Procedure Component Value Units Date/Time    CULTURE, URINE [[id number]] Collected:  01/08/20 0500    Order Status:  Completed Specimen:  Urine from Pham Specimen Updated:  01/08/20 0514    CULTURE, URINE [[id number]]  (Abnormal)  (Susceptibility) Collected:  01/05/20 1325    Order Status:  Completed Specimen:  Cath Urine Updated:  01/07/20 0829     Special Requests: NO SPECIAL REQUESTS        Culture result:       >100,000 COLONIES/mL ESCHERICHIA COLI                      Medications at discharge  including reasons for change and indications for new ones:   Current Discharge Medication List      START taking these medications    Details   lactobacillus sp. 50 billion cpu (BIO-K PLUS) 50 billion cell -375 mg cap capsule Take 1 Cap by mouth daily. Qty: 5 Cap, Refills: 0      guaiFENesin ER (MUCINEX) 600 mg ER tablet Take 1 Tab by mouth every twelve (12) hours.  Indications: cough  Qty: 30 Tab, Refills: 1 haloperidol (HALDOL) 2 mg tablet Take 1 Tab by mouth nightly as needed (delirium, agitation). Indications: delirium  Qty: 30 Tab, Refills: 1         CONTINUE these medications which have CHANGED    Details   furosemide (LASIX) 20 mg tablet Take 1 Tab by mouth daily. Indications: high blood pressure  Qty: 30 Tab, Refills: 2    Associated Diagnoses: Medication refill         CONTINUE these medications which have NOT CHANGED    Details   ergocalciferol (ERGOCALCIFEROL) 50,000 unit capsule TAKE 1 CAPSULE WEEKLY  Refills: 5      vitamin e (E GEMS) 1,000 unit capsule Take 1,000 Units by mouth daily. ferrous sulfate (SLOW FE) 142 mg (45 mg iron) ER tablet Take  by mouth Daily (before breakfast). cloNIDine HCl (CATAPRES) 0.2 mg tablet Take 1 Tab by mouth two (2) times a day. Qty: 60 Tab, Refills: 0      folic acid/multivit-min/lutein (CENTRUM SILVER PO) Take 1 Tab by mouth daily. allopurinol (ZYLOPRIM) 100 mg tablet Take 100 mg by mouth every Monday and Friday. atorvastatin (LIPITOR) 20 mg tablet Take 20 mg by mouth daily. loratadine (CLARITIN) 10 mg tablet Take 10 mg by mouth daily as needed for Allergies. clopidogrel (PLAVIX) 75 mg tab Take 75 mg by mouth daily. felodipine (PLENDIL SR) 10 mg 24 hr tablet Take 1 Tab by mouth daily. Qty: 180 Tab, Refills: 5      aspirin delayed-release 81 mg tablet Take 81 mg by mouth daily.          STOP taking these medications       diazePAM (VALIUM) 5 mg tablet Comments:   Reason for Stopping:         memantine ER (NAMENDA XR) 21 mg capsule Comments:   Reason for Stopping:                       Pending laboratory work and tests: none    Activity: Activity as tolerated, fall precaution    Diet: Cardiac Diet and Low fat, Low cholesterol    Wound Care: None needed        Mireille Gonzalez MD  1/9/2020  10:50 AM

## 2020-01-09 NOTE — PROGRESS NOTES
Bedside and Verbal shift change report given to Taylor Page RN (oncoming nurse) by Domenico Smith RN (offgoing nurse). Report included the following information SBAR and Kardex.

## 2020-01-10 NOTE — PROGRESS NOTES
notified and made aware of elevated BP . Also informed him that son wanted to switch discharge med Haldol to oral solution form from tablet. New prescription for Haldol oral solution written. Also received order to give bedtime dose of Norvasc now along with scheduled clonidine for the evening prior to discharging pt home.

## 2020-01-10 NOTE — DISCHARGE INSTRUCTIONS
Discharge Instructions    Patient: Elisabeth Sherman MRN: [id number]  CSN: [id number]    YOB: 1922  Age: 80 y.o. Sex: female    DOA: 1/5/2020 LOS:  LOS: 4 days   Discharge Date:      ACUTE DIAGNOSES:     1)  Acute metabolic encephalopathy, multifactorial, resolved       Negative acute finding on CT  2)  Alzheimer's dementia without behavior problem   3)  E. Coli UTI with microhematuria, finished antibiotic   4)  Hypertensive urgency, resolved   5)  Chronic elevated troponin, indeterminate, with CAD, stable   6)  mild acute on chronic diastolic CHF (elevated proBNP)  7)  Atypical chest pain with cough, resolving   8)  CKD3, creatinine improved. 9)  Acute urinary retention, on muller. DISCHARGE MEDICATIONS:   {Medication reconciliation information is now added to the patient's AVS automatically when it is printed. There is no need to use this SmartLink in discharge instructions. Highlight this text and delete it to clear this message}      · It is important that you take the medication exactly as they are prescribed. · Keep your medication in the bottles provided by the pharmacist and keep a list of the medication names, dosages, and times to be taken in your wallet. · Do not take other medications without consulting your doctor. DIET:  Cardiac Diet and Low fat, Low cholesterol    ACTIVITY: Activity as tolerated, fall precaution     ADDITIONAL INFORMATION: If you experience any of the following symptoms then please call your primary care physician or return to the emergency room if you cannot get hold of your doctor: Fever, chills, nausea, vomiting, diarrhea, change in mentation, falling, bleeding, shortness of breath. FOLLOW UP CARE:  Dr. Maryse Doty MD  you are to call and set up an appointment to see them in 7-10 days.     Follow-up with Dr Mimi Angulo, cardiology in 2-4 weeks as needed       Information obtained by :  I understand that if any problems occur once I am at home I am to contact my physician. I understand and acknowledge receipt of the instructions indicated above. Physician's or R.N.'s Signature                                                                  Date/Time                                                                                                                                              Patient or Representative Signature                                                          Date/Time    Lamar Paz MD  1/9/2020  10:48 AM      DISCHARGE SUMMARY from Nurse    *  Please give a list of your current medications to your Primary Care Provider. *  Please update this list whenever your medications are discontinued, doses are      changed, or new medications (including over-the-counter products) are added. *  Please carry medication information at all times in case of emergency situations. These are general instructions for a healthy lifestyle:    No smoking/ No tobacco products/ Avoid exposure to second hand smoke  Surgeon General's Warning:  Quitting smoking now greatly reduces serious risk to your health. Obesity, smoking, and sedentary lifestyle greatly increases your risk for illness    A healthy diet, regular physical exercise & weight monitoring are important for maintaining a healthy lifestyle    You may be retaining fluid if you have a history of heart failure or if you experience any of the following symptoms:  Weight gain of 3 pounds or more overnight or 5 pounds in a week, increased swelling in our hands or feet or shortness of breath while lying flat in bed. Please call your doctor as soon as you notice any of these symptoms; do not wait until your next office visit. The discharge information has been reviewed with the patient and caregiver/guardian.   The patient and caregiver/guardian verbalized understanding. Discharge medications reviewed with the patient, guardian/caregiver and appropriate educational materials and side effects teaching were provided.       ___________________________________________________________________________________________________________________________________

## 2020-02-29 PROBLEM — R77.8 ELEVATED TROPONIN: Status: ACTIVE | Noted: 2020-01-01

## 2020-02-29 PROBLEM — N18.9 CHRONIC KIDNEY DISEASE: Status: ACTIVE | Noted: 2020-01-01

## 2020-02-29 PROBLEM — E87.5 HYPERKALEMIA: Status: ACTIVE | Noted: 2020-01-01

## 2020-02-29 PROBLEM — S72.001A CLOSED RIGHT HIP FRACTURE, INITIAL ENCOUNTER (HCC): Status: ACTIVE | Noted: 2020-01-01

## 2020-02-29 NOTE — ROUTINE PROCESS
TRANSFER - IN REPORT: 
 
Verbal report received from PATT Forrester(name) on [address] [facility]  being received from Emergency Room(unit) for routine progression of care Report consisted of patients Situation, Background, Assessment and  
Recommendations(SBAR). Information from the following report(s) SBAR, Kardex, Intake/Output, MAR, Recent Results and Med Rec Status was reviewed with the receiving nurse. Opportunity for questions and clarification was provided. Assessment completed upon patients arrival to unit and care assumed.

## 2020-02-29 NOTE — ED PROVIDER NOTES
EMERGENCY DEPARTMENT HISTORY AND PHYSICAL EXAM 
 
11:15 AM 
 
 
Date: 2/29/2020 Patient Name: Gonsalo Villa History of Presenting Illness Chief Complaint Patient presents with  Fall History Provided By: Patient and Patient's Daughter Additional History (Context): Gonsalo Villa is a 80 y.o. female with Past medical history of dementia, anxiety, anemia, hypertension who presents with chief complaint per daughter of fall. According to daughter, she found patient on the floor wedged by her bed when she arrived this morning at about 9:30 AM.  Patient is complaining of right hip pain. Patient denies any neck pain or headache. HPI otherwise limited due to patient dementia. Daughter states that she put patient in the bed at 8:30 PM and patient lives alone with the grandson. She states that her grandson heard her get up to go to the bathroom at 3:30 AM.  Daughter states that she believes she may have fallen around 6:30 AM as grandson may have heard a sound at that time. Daughter reports that patient has not been ill recently and states that she has not had any cough, vomiting, or any complaints of pain. PCP: Vinicio Rosario MD 
 
 
 
Past History Past Medical History: 
Past Medical History:  
Diagnosis Date  Anemia NEC  Anxiety  Cataract   
 bilat  Colon cancer ([facility] [address] 1999  CRI (chronic renal insufficiency) 4/07  CVA (cerebral infarction) 4/01  
 right facial droop  CVA (cerebral infarction) 2/11  
 left post corona radiata  Dementia ([facility] [address]  Diverticulosis  Edema  GERD (gastroesophageal reflux disease)  Hypercholesterolemia  Hypertension  Hypomagnesemia 2/11  Lipoma of neck   
 right  Orthostatic hypotension  RBBB (right bundle branch block)  Syncope 6/08  TIA (transient ischemic attack) mid 1990's Past Surgical History: 
Past Surgical History:  
Procedure Laterality Date  ENDOSCOPY, COLON, DIAGNOSTIC  2006  HX BREAST BIOPSY  HX CATARACT REMOVAL  10/02  
 left  HX CATARACT REMOVAL  6/03  
 right  HX COLECTOMY  1999  
 right hemicolectomy  HX HYSTERECTOMY  HX LIPOMA RESECTION Family History: No family history on file. Social History: 
Social History Tobacco Use  Smoking status: Never Smoker  Smokeless tobacco: Never Used Substance Use Topics  Alcohol use: No  
 Drug use: Not on file Allergies: Allergies Allergen Reactions  Shellfish Derived Hives and Nausea and Vomiting Review of Systems Review of Systems Unable to perform ROS: Dementia Musculoskeletal: Positive for arthralgias (Right hip pain). Negative for neck pain. Neurological: Negative for headaches. Physical Exam  
 
Visit Vitals /73 (BP 1 Location: Left arm, BP Patient Position: At rest;Sitting) Pulse 80 Temp 98.9 °F (37.2 °C) Resp 16 Wt 59.6 kg (131 lb 8 oz) SpO2 96% BMI 26.56 kg/m² Physical Exam 
Vitals signs and nursing note reviewed. Constitutional:   
   Appearance: She is well-developed. She is not toxic-appearing or diaphoretic. HENT:  
   Head: Normocephalic and atraumatic. Nose: Nose normal.  
   Mouth/Throat:  
   Mouth: Mucous membranes are moist.  
Eyes:  
   General: No scleral icterus. Conjunctiva/sclera: Conjunctivae normal.  
   Pupils: Pupils are equal, round, and reactive to light. Neck: Musculoskeletal: Normal range of motion and neck supple. Cardiovascular:  
   Rate and Rhythm: Normal rate. Heart sounds: Normal heart sounds. Comments: Capillary refill < 3 seconds Pulmonary:  
   Effort: Pulmonary effort is normal. No respiratory distress. Breath sounds: Normal breath sounds. No wheezing. Abdominal:  
   General: Bowel sounds are normal. There is no distension. Palpations: Abdomen is soft. Tenderness: There is no abdominal tenderness. Musculoskeletal: Normal range of motion. General: Tenderness, deformity and signs of injury present. Right lower leg: No edema. Left lower leg: No edema. Comments: Right leg is shortened and externally rotated. Exquisite tenderness when I palpate the left hip. Has full range of motion head neck no midline cervical tenderness. No shoulder or arm tenderness, has good range of motion of her arms. Has good range of motion left lower extremity with good knee flexion and no pain with that. Lymphadenopathy:  
   Cervical: No cervical adenopathy. Skin: 
   General: Skin is warm and dry. Coloration: Skin is not jaundiced or pale. Findings: No rash. Neurological:  
   Mental Status: She is alert and oriented to person, place, and time. Cranial Nerves: No cranial nerve deficit. Sensory: No sensory deficit. Deep Tendon Reflexes: Reflexes normal.  
   Comments: No slurred speech No facial droop Psychiatric:  
   Comments: Pleasantly confused with history of dementia, baseline per daughter Diagnostic Study Results Labs - Recent Results (from the past 12 hour(s)) URINALYSIS W/ RFLX MICROSCOPIC Collection Time: 02/29/20 11:30 AM  
Result Value Ref Range Color YELLOW Appearance CLEAR Specific gravity 1.018 1.005 - 1.030    
 pH (UA) 5.0 5.0 - 8.0 Protein 100 (A) NEG mg/dL Glucose NEGATIVE  NEG mg/dL Ketone NEGATIVE  NEG mg/dL Bilirubin NEGATIVE  NEG Blood NEGATIVE  NEG Urobilinogen 0.2 0.2 - 1.0 EU/dL Nitrites NEGATIVE  NEG Leukocyte Esterase NEGATIVE  NEG    
URINE MICROSCOPIC ONLY Collection Time: 02/29/20 11:30 AM  
Result Value Ref Range WBC 0 to 3 0 - 4 /hpf  
 RBC NONE 0 - 5 /hpf Epithelial cells 2+ 0 - 5 /lpf Bacteria 1+ (A) NEG /hpf Mucus 1+ (A) NEG /lpf Yeast FEW (A) NEG    
CBC WITH AUTOMATED DIFF Collection Time: 02/29/20 11:58 AM  
Result Value Ref Range WBC 14.1 (H) 4.6 - 13.2 K/uL  
 RBC 4.01 (L) 4.20 - 5.30 M/uL  
 HGB 10.9 (L) 12.0 - 16.0 g/dL HCT 34.6 (L) 35.0 - 45.0 % MCV 86.3 74.0 - 97.0 FL  
 MCH 27.2 24.0 - 34.0 PG  
 MCHC 31.5 31.0 - 37.0 g/dL  
 RDW 15.4 (H) 11.6 - 14.5 % PLATELET 292 496 - 248 K/uL MPV 10.3 9.2 - 11.8 FL  
 NEUTROPHILS 89 (H) 40 - 73 % LYMPHOCYTES 6 (L) 21 - 52 % MONOCYTES 5 3 - 10 % EOSINOPHILS 0 0 - 5 % BASOPHILS 0 0 - 2 %  
 ABS. NEUTROPHILS 12.7 (H) 1.8 - 8.0 K/UL  
 ABS. LYMPHOCYTES 0.8 (L) 0.9 - 3.6 K/UL  
 ABS. MONOCYTES 0.6 0.05 - 1.2 K/UL  
 ABS. EOSINOPHILS 0.0 0.0 - 0.4 K/UL  
 ABS. BASOPHILS 0.0 0.0 - 0.1 K/UL  
 DF AUTOMATED METABOLIC PANEL, BASIC Collection Time: 02/29/20 11:58 AM  
Result Value Ref Range Sodium 141 136 - 145 mmol/L Potassium 5.7 (H) 3.5 - 5.5 mmol/L Chloride 115 (H) 100 - 111 mmol/L  
 CO2 23 21 - 32 mmol/L Anion gap 3 3.0 - 18 mmol/L Glucose 103 (H) 74 - 99 mg/dL BUN 28 (H) 7.0 - 18 MG/DL Creatinine 1.68 (H) 0.6 - 1.3 MG/DL  
 BUN/Creatinine ratio 17 12 - 20 GFR est AA 34 (L) >60 ml/min/1.73m2 GFR est non-AA 28 (L) >60 ml/min/1.73m2 Calcium 9.2 8.5 - 10.1 MG/DL  
CARDIAC PANEL,(CK, CKMB & TROPONIN) Collection Time: 02/29/20 11:58 AM  
Result Value Ref Range  26 - 192 U/L  
 CK - MB 4.8 (H) <3.6 ng/ml CK-MB Index 3.9 0.0 - 4.0 % Troponin-I, QT 0.48 (H) 0.0 - 0.045 NG/ML  
PTT Collection Time: 02/29/20 11:58 AM  
Result Value Ref Range aPTT 29.1 23.0 - 36.4 SEC PROTHROMBIN TIME + INR Collection Time: 02/29/20 11:58 AM  
Result Value Ref Range Prothrombin time 13.8 11.5 - 15.2 sec INR 1.1 0.8 - 1.2 Radiologic Studies -  
XR HIP RT W OR WO PELV 2-3 VWS Final Result Impression:   
  
Complete acute fracture through the right femoral neck as described. XR CHEST SNGL V Final Result IMPRESSION:   
  
Ill-defined left lower lung opacity compatible with airspace disease versus atelectasis as described. Cardiomegaly. Medical Decision Making I am the first provider for this patient. I reviewed the vital signs, available nursing notes, past medical history, past surgical history, family history and social history. Vital Signs-Reviewed the patient's vital signs. Cardiac Monitor: 
Rate: 80 Rhythm:  Normal Sinus Rhythm EKG: Interpreted by the EP Dr. Berkley Silver. Time Interpreted: 12:05 PM 
 Rate: 84 Rhythm: Normal Sinus Rhythm Interpretation: Normal QTC, right bundle branch block, no ST elevations, no ST depression Records Reviewed: Nursing Notes and Old Medical Records (Time of Review: 11:15 AM) Provider Notes (Medical Decision Making): DDX: Hip fracture, hip contusion, cardiac, rhabdomyolysis, metabolic, dehydration Neuro is grossly intact, We will check labs, x-ray hip MDM Medications  
0.9% sodium chloride infusion (has no administration in time range) morphine injection 2 mg (has no administration in time range) morphine injection 4 mg (4 mg IntraVENous Given 2/29/20 1202) ondansetron [facility]) injection 4 mg (4 mg IntraVENous Given 2/29/20 1202) Kayexalate, given ED Course: Progress Notes, Reevaluation, and Consults: WBC 14 Potassium 5.7, gave dose of Kayexalate Chloride 115 BUN 28 Creatinine 1.7 Troponin 0.45 CK within normal limits Consult:  Discussed care with Dr. Erik Gunderson, Specialty: Orthopedic surgeon, standard discussion; including history of patients chief complaint, available diagnostic results, and treatment course. Accepts consult, [facility] have patient n.p.o. after midnight, admit to hospitalist for clearance 1:05 PM, 2/29/2020 Consult:  Discussed care with Dr. Lewis Alvarez, Specialty: Hospitalist, standard discussion; including history of patients chief complaint, available diagnostic results, and treatment course. Accepts admission 1:11 PM, 2/29/2020 I discussed diagnosis, results, plan with patient and daughter. Daughter in agreement. Daughter states that she has notified her brother who is power of . Diagnosis Clinical Impression: 1. Closed right hip fracture, initial encounter ([facility] [address] 2. Acute hyperkalemia 3. Elevated troponin 4. Chronic kidney disease, unspecified CKD stage Disposition: Admitted Follow-up Information None DO Yesenia George medical dictation software was used for portions of this report. Unintended transcription errors may occur. My signature above authenticates this document and my orders, the final   
diagnosis (es), discharge prescription (s), and instructions in the Epic   
record.

## 2020-02-29 NOTE — H&P
Admission History and Physical 
 
Nguyen Rom is a 80 y.o. female who is being admitted. Chief Complaint Patient presents with  Fall Impression/Plan  
 
80years old presented after a fall at home 
 
-Right hip fracture Pain control 
N.p.o. from midnight Management per orthopedics No further preoperative cardiac work-up if cardiac enzyme trend is flat 
 
-Hypertension Continue home medications and monitor blood pressure 
 
-Dementia Supportive care PRN Haldol for agitation 
 
-CAD, per records 
-History of CVA 
-Chronic diastolic CHF Holding antiplatelets Holding Lasix as she is n.p.o. 
Monitor for signs of fluid overload while on IV fluids 
 
-Other medical problems as below Continue home medications and outpatient follow-up Heparin for DVT prophylaxis for now and then per orthopedics HPI:   
80years old with medical history significant for dementia, history of CVA, stage III CKD, hypertension, CAD per records and other medical problems as below who was brought from home after she apparently had a fall and was found on the floor. She complained of right hip pain and was noted to have a fracture. Orthopedics was consulted by the ED and considering operative fixation. At baseline, she ambulates with a walker. Her troponin was noted at 0.48 which is likely due to her CKD. Patient denies having chest pain and did not appear short of breath. History was limited due to her dementia. She has a caregiver at home who is with her at bedside. Her son is her POA. She had echo in June/19 which showed EF of 55%. Past Medical History:  
Diagnosis Date  Anemia NEC  Anxiety  Cataract   
 bilat  Colon cancer ([facility] [address] 1999  CRI (chronic renal insufficiency) 4/07  CVA (cerebral infarction) 4/01  
 right facial droop  CVA (cerebral infarction) 2/11  
 left post corona radiata  Dementia ([address]  Diverticulosis  Edema  GERD (gastroesophageal reflux disease)  Hypercholesterolemia  Hypertension  Hypomagnesemia 2/11  Lipoma of neck   
 right  Orthostatic hypotension  RBBB (right bundle branch block)  Syncope 6/08  TIA (transient ischemic attack) mid 1990's Past Surgical History:  
Procedure Laterality Date  ENDOSCOPY, COLON, DIAGNOSTIC  2006  HX BREAST BIOPSY  HX CATARACT REMOVAL  10/02  
 left  HX CATARACT REMOVAL  6/03  
 right  HX COLECTOMY  1999  
 right hemicolectomy  HX HYSTERECTOMY  HX LIPOMA RESECTION Social history, lives with family and has a caregiver Family history, high blood pressure Allergies Allergen Reactions  Shellfish Derived Hives and Nausea and Vomiting Home Medications: No current facility-administered medications on file prior to encounter. Current Outpatient Medications on File Prior to Encounter Medication Sig Dispense Refill  furosemide (LASIX) 20 mg tablet Take 1 Tab by mouth daily. Indications: high blood pressure 30 Tab 2  
 lactobacillus sp. 50 billion cpu (BIO-K PLUS) 50 billion cell -375 mg cap capsule Take 1 Cap by mouth daily. 5 Cap 0  
 guaiFENesin ER (MUCINEX) 600 mg ER tablet Take 1 Tab by mouth every twelve (12) hours. Indications: cough 30 Tab 1  
 ergocalciferol (ERGOCALCIFEROL) 50,000 unit capsule TAKE 1 CAPSULE WEEKLY  5  
 vitamin e (E GEMS) 1,000 unit capsule Take 1,000 Units by mouth daily.  ferrous sulfate (SLOW FE) 142 mg (45 mg iron) ER tablet Take  by mouth Daily (before breakfast).  cloNIDine HCl (CATAPRES) 0.2 mg tablet Take 1 Tab by mouth two (2) times a day. (Patient taking differently: Take 0.2 mg by mouth two (2) times a day. Indications: 1/2 a tablet in addition to full tablet qod) 60 Tab 0  
 folic acid/multivit-min/lutein (CENTRUM SILVER PO) Take 1 Tab by mouth daily.  allopurinol (ZYLOPRIM) 100 mg tablet Take 100 mg by mouth every Monday and Friday.  atorvastatin (LIPITOR) 20 mg tablet Take 20 mg by mouth daily.  loratadine (CLARITIN) 10 mg tablet Take 10 mg by mouth daily as needed for Allergies.  clopidogrel (PLAVIX) 75 mg tab Take 75 mg by mouth daily.  felodipine (PLENDIL SR) 10 mg 24 hr tablet Take 1 Tab by mouth daily. 180 Tab 5  
 aspirin delayed-release 81 mg tablet Take 81 mg by mouth daily.  haloperidol (HALDOL) 2 mg/mL oral concentrate Take 1 mL by mouth every six (6) hours as needed (agitation). 30 mL 1 Review of Systems:  
Unable to obtain reliable ROS, since patient has dementia Physical Assessment:  
 
Visit Vitals /73 (BP 1 Location: Left arm, BP Patient Position: At rest;Sitting) Pulse 80 Temp 98.9 °F (37.2 °C) Resp 16 Wt 59.6 kg (131 lb 8 oz) SpO2 96% BMI 26.56 kg/m² Constitutional: The patient is alert, no distress. Eyes: No scleral icterus. Neck: No JVD present. Cardiovascular: Exam reveals no gallop and no friction rub. Pulmonary/Chest: No stridor. No respiratory distress. has no wheezes. has no rales. Abdominal: Soft. Bowel sounds are normal. exhibits no distension. No tenderness. No rebound and no guarding. Musculoskeletal: No joint swelling, lower extremity strength exam deferred because of the fracture Neurological: No facial asymmetry  
skin: Skin is warm and dry. Psychiatric: Calm, no agitation. Recent Results (from the past 24 hour(s)) URINALYSIS W/ RFLX MICROSCOPIC Collection Time: 02/29/20 11:30 AM  
Result Value Ref Range Color YELLOW Appearance CLEAR Specific gravity 1.018 1.005 - 1.030    
 pH (UA) 5.0 5.0 - 8.0 Protein 100 (A) NEG mg/dL Glucose NEGATIVE  NEG mg/dL Ketone NEGATIVE  NEG mg/dL Bilirubin NEGATIVE  NEG Blood NEGATIVE  NEG Urobilinogen 0.2 0.2 - 1.0 EU/dL Nitrites NEGATIVE  NEG Leukocyte Esterase NEGATIVE  NEG    
URINE MICROSCOPIC ONLY  Collection Time: 02/29/20 11:30 AM  
 Result Value Ref Range WBC 0 to 3 0 - 4 /hpf  
 RBC NONE 0 - 5 /hpf Epithelial cells 2+ 0 - 5 /lpf Bacteria 1+ (A) NEG /hpf Mucus 1+ (A) NEG /lpf Yeast FEW (A) NEG    
CBC WITH AUTOMATED DIFF Collection Time: 02/29/20 11:58 AM  
Result Value Ref Range WBC 14.1 (H) 4.6 - 13.2 K/uL  
 RBC 4.01 (L) 4.20 - 5.30 M/uL  
 HGB 10.9 (L) 12.0 - 16.0 g/dL HCT 34.6 (L) 35.0 - 45.0 % MCV 86.3 74.0 - 97.0 FL  
 MCH 27.2 24.0 - 34.0 PG  
 MCHC 31.5 31.0 - 37.0 g/dL  
 RDW 15.4 (H) 11.6 - 14.5 % PLATELET 383 285 - 005 K/uL MPV 10.3 9.2 - 11.8 FL  
 NEUTROPHILS 89 (H) 40 - 73 % LYMPHOCYTES 6 (L) 21 - 52 % MONOCYTES 5 3 - 10 % EOSINOPHILS 0 0 - 5 % BASOPHILS 0 0 - 2 %  
 ABS. NEUTROPHILS 12.7 (H) 1.8 - 8.0 K/UL  
 ABS. LYMPHOCYTES 0.8 (L) 0.9 - 3.6 K/UL  
 ABS. MONOCYTES 0.6 0.05 - 1.2 K/UL  
 ABS. EOSINOPHILS 0.0 0.0 - 0.4 K/UL  
 ABS. BASOPHILS 0.0 0.0 - 0.1 K/UL  
 DF AUTOMATED METABOLIC PANEL, BASIC Collection Time: 02/29/20 11:58 AM  
Result Value Ref Range Sodium 141 136 - 145 mmol/L Potassium 5.7 (H) 3.5 - 5.5 mmol/L Chloride 115 (H) 100 - 111 mmol/L  
 CO2 23 21 - 32 mmol/L Anion gap 3 3.0 - 18 mmol/L Glucose 103 (H) 74 - 99 mg/dL BUN 28 (H) 7.0 - 18 MG/DL Creatinine 1.68 (H) 0.6 - 1.3 MG/DL  
 BUN/Creatinine ratio 17 12 - 20 GFR est AA 34 (L) >60 ml/min/1.73m2 GFR est non-AA 28 (L) >60 ml/min/1.73m2 Calcium 9.2 8.5 - 10.1 MG/DL  
CARDIAC PANEL,(CK, CKMB & TROPONIN) Collection Time: 02/29/20 11:58 AM  
Result Value Ref Range  26 - 192 U/L  
 CK - MB 4.8 (H) <3.6 ng/ml CK-MB Index 3.9 0.0 - 4.0 % Troponin-I, QT 0.48 (H) 0.0 - 0.045 NG/ML  
PTT Collection Time: 02/29/20 11:58 AM  
Result Value Ref Range aPTT 29.1 23.0 - 36.4 SEC PROTHROMBIN TIME + INR Collection Time: 02/29/20 11:58 AM  
Result Value Ref Range Prothrombin time 13.8 11.5 - 15.2 sec INR 1.1 0.8 - 1.2 Diagnostic Studies: XR Hip Reported with right hip fracture

## 2020-02-29 NOTE — ED TRIAGE NOTES
Per EMS, Patient was found on the floor this morning around 0930 when her caregiver came into the house. She is c/o pain, but she has dementia so its hard to determine where she is actually hurting.

## 2020-02-29 NOTE — PROGRESS NOTES
Unable to complete patient's admission database upon arrival to the unit. Patient is confused at all times and has no family/guardian with her to assist in completing the database.

## 2020-02-29 NOTE — ED NOTES
Chart reviewed assumed care of pt at this time. Please note this pt arrived while this writer was in ct scan with another pt 
 
[id number]:  Patient educated on the medication(s) he/she is going to receive, allergies reviewed/verified and patient medicated as ordered. Side rails up and call light within reach. Will continue to monitor. 1315:  Hospitalist noted to be at bedside 1351:  Patient educated on the medication(s) he/she is going to receive, allergies reviewed/verified and patient medicated as ordered. Side rails up and call light within reach. Will continue to monitor. 1400:  Plan: admit. Pt awaits admission bed assignment 1600: This writer attempted to call report and the nurse will call this writer back

## 2020-03-01 NOTE — H&P
History and Physical 
 
Patient: Vamshi Sosa  MRN: [id number]  SSN: [id number] YOB: 1922  Age: 80 y.o. Sex: female Patient scheduled for: Right Hip Jayson-Arthroplasty. Date of surgery: 3/1/2020. Location of surgery: [facility]. Surgeon: Amanda Lopez. Past Medical History:  
Diagnosis Date  Anemia NEC  Anxiety  Cataract   
 bilat  Colon cancer ([facility] [address] 1999  CRI (chronic renal insufficiency) 4/07  CVA (cerebral infarction) 4/01  
 right facial droop  CVA (cerebral infarction) 2/11  
 left post corona radiata  Dementia ([facility] [address]  Diverticulosis  Edema  GERD (gastroesophageal reflux disease)  Hypercholesterolemia  Hypertension  Hypomagnesemia 2/11  Lipoma of neck   
 right  Orthostatic hypotension  RBBB (right bundle branch block)  Syncope 6/08  TIA (transient ischemic attack) mid 1990's Past Surgical History:  
Procedure Laterality Date  ENDOSCOPY, COLON, DIAGNOSTIC  2006  HX BREAST BIOPSY  HX CATARACT REMOVAL  10/02  
 left  HX CATARACT REMOVAL  6/03  
 right  HX COLECTOMY  1999  
 right hemicolectomy  HX HYSTERECTOMY  HX LIPOMA RESECTION Allergies Allergen Reactions  Shellfish Derived Hives and Nausea and Vomiting Current Facility-Administered Medications Medication Dose Route Frequency Provider Last Rate Last Dose  sodium chloride (NS) flush 5-40 mL  5-40 mL IntraVENous Q8H Fernie Garcia CRNA  sodium chloride (NS) flush 5-40 mL  5-40 mL IntraVENous Q8H Ajay Salinas DO      
 sodium chloride (NS) flush 5-40 mL  5-40 mL IntraVENous PRN Ajay Salinas DO      
 ceFAZolin (ANCEF) 2g IVPB in 50 mL D5W  2 g IntraVENous ONCE Ajay Salinas DO      
 0.9% sodium chloride infusion  50 mL/hr IntraVENous CONTINUOUS Rohit Coleman MD 50 mL/hr at 02/29/20 1805 50 mL/hr at 02/29/20 1805  felodipine (PLENDIL SR) 24 hr tablet 10 mg  10 mg Oral DAILY Virginia Rohit CALDWELL MD   10 mg at 03/01/20 0830  [START ON 3/2/2020] allopurinoL (ZYLOPRIM) tablet 100 mg  100 mg Oral EVERY MON & FRI Rohit Coleman MD      
 atorvastatin (LIPITOR) tablet 20 mg  20 mg Oral DAILY Rohit Coleman MD   20 mg at 03/01/20 8140  loratadine (CLARITIN) tablet 10 mg  10 mg Oral DAILY PRN Kell Duff MD      
 cloNIDine HCL (CATAPRES) tablet 0.2 mg  0.2 mg Oral BID Rohit Coleman MD   0.2 mg at 02/29/20 1821  [Held by provider] furosemide (LASIX) tablet 20 mg  20 mg Oral DAILY Rohit Coleman MD      
 haloperidol (HALDOL) 2 mg/mL oral solution 2 mg  2 mg Oral Q6H PRN Rohit Coleman MD      
 heparin (porcine) injection 5,000 Units  5,000 Units SubCUTAneous Q8H Rohit Coleman MD   Stopped at 02/29/20 2200  morphine injection 1 mg  1 mg IntraVENous Q3H PRN Rohit Vázquez MD   1 mg at 03/01/20 1112  acetaminophen (TYLENOL) suppository 650 mg  650 mg Rectal Q6H PRN Kell Duff MD      
 
 
 
Physical Examination Visit Vitals /61 (BP 1 Location: Right arm) Pulse 61 Temp 97 °F (36.1 °C) Resp 16 Ht 4' 11\" (1.499 m) Wt 131 lb 8 oz (59.6 kg) SpO2 99% Breastfeeding No  
BMI 26.56 kg/m² Gen: Well developed, well nourished 80 y.o. female in no acute distress Head: normocephalic, atraumatic Mouth: Clear, no overt lesions, oral mucosa pink and moist 
Neck: supple, no masses, no adenopathy or carotid bruits, trachea midline Resp: clear to auscultation bilaterally, no wheeze, rhonchi or rales, excursions normal and symmetrical 
Cardio: Regular rate and rhythm, no murmurs, clicks, gallops or rubs, no edema or varicosities Breasts: Examined in both the supine and upright positions. There was no supraclavicular, infraclavicular, or axillary lympadenopathy. There were no dominant masses, no skin changes, no asymmetry identified.  
Abdomen: soft, nontender, nondistended, normoactive bowel sounds, no hernias, no hepatosplenomegaly Extremeties: Right Hip tender to palpation with shortened externally rotated RLE. Sensation intact distally cap refill brisk. Neuro: sensation and strength grossly intact and symmetrical 
Psych: alert and oriented to person, place and time Assessment: 
Right Femoral Neck Fracture Plan: 
Right Hip Jayson-Arthroplasty No name on file.

## 2020-03-01 NOTE — PERIOP NOTES
Dr. Valorie Moody and dr Norm Tolentino pt's surgery emergency (no family available to receive telephone consent) Per DIONNE Gill CRNA he'll get  Pre-op Ancef in OR,

## 2020-03-01 NOTE — PROGRESS NOTES
[facility] Hospitalist Group Progress Note Patient: Caro Amaro Age: 80 y.o. : 1922 MR#: [id number] SSN: [id number] Date/Time: 3/1/2020 Subjective: I personally saw and evaluated this patient on 3/1/2020 in PACU. Patient lying in bed in NAD, awake, has dementia, family member \" real \" at bedside Assessment/Plan: - R Hip fracture 
-s/p Mechanical Fall 
- elevated trop - Chronic diastolic chf 
- pulmonary HTN 
- dementia 
- CKD 4 worsening 
-H/o HTN 
 
 
PLAN As per orthopedics Patient is at increased risk from surgical procedure- d/w family at bedside. Cardio eval, hold lasix 
nephro eval, gentle hydration Monitor labs 
dvt prophylaxis Case discussed with:  []Patient  []Family  []Nursing  []Case Management DVT Prophylaxis:  []Lovenox  []Hep SQ  []SCDs  []Coumadin   []On Heparin gtt Objective:  
VS:  
Visit Vitals /61 (BP 1 Location: Right arm) Pulse 61 Temp 97 °F (36.1 °C) Resp 18 Ht 4' 11\" (1.499 m) Wt 59.6 kg (131 lb 8 oz) SpO2 99% Breastfeeding No  
BMI 26.56 kg/m² Tmax/24hrs: Temp (24hrs), Av.2 °F (36.8 °C), Min:97 °F (36.1 °C), Max:98.9 °F (37.2 °C) Input/Output:  
 
Intake/Output Summary (Last 24 hours) at 3/1/2020 1444 Last data filed at 2020 9007 Gross per 24 hour Intake 360 ml Output 325 ml Net 35 ml General:  Awake, pleasantly demented Cardiovascular:  S1S2+, RRR Pulmonary:  CTA b/l GI:  Soft, BS+, NT, ND Extremities:  trace edema Labs:   
Recent Results (from the past 24 hour(s)) METABOLIC PANEL, COMPREHENSIVE Collection Time: 20  6:38 PM  
Result Value Ref Range Sodium 141 136 - 145 mmol/L Potassium 6.1 (HH) 3.5 - 5.5 mmol/L Chloride 115 (H) 100 - 111 mmol/L  
 CO2 21 21 - 32 mmol/L Anion gap 5 3.0 - 18 mmol/L Glucose 108 (H) 74 - 99 mg/dL BUN 29 (H) 7.0 - 18 MG/DL  Creatinine 1.84 (H) 0.6 - 1.3 MG/DL  
 BUN/Creatinine ratio 16 12 - 20    
 GFR est AA 31 (L) >60 ml/min/1.73m2 GFR est non-AA 25 (L) >60 ml/min/1.73m2 Calcium 8.8 8.5 - 10.1 MG/DL Bilirubin, total 0.5 0.2 - 1.0 MG/DL  
 ALT (SGPT) 28 13 - 56 U/L  
 AST (SGOT) 29 10 - 38 U/L Alk. phosphatase 86 45 - 117 U/L Protein, total 7.5 6.4 - 8.2 g/dL Albumin 3.0 (L) 3.4 - 5.0 g/dL Globulin 4.5 (H) 2.0 - 4.0 g/dL A-G Ratio 0.7 (L) 0.8 - 1.7 CARDIAC PANEL,(CK, CKMB & TROPONIN) Collection Time: 02/29/20  6:38 PM  
Result Value Ref Range  26 - 192 U/L  
 CK - MB 4.0 (H) <3.6 ng/ml CK-MB Index 2.4 0.0 - 4.0 % Troponin-I, QT 0.46 (H) 0.0 - 0.045 NG/ML  
CARDIAC PANEL,(CK, CKMB & TROPONIN) Collection Time: 03/01/20 12:56 AM  
Result Value Ref Range  (H) 26 - 192 U/L  
 CK - MB 3.6 (H) <3.6 ng/ml CK-MB Index 1.2 0.0 - 4.0 % Troponin-I, QT 0.38 (H) 0.0 - 0.045 NG/ML  
CBC W/O DIFF Collection Time: 03/01/20 12:56 AM  
Result Value Ref Range WBC 16.3 (H) 4.6 - 13.2 K/uL  
 RBC 3.92 (L) 4.20 - 5.30 M/uL  
 HGB 10.8 (L) 12.0 - 16.0 g/dL HCT 34.3 (L) 35.0 - 45.0 % MCV 87.5 74.0 - 97.0 FL  
 MCH 27.6 24.0 - 34.0 PG  
 MCHC 31.5 31.0 - 37.0 g/dL  
 RDW 15.7 (H) 11.6 - 14.5 % PLATELET 191 442 - 887 K/uL MPV 10.7 9.2 - 49.4 FL  
METABOLIC PANEL, BASIC Collection Time: 03/01/20 12:56 AM  
Result Value Ref Range Sodium 142 136 - 145 mmol/L Potassium 6.7 (HH) 3.5 - 5.5 mmol/L Chloride 117 (H) 100 - 111 mmol/L  
 CO2 20 (L) 21 - 32 mmol/L Anion gap 5 3.0 - 18 mmol/L Glucose 117 (H) 74 - 99 mg/dL BUN 32 (H) 7.0 - 18 MG/DL Creatinine 2.29 (H) 0.6 - 1.3 MG/DL  
 BUN/Creatinine ratio 14 12 - 20 GFR est AA 24 (L) >60 ml/min/1.73m2 GFR est non-AA 20 (L) >60 ml/min/1.73m2 Calcium 8.9 8.5 - 31.3 MG/DL  
METABOLIC PANEL, BASIC Collection Time: 03/01/20  9:45 AM  
Result Value Ref Range Sodium 143 136 - 145 mmol/L Potassium 5.4 3.5 - 5.5 mmol/L  Chloride 117 (H) 100 - 111 mmol/L  
 CO2 19 (L) 21 - 32 mmol/L Anion gap 7 3.0 - 18 mmol/L Glucose 122 (H) 74 - 99 mg/dL BUN 37 (H) 7.0 - 18 MG/DL Creatinine 2.70 (H) 0.6 - 1.3 MG/DL  
 BUN/Creatinine ratio 14 12 - 20 GFR est AA 20 (L) >60 ml/min/1.73m2 GFR est non-AA 16 (L) >60 ml/min/1.73m2 Calcium 8.8 8.5 - 10.1 MG/DL  
PHOSPHORUS Collection Time: 03/01/20  9:45 AM  
Result Value Ref Range Phosphorus 5.7 (H) 2.5 - 4.9 MG/DL  
GLUCOSE, POC Collection Time: 03/01/20 11:30 AM  
Result Value Ref Range Glucose (POC) 111 (H) 70 - 110 mg/dL Additional Data Reviewed:   
 
Signed By: Colton Aguila MD   
 March 1, 2020

## 2020-03-01 NOTE — PROGRESS NOTES
Problem: Pressure Injury - Risk of 
Goal: *Prevention of pressure injury Description Document Shay Scale and appropriate interventions in the flowsheet. Outcome: Progressing Towards Goal 
Note: Pressure Injury Interventions: 
Sensory Interventions: Keep linens dry and wrinkle-free Moisture Interventions: Maintain skin hydration (lotion/cream) Activity Interventions: PT/OT evaluation Mobility Interventions: Pressure redistribution bed/mattress (bed type) Nutrition Interventions: Document food/fluid/supplement intake Friction and Shear Interventions: Lift team/patient mobility team, Lift sheet Problem: Infection - Risk of, Urinary Catheter-Associated Urinary Tract Infection Goal: *Absence of infection signs and symptoms Outcome: Progressing Towards Goal 
  
Problem: General Medical Care Plan Goal: *Skin integrity maintained Outcome: Progressing Towards Goal 
  
Problem: Falls - Risk of 
Goal: *Absence of Falls Description Document Vic Arnold Fall Risk and appropriate interventions in the flowsheet. Outcome: Progressing Towards Goal 
Note: Fall Risk Interventions: 
  
 
Mentation Interventions: Family/sitter at bedside Medication Interventions: Bed/chair exit alarm Elimination Interventions: Toileting schedule/hourly rounds History of Falls Interventions: Investigate reason for fall

## 2020-03-01 NOTE — PROGRESS NOTES
Consolidation noted for TEODORO on CKD 4, patient presently in surgery, chart reviewed, discussed with Dr. Arabella Barillas, assessment and plan in brief as below. Patient is a 60-year-old -American female with history of hypertension, chronic kidney disease stage III/IV, history of dementia, hypertension, dyslipidemia who presented after a fall. Patient was found on the floor wedged by her bed. She was noted to have a right-sided complete acute fracture through the right femoral neck. She was then admitted for closed right hip fracture repair. She was noted to have hyperkalemia which was treated with Kayexalate, also noted to have elevated troponins. Assessment: #1. TEODORO on chronic kidney disease stage IV #2 hyperkalemia #3 metabolic acidosis with non-anion gap #4 hypertension #5 right hip fracture presently in surgery #6 elevated troponin's #7 hypernatremia Plan: #1 check renal panel post op #2 strict I/o #3 avoid NSAID ,nephrotoxins , IV contrast 
#4 urine studies ordered #5 gentle hydration with hypotonic bicarb drip Will f/u on patient post op Please call with questions, 
 
Asa Villagran MD [facility] Cell [phone] Pager: [phone]

## 2020-03-01 NOTE — CONSULTS
Orthopedic Surgery Consult Subjective:  
 
Matt Fitch is a 80 y.o.  female who is being seen for Right Hip Fracture. Onset of symptoms was abrupt with unchanged course since that time. The pain is located right hip. Patient describes the pain as continuous and rated as moderate and severe. Pain has been associated with movement. Patient denies head trauma or LOC. Symptoms are aggravated by movement. Symptoms improve with rest.  Previous studies include Right Hip xrays. Past Medical History:  
Diagnosis Date  Anemia NEC  Anxiety  Cataract   
 bilat  Colon cancer ([facility] [address] 1999  CRI (chronic renal insufficiency) 4/07  CVA (cerebral infarction) 4/01  
 right facial droop  CVA (cerebral infarction) 2/11  
 left post corona radiata  Dementia ([facility] [address]  Diverticulosis  Edema  GERD (gastroesophageal reflux disease)  Hypercholesterolemia  Hypertension  Hypomagnesemia 2/11  Lipoma of neck   
 right  Orthostatic hypotension  RBBB (right bundle branch block)  Syncope 6/08  TIA (transient ischemic attack) mid 1990's Past Surgical History:  
Procedure Laterality Date  ENDOSCOPY, COLON, DIAGNOSTIC  2006  HX BREAST BIOPSY  HX CATARACT REMOVAL  10/02  
 left  HX CATARACT REMOVAL  6/03  
 right  HX COLECTOMY  1999  
 right hemicolectomy  HX HYSTERECTOMY  HX LIPOMA RESECTION No family history on file. Social History Tobacco Use  Smoking status: Never Smoker  Smokeless tobacco: Never Used Substance Use Topics  Alcohol use: No  
   
Current Facility-Administered Medications Medication Dose Route Frequency Provider Last Rate Last Dose  
 0.9% sodium chloride infusion  50 mL/hr IntraVENous CONTINUOUS Rohit Coleman MD 50 mL/hr at 02/29/20 1805 50 mL/hr at 02/29/20 1805  felodipine (PLENDIL SR) 24 hr tablet 10 mg  10 mg Oral DAILY Rohit Coleman MD   10 mg at 03/01/20 0830  [START ON 3/2/2020] allopurinoL (ZYLOPRIM) tablet 100 mg  100 mg Oral EVERY MON & FRI Rohit Coleman MD      
 atorvastatin (LIPITOR) tablet 20 mg  20 mg Oral DAILY Rohit Coleman MD   20 mg at 03/01/20 3270  loratadine (CLARITIN) tablet 10 mg  10 mg Oral DAILY PRN Lovely Yu MD      
 cloNIDine HCL (CATAPRES) tablet 0.2 mg  0.2 mg Oral BID Rohit Coleman MD   0.2 mg at 02/29/20 1821  [Held by provider] furosemide (LASIX) tablet 20 mg  20 mg Oral DAILY Rohit Coleman MD      
 haloperidol (HALDOL) 2 mg/mL oral solution 2 mg  2 mg Oral Q6H PRN Rohit Coleman MD      
 heparin (porcine) injection 5,000 Units  5,000 Units SubCUTAneous Q8H Rohit Coleman MD   Stopped at 02/29/20 2200  morphine injection 1 mg  1 mg IntraVENous Q3H PRN Kia CALDWELL MD   1 mg at 03/01/20 4004  acetaminophen (TYLENOL) suppository 650 mg  650 mg Rectal Q6H PRN Lovely Yu MD      
  
 
Allergies Allergen Reactions  Shellfish Derived Hives and Nausea and Vomiting Review of Systems: A comprehensive review of systems was negative except for that written in the History of Present Illness. Objective: Intake and Output:   
No intake/output data recorded. 02/28 1901 - 03/01 0700 In: 360 [P.O.:360] Out: 575 [Urine:575] Physical Exam:  
General appearance: alert, cooperative, no distress, appears stated age Extremities: extremities normal, atraumatic, no cyanosis or edema. Tenderness noted about the right groin Neurologic: Alert and oriented X 3, normal strength and tone. Normal symmetric reflexes. Normal coordination and gait Right Hip and AP Pelvis FINDINGS: 
  
Complete fracture through the proximal right femur at the level of the femoral 
neck. Mild comminution suspected. There is some associated foreshortening of the 
right femur with slight superior lateral position of the distal femur relative to the right femoral head which is mildly rotated laterally. Fracture fragments 
appear impacted. 
  
Diffuse radiographic osteopenia. Advanced multilevel degenerative changes in the 
partially visualized lower lumbar spine. Extensive vascular calcifications 
noted. Mottled lucencies and opacities project over the bony pelvis due to 
overlying bowel contents. 
  
IMPRESSION Impression:  
  
Complete acute fracture through the right femoral neck as described Data Review:  
Recent Results (from the past 24 hour(s)) URINALYSIS W/ RFLX MICROSCOPIC Collection Time: 02/29/20 11:30 AM  
Result Value Ref Range Color YELLOW Appearance CLEAR Specific gravity 1.018 1.005 - 1.030    
 pH (UA) 5.0 5.0 - 8.0 Protein 100 (A) NEG mg/dL Glucose NEGATIVE  NEG mg/dL Ketone NEGATIVE  NEG mg/dL Bilirubin NEGATIVE  NEG Blood NEGATIVE  NEG Urobilinogen 0.2 0.2 - 1.0 EU/dL Nitrites NEGATIVE  NEG Leukocyte Esterase NEGATIVE  NEG    
URINE MICROSCOPIC ONLY Collection Time: 02/29/20 11:30 AM  
Result Value Ref Range WBC 0 to 3 0 - 4 /hpf  
 RBC NONE 0 - 5 /hpf Epithelial cells 2+ 0 - 5 /lpf Bacteria 1+ (A) NEG /hpf Mucus 1+ (A) NEG /lpf Yeast FEW (A) NEG    
CBC WITH AUTOMATED DIFF Collection Time: 02/29/20 11:58 AM  
Result Value Ref Range WBC 14.1 (H) 4.6 - 13.2 K/uL  
 RBC 4.01 (L) 4.20 - 5.30 M/uL  
 HGB 10.9 (L) 12.0 - 16.0 g/dL HCT 34.6 (L) 35.0 - 45.0 % MCV 86.3 74.0 - 97.0 FL  
 MCH 27.2 24.0 - 34.0 PG  
 MCHC 31.5 31.0 - 37.0 g/dL  
 RDW 15.4 (H) 11.6 - 14.5 % PLATELET 782 124 - 152 K/uL MPV 10.3 9.2 - 11.8 FL  
 NEUTROPHILS 89 (H) 40 - 73 % LYMPHOCYTES 6 (L) 21 - 52 % MONOCYTES 5 3 - 10 % EOSINOPHILS 0 0 - 5 % BASOPHILS 0 0 - 2 %  
 ABS. NEUTROPHILS 12.7 (H) 1.8 - 8.0 K/UL  
 ABS. LYMPHOCYTES 0.8 (L) 0.9 - 3.6 K/UL  
 ABS. MONOCYTES 0.6 0.05 - 1.2 K/UL  
 ABS. EOSINOPHILS 0.0 0.0 - 0.4 K/UL  
 ABS. BASOPHILS 0.0 0.0 - 0.1 K/UL DF AUTOMATED METABOLIC PANEL, BASIC Collection Time: 02/29/20 11:58 AM  
Result Value Ref Range Sodium 141 136 - 145 mmol/L Potassium 5.7 (H) 3.5 - 5.5 mmol/L Chloride 115 (H) 100 - 111 mmol/L  
 CO2 23 21 - 32 mmol/L Anion gap 3 3.0 - 18 mmol/L Glucose 103 (H) 74 - 99 mg/dL BUN 28 (H) 7.0 - 18 MG/DL Creatinine 1.68 (H) 0.6 - 1.3 MG/DL  
 BUN/Creatinine ratio 17 12 - 20 GFR est AA 34 (L) >60 ml/min/1.73m2 GFR est non-AA 28 (L) >60 ml/min/1.73m2 Calcium 9.2 8.5 - 10.1 MG/DL  
CARDIAC PANEL,(CK, CKMB & TROPONIN) Collection Time: 02/29/20 11:58 AM  
Result Value Ref Range  26 - 192 U/L  
 CK - MB 4.8 (H) <3.6 ng/ml CK-MB Index 3.9 0.0 - 4.0 % Troponin-I, QT 0.48 (H) 0.0 - 0.045 NG/ML  
PTT Collection Time: 02/29/20 11:58 AM  
Result Value Ref Range aPTT 29.1 23.0 - 36.4 SEC PROTHROMBIN TIME + INR Collection Time: 02/29/20 11:58 AM  
Result Value Ref Range Prothrombin time 13.8 11.5 - 15.2 sec INR 1.1 0.8 - 1.2 EKG, 12 LEAD, INITIAL Collection Time: 02/29/20 12:01 PM  
Result Value Ref Range Ventricular Rate 84 BPM  
 Atrial Rate 84 BPM  
 P-R Interval 132 ms QRS Duration 112 ms  
 Q-T Interval 362 ms QTC Calculation (Bezet) 427 ms Calculated P Axis 68 degrees Calculated R Axis 72 degrees Calculated T Axis -126 degrees Diagnosis Normal sinus rhythm Right bundle branch block T wave abnormality, consider inferior ischemia Abnormal ECG When compared with ECG of 06-JAN-2020 10:04, 
ST now depressed in Inferior leads QT has shortened METABOLIC PANEL, COMPREHENSIVE Collection Time: 02/29/20  6:38 PM  
Result Value Ref Range Sodium 141 136 - 145 mmol/L Potassium 6.1 (HH) 3.5 - 5.5 mmol/L Chloride 115 (H) 100 - 111 mmol/L  
 CO2 21 21 - 32 mmol/L Anion gap 5 3.0 - 18 mmol/L Glucose 108 (H) 74 - 99 mg/dL BUN 29 (H) 7.0 - 18 MG/DL  Creatinine 1.84 (H) 0.6 - 1.3 MG/DL  
 BUN/Creatinine ratio 16 12 - 20 GFR est AA 31 (L) >60 ml/min/1.73m2 GFR est non-AA 25 (L) >60 ml/min/1.73m2 Calcium 8.8 8.5 - 10.1 MG/DL Bilirubin, total 0.5 0.2 - 1.0 MG/DL  
 ALT (SGPT) 28 13 - 56 U/L  
 AST (SGOT) 29 10 - 38 U/L Alk. phosphatase 86 45 - 117 U/L Protein, total 7.5 6.4 - 8.2 g/dL Albumin 3.0 (L) 3.4 - 5.0 g/dL Globulin 4.5 (H) 2.0 - 4.0 g/dL A-G Ratio 0.7 (L) 0.8 - 1.7 CARDIAC PANEL,(CK, CKMB & TROPONIN) Collection Time: 02/29/20  6:38 PM  
Result Value Ref Range  26 - 192 U/L  
 CK - MB 4.0 (H) <3.6 ng/ml CK-MB Index 2.4 0.0 - 4.0 % Troponin-I, QT 0.46 (H) 0.0 - 0.045 NG/ML  
CARDIAC PANEL,(CK, CKMB & TROPONIN) Collection Time: 03/01/20 12:56 AM  
Result Value Ref Range  (H) 26 - 192 U/L  
 CK - MB 3.6 (H) <3.6 ng/ml CK-MB Index 1.2 0.0 - 4.0 % Troponin-I, QT 0.38 (H) 0.0 - 0.045 NG/ML  
CBC W/O DIFF Collection Time: 03/01/20 12:56 AM  
Result Value Ref Range WBC 16.3 (H) 4.6 - 13.2 K/uL  
 RBC 3.92 (L) 4.20 - 5.30 M/uL  
 HGB 10.8 (L) 12.0 - 16.0 g/dL HCT 34.3 (L) 35.0 - 45.0 % MCV 87.5 74.0 - 97.0 FL  
 MCH 27.6 24.0 - 34.0 PG  
 MCHC 31.5 31.0 - 37.0 g/dL  
 RDW 15.7 (H) 11.6 - 14.5 % PLATELET 854 790 - 512 K/uL MPV 10.7 9.2 - 53.6 FL  
METABOLIC PANEL, BASIC Collection Time: 03/01/20 12:56 AM  
Result Value Ref Range Sodium 142 136 - 145 mmol/L Potassium 6.7 (HH) 3.5 - 5.5 mmol/L Chloride 117 (H) 100 - 111 mmol/L  
 CO2 20 (L) 21 - 32 mmol/L Anion gap 5 3.0 - 18 mmol/L Glucose 117 (H) 74 - 99 mg/dL BUN 32 (H) 7.0 - 18 MG/DL Creatinine 2.29 (H) 0.6 - 1.3 MG/DL  
 BUN/Creatinine ratio 14 12 - 20 GFR est AA 24 (L) >60 ml/min/1.73m2 GFR est non-AA 20 (L) >60 ml/min/1.73m2 Calcium 8.9 8.5 - 10.1 MG/DL Assessment:  
 
Active Problems: Hyperkalemia (2/29/2020) Chronic kidney disease (2/29/2020) Elevated troponin (2/29/2020) Closed right hip fracture, initial encounter ([facility] [address] (2/29/2020) Plan:  
 
Plan for Right Hip Jayson-Arthroplasty PT/OT 
DVT Px X 4 weeks to start 12-24 hours after surgery - Recommend Lovenox Ice to Right Hip DC Planning

## 2020-03-01 NOTE — PROGRESS NOTES
1996: Notified by lab that patient's potassium had increased to 6.7. Paged Hospitalist.  
 
0330: Dr. Rodrick Olmstead returned call, will enter orders. 0430: Kayexalate given rectally per order (see MAR), however patient has still not produced a bowel movement. Vitals stable. Heart rate regular, 71 bpm, NSR on telemetry. Will monitor.

## 2020-03-01 NOTE — PROGRESS NOTES
Plan for Right Hip Jayson-Arthroplasty tomorrow AM  
 
Hold blood thinners Npo after midnight Full consult to follow

## 2020-03-01 NOTE — ROUTINE PROCESS
Patient returned to the floor from PACU. She is drowsy, but easily aroused. No distress noted. She has family members at the bedside. Drsg to [address]. Hip is D&I. Wedge pillow is between legs.

## 2020-03-01 NOTE — CONSULTS
[facility] - Consult Note Consultation request by Dr. Eisenberg Delay for abnormal troponin Date of  Admission: 2/29/2020 10:35 AM  
Primary Care Physician:  Sandra Cummings MD 
 
 Assessment:  
 
-s/p mechanical fall 2/29/20 with right hip hemiarthroplasty 3/1/20 by Dr. Romero Aleman 
-Indeterminate troponin, not consistent with primary ACS. Seen January 2020 in setting of UTI with conservative management at that time  
-Presumed CAD history 
-Chronic Plavix/ASA use 
-Chronic RBBB 
-Chronic diastolic heart failure on lasix 20 mg as OP. Echo 8/2019 EF 55%, moderate asymmetric LVH 
-h/o moderate pulmonary HTN at 54 mmHg by echo 8/2019 
-h/o HTN 
-Remote stroke 
-h/o Alzheimers dementia 
-HLD on statin 
-CKD stage III 
-DNR status Primary cardiologist Dr. Rosario Mosquera, and recently seen by Dr. Allie Hagan also Plan:  
 
Seen immediately post-op, discussed with Dr. Chidi Koch. Plan limited echo for completeness and conservative management only for abnormal troponin, not consistent with ACS. History of Present Illness: This is a 80 y.o. female admitted for Closed right hip fracture, initial encounter ([facility] [address] Bridgett Man; Hyperkalemia [E87.5]; Chronic kidney disease [N18.9]; Elevated troponin [R79.89]. Patient complains of: S/p mechanical fall yesterday, found to have right hip fracture, repaired today. Asked to see for abnormal troponin. No chest pain currently or by report. No recent dyspnea or change in medications. Recently discharged from [facility] on 1/9/20 for AMS, UTI. Seen at that time for indeterminate troponin, no symptoms, conservatively treated. Seen in PACU, very confused but no obvious pain. Review of Symptoms:  Except as stated above include: Unable due to dementia. Past Medical History:  
 
Past Medical History:  
Diagnosis Date  Anemia NEC  Anxiety  Cataract   
 bilat  Colon cancer ([facility] [address] 1999  CRI (chronic renal insufficiency) 4/07  CVA (cerebral infarction) 4/01  
 right facial droop  CVA (cerebral infarction) 2/11  
 left post corona radiata  Dementia ([facility] [address]  Diverticulosis  Edema  GERD (gastroesophageal reflux disease)  Hypercholesterolemia  Hypertension  Hypomagnesemia 2/11  Lipoma of neck   
 right  Orthostatic hypotension  RBBB (right bundle branch block)  Syncope 6/08  TIA (transient ischemic attack) mid 1990's Social History:  
 
Social History Socioeconomic History  Marital status:  Spouse name: Not on file  Number of children: Not on file  Years of education: Not on file  Highest education level: Not on file Tobacco Use  Smoking status: Never Smoker  Smokeless tobacco: Never Used Substance and Sexual Activity  Alcohol use: No  
 Sexual activity: Never Family History:  
History reviewed. No pertinent family history. Medications: Allergies Allergen Reactions  Shellfish Derived Hives and Nausea and Vomiting Current Facility-Administered Medications Medication Dose Route Frequency  sodium chloride (NS) flush 5-40 mL  5-40 mL IntraVENous Q8H  
 sodium chloride (NS) flush 5-40 mL  5-40 mL IntraVENous Q8H  
 sodium chloride (NS) flush 5-40 mL  5-40 mL IntraVENous PRN  
 bacitracin injection    PRN  
 sodium chloride (NS) flush 5-40 mL  5-40 mL IntraVENous Q8H  
 sodium chloride (NS) flush 5-40 mL  5-40 mL IntraVENous PRN  
 insulin lispro (HUMALOG) injection   SubCUTAneous ONCE  
 glucose chewable tablet 16 g  4 Tab Oral PRN  
 glucagon (GLUCAGEN) injection 1 mg  1 mg IntraMUSCular PRN  
 dextrose (D50W) injection syrg 12.5-25 g  25-50 mL IntraVENous PRN  
 lactated Ringers infusion  75 mL/hr IntraVENous CONTINUOUS  
 HYDROmorphone (PF) (DILAUDID) injection 0.5 mg  0.5 mg IntraVENous Q10MIN PRN  
 ondansetron (ZOFRAN) injection 4 mg  4 mg IntraVENous ONCE  
  diphenhydrAMINE (BENADRYL) injection 12.5 mg  12.5 mg IntraVENous Multiple  nalbuphine (NUBAIN) injection 5 mg  5 mg IntraVENous Multiple  bupivacaine (PF) (MARCAINE) 0.5 % (5 mg/mL) injection    PRN  
 0.9% sodium chloride infusion  50 mL/hr IntraVENous CONTINUOUS  
 felodipine (PLENDIL SR) 24 hr tablet 10 mg  10 mg Oral DAILY  [START ON 3/2/2020] allopurinoL (ZYLOPRIM) tablet 100 mg  100 mg Oral EVERY MON & FRI  atorvastatin (LIPITOR) tablet 20 mg  20 mg Oral DAILY  loratadine (CLARITIN) tablet 10 mg  10 mg Oral DAILY PRN  
 cloNIDine HCL (CATAPRES) tablet 0.2 mg  0.2 mg Oral BID  [Held by provider] furosemide (LASIX) tablet 20 mg  20 mg Oral DAILY  haloperidol (HALDOL) 2 mg/mL oral solution 2 mg  2 mg Oral Q6H PRN  
 heparin (porcine) injection 5,000 Units  5,000 Units SubCUTAneous Q8H  
 morphine injection 1 mg  1 mg IntraVENous Q3H PRN  
 acetaminophen (TYLENOL) suppository 650 mg  650 mg Rectal Q6H PRN Facility-Administered Medications Ordered in Other Encounters Medication Dose Route Frequency  fat emulsion 20% (LIPOSYN, INTRAlipid) infusion    PRN  
 fentaNYL citrate (PF) injection   IntraVENous PRN  
 rocuronium injection   IntraVENous PRN  propofol (DIPRIVAN) 10 mg/mL injection   IntraVENous PRN  
 ondansetron (ZOFRAN) injection   IntraVENous PRN  
 vasopressin (VASOSTRICT) 20 unit/mL injection    PRN  
 glycopyrrolate (ROBINUL) injection   IntraVENous PRN  
 EPINEPHrine (ADRENALIN) 0.1 mg/mL syringe    PRN Physical Exam:  
 
Visit Vitals /61 (BP 1 Location: Right arm) Pulse 61 Temp 97 °F (36.1 °C) Resp 18 Ht 4' 11\" (1.499 m) Wt 59.6 kg (131 lb 8 oz) SpO2 99% Breastfeeding No  
BMI 26.56 kg/m² BP Readings from Last 3 Encounters:  
03/01/20 135/61  
01/09/20 (!) 204/80  
12/19/19 122/64 Pulse Readings from Last 3 Encounters:  
03/01/20 61  
01/09/20 (!) 109  
12/19/19 (!) 56 Wt Readings from Last 3 Encounters: 02/29/20 59.6 kg (131 lb 8 oz) 01/08/20 60.5 kg (133 lb 4.8 oz) 12/19/19 57.6 kg (127 lb) General:  confused Neck:  no nuchal rigidity Lungs:  clear to auscultation bilaterally Heart:  regular rate and rhythm, S1, S2 normal, no murmur, click, rub or gallop Abdomen:  abdomen is soft without significant tenderness, masses, organomegaly or guarding Extremities:  extremities normal, atraumatic, no cyanosis or edema Skin: Warm and dry. no hyperpigmentation, vitiligo, or suspicious lesions Neuro: alert, oriented x3, affect appropriate, no focal neurological deficits, moves all extremities well, no involuntary movements, reflexes at knee and ankle intact Psych: non focal 
 
 Data Review:  
 
Recent Labs 03/01/20 
0056 02/29/20 
1158 WBC 16.3* 14.1* HGB 10.8* 10.9* HCT 34.3* 34.6*  257 Recent Labs 03/01/20 
0945 03/01/20 
0056 02/29/20 
1838 02/29/20 
1158  142 141 141  
K 5.4 6.7* 6.1* 5.7*  
* 117* 115* 115* CO2 19* 20* 21 23 * 117* 108* 103* BUN 37* 32* 29* 28* CREA 2.70* 2.29* 1.84* 1.68* CA 8.8 8.9 8.8 9.2 PHOS 5.7*  --   --   --   
ALB  --   --  3.0*  --   
SGOT  --   --  29  --   
ALT  --   --  28  --   
INR  --   --   --  1.1 Results for orders placed or performed during the hospital encounter of 02/29/20 EKG, 12 LEAD, INITIAL Result Value Ref Range Ventricular Rate 84 BPM  
 Atrial Rate 84 BPM  
 P-R Interval 132 ms QRS Duration 112 ms  
 Q-T Interval 362 ms QTC Calculation (Bezet) 427 ms Calculated P Axis 68 degrees Calculated R Axis 72 degrees Calculated T Axis -126 degrees Diagnosis Normal sinus rhythm Right bundle branch block T wave abnormality, consider inferior ischemia Abnormal ECG Confirmed by Silvio Shea MD, Aris Martinez ([id number]) on 3/1/2020 9:37:44 AM 
  
Results for orders placed or performed in visit on 06/13/19 AMB POC EKG ROUTINE W/ 12 LEADS, INTER & REP Narrative Read by Marlon Blank MD - Sinus bradycardia. RBBB and right axis - possible right ventricular hypertrophy. Consider pulmonary disease. T-abnormality. Possible anterolateral and inferior ischemia. All Cardiac Markers in the last 24 hours:   
Lab Results Component Value Date/Time  (H) 03/01/2020 12:56 AM  
  02/29/2020 06:38 PM  
 CKMB 3.6 (H) 03/01/2020 12:56 AM  
 CKMB 4.0 (H) 02/29/2020 06:38 PM  
 CKND1 1.2 03/01/2020 12:56 AM  
 CKND1 2.4 02/29/2020 06:38 PM  
 TROIQ 0.38 (H) 03/01/2020 12:56 AM  
 TROIQ 0.46 (H) 02/29/2020 06:38 PM  
 
 
Last Lipid:   
Lab Results Component Value Date/Time Cholesterol, total 123 06/06/2019 05:38 AM  
 HDL Cholesterol 62 (H) 06/06/2019 05:38 AM  
 LDL, calculated 44.8 06/06/2019 05:38 AM  
 Triglyceride 81 06/06/2019 05:38 AM  
 CHOL/HDL Ratio 2.0 06/06/2019 05:38 AM  
 
 
Signed By: Antonio Sosa MD   
 March 1, 2020

## 2020-03-01 NOTE — BRIEF OP NOTE
BRIEF OPERATIVE NOTE Date of Procedure: 3/1/2020 Preoperative Diagnosis: Right Femoral Neck Fracture Postoperative Diagnosis: Right Femoral Neck Fracture Procedure(s): HIP HEMIARTHROPLASTY Surgeon(s) and Role: Ajay Peña DO - Primary Surgical Assistant: Adonay Muniz and Nataly Barrera Surgical Staff: 
Circ-1: Valery Baltazar RN Scrub Tech-1: Court Chance Scrub Tech-2: Dawood Beepool Surg Asst-1: Fito Escobar Surg Asst-2: [place] Event Time In Time Out Incision Start 03/01/2020 1438 Incision Close Anesthesia: General + Regional 
Estimated Blood Loss: 250 mL Specimens: * No specimens in log * Findings: displaced femoral neck fracture Complications: none Implants:  
Implant Name Type Inv. Item Serial No.  Lot No. LRB No. Used Action HEAD FEM [facility] [id number] GRY --  - [id number]  HEAD FEM [facility] 43X28 GRY --   [id number] [id number] Right 1 Implanted  
femoral stem 12/14 tapered size four std 140mm    DEPU[employer]TH[employer] [id number] Right 1 Implanted HEAD FEM 28MM +5MM NK --  - [id number]  HEAD FEM 28MM +5MM NK --   [employer] [id number] Right 1 Implanted

## 2020-03-01 NOTE — ROUTINE PROCESS
Bedside shift change report given to Devin Berry RN (oncoming nurse) by Giovany Galdamez RN (offgoing nurse). Report included the following information SBAR, Kardex, Intake/Output, MAR, Recent Results and Med Rec Status.

## 2020-03-01 NOTE — ANESTHESIA POSTPROCEDURE EVALUATION
Procedure(s): HIP HEMIARTHROPLASTY. general 
 
Anesthesia Post Evaluation Multimodal analgesia: multimodal analgesia used between 6 hours prior to anesthesia start to PACU discharge Patient location during evaluation: bedside Patient participation: complete - patient participated Level of consciousness: awake Pain management: adequate Airway patency: patent Anesthetic complications: no 
Cardiovascular status: stable Respiratory status: acceptable Hydration status: acceptable Post anesthesia nausea and vomiting:  controlled Vitals Value Taken Time BP Temp Pulse 72 3/1/2020  4:11 PM  
Resp 23 3/1/2020  4:11 PM  
SpO2 100 % 3/1/2020  4:11 PM  
Vitals shown include unvalidated device data.

## 2020-03-01 NOTE — PERIOP NOTES
TRANSFER - OUT REPORT: 
 
Verbal report given to Ifrah Patterson RN on [place] Standing  being transferred to (unit) for routine post - op Report consisted of patients Situation, Background, Assessment and  
Recommendations(SBAR). Information from the following report(s) SBAR, Kardex, Procedure Summary, Intake/Output, MAR and Recent Results was reviewed with the receiving nurse. Lines:  
Peripheral IV 02/29/20 Right Antecubital (Active) Site Assessment Clean, dry, & intact 3/1/2020  4:11 PM  
Phlebitis Assessment 0 3/1/2020  4:11 PM  
Infiltration Assessment 0 3/1/2020  4:11 PM  
Dressing Status Clean, dry, & intact 3/1/2020  4:11 PM  
Dressing Type Tape;Transparent 3/1/2020  4:11 PM  
Hub Color/Line Status Pink; Infusing 3/1/2020  4:11 PM  
Action Taken Open ports on tubing capped 2/29/2020  7:28 PM  
Alcohol Cap Used Yes 3/1/2020  7:32 AM  
   
Arterial Line 03/01/20 Left Radial artery (Active) Site Assessment Clean, dry, & intact 3/1/2020  4:11 PM  
Dressing Status Clean, dry, & intact 3/1/2020  4:11 PM  
Line Status Intact and in place 3/1/2020  4:11 PM  
Treatment Zeroed or re-zeroed 3/1/2020  4:11 PM  
  
Visit Vitals BP (!) 112/36 Pulse 88 Temp 98.1 °F (36.7 °C) Resp 24 Ht 4' 11\" (1.499 m) Wt 59.6 kg (131 lb 8 oz) SpO2 100% Breastfeeding No  
BMI 26.56 kg/m² Opportunity for questions and clarification was provided. Patient transported with: 
 O2 @ 3 liters Tech 
 
__________________________________________________________ Informed anesthesia about pt's diastolic BP. Anesthesia aware, No new orders received.

## 2020-03-01 NOTE — ROUTINE PROCESS
Report given to Winter Mahan RN, including SBAR, [facility], and Kardex. Patient resting comfortably, no apparent distress.

## 2020-03-01 NOTE — PROGRESS NOTES
Patient has still not moved her bowels after Kayexalate. While assessing vitals, O2 saturation noted to be 88% on room air. O2 applied via nasal cannula at 2LPM, and sats increased to 99%.

## 2020-03-01 NOTE — ANESTHESIA PROCEDURE NOTES
Arterial Line Placement Start time: 3/1/2020 2:15 PM 
Performed by: Gerardo Zaidi CRNA Authorized by: Ana Maria Christiansen MD  
 
Pre-Procedure Indications:  Arterial pressure monitoring Preanesthetic Checklist: patient identified, risks and benefits discussed, anesthesia consent, site marked, patient being monitored, timeout performed and patient being monitored Timeout Time: 14:15 Procedure:  
Prep:  Chlorhexidine Seldinger Technique?: Yes Orientation:  Left Location:  Radial artery Catheter size:  20 G Number of attempts:  1 Cont Cardiac Output Sensor: No   
 
Assessment:  
Post-procedure:  Line secured and sterile dressing applied Patient Tolerance:  Patient tolerated the procedure well with no immediate complications Comment:  
Sterile prep drape technique used

## 2020-03-01 NOTE — PROGRESS NOTES
1900: Assumed care of patient. Bedside verbal report received from [facility], RN including SBAR, MAR, and Kardex. Vitals within normal limits. Patient resting with eyes closed, respirations nonlabored, no apparent distress. Caregiver Sharon at bedside. 1918: Received call from [address] from the lab with critical potassium 6.1. Hospitalist paged, awaiting return call. 1925: Dr. Stephen Zarate called, will review chart and enter new orders.

## 2020-03-01 NOTE — PROGRESS NOTES
[facility] 2. Progress Note Patient: Vamshi Sosa               Sex: female          DOA: 2/29/2020 YOB: 1922      Age:  80 y.o.        LOS:  LOS: 1 day S/P  Procedure(s): HIP HEMIARTHROPLASTY Subjective: Moderate pain, voiding, pt has history of dementia, family member at bedside. All questions answered, surgery planned for today. Denies cp, sob, abd pain Objective:  
  
Blood pressure 158/71, pulse 78, temperature 98.1 °F (36.7 °C), resp. rate 18, height 4' 11\" (1.499 m), weight 131 lb 8 oz (59.6 kg), SpO2 99 %, not currently breastfeeding. Well developed, Well Nourished alert, cooperative, no distress, confused 
swelling and tenderness noted in right lower extremity, RLE shortened and externally rotated, ROM not assessed due to pain Sensory is intact Motor is intact 
nv intact 2+distal pulses Neg calf tenderness Neg for facial asymmetry Labs: 
CBC 
@ 
CBC:  
Lab Results Component Value Date/Time WBC 16.3 (H) 03/01/2020 12:56 AM  
 RBC 3.92 (L) 03/01/2020 12:56 AM  
 HGB 10.8 (L) 03/01/2020 12:56 AM  
 HCT 34.3 (L) 03/01/2020 12:56 AM  
 PLATELET 816 98/77/2974 12:56 AM  
 
CMP:  
Lab Results Component Value Date/Time Glucose 117 (H) 03/01/2020 12:56 AM  
 Sodium 142 03/01/2020 12:56 AM  
 Potassium 6.7 (HH) 03/01/2020 12:56 AM  
 Chloride 117 (H) 03/01/2020 12:56 AM  
 CO2 20 (L) 03/01/2020 12:56 AM  
 BUN 32 (H) 03/01/2020 12:56 AM  
 Creatinine 2.29 (H) 03/01/2020 12:56 AM  
 Calcium 8.9 03/01/2020 12:56 AM  
 Anion gap 5 03/01/2020 12:56 AM  
 BUN/Creatinine ratio 14 03/01/2020 12:56 AM  
 Alk. phosphatase 86 02/29/2020 06:38 PM  
 Protein, total 7.5 02/29/2020 06:38 PM  
 Albumin 3.0 (L) 02/29/2020 06:38 PM  
 Globulin 4.5 (H) 02/29/2020 06:38 PM  
 A-G Ratio 0.7 (L) 02/29/2020 06:38 PM  
@ Coagulation Lab Results Component Value Date INR 1.1 02/29/2020 APTT 29.1 02/29/2020 Basic Metabolic Profile Lab Results Component Value Date  03/01/2020 CO2 20 (L) 03/01/2020 BUN 32 (H) 03/01/2020 Medications Reviewed Assessment/Plan Active Problems: Hyperkalemia (2/29/2020) Chronic kidney disease (2/29/2020) Elevated troponin (2/29/2020) Closed right hip fracture, initial encounter ([facility] [address] (2/29/2020) Procedure(s): HIP HEMIARTHROPLASTY :  
 
1. PT and/or OT Consults: NO continue PT/OT: bed rest until surgery 2. NPO now 3. Pain control 4. DVT Prophylaxis - Held for surgery today DISCHARGE PLANNING  Intervention : Will likely need [facility] (SNF) or rehab placement. Will have PT evaluate after surgery. PAUL [facility]helan [facility] [phone]

## 2020-03-01 NOTE — PROGRESS NOTES
Patient with P.O. S.T and DNR on file. Placed call to [facility]. Cory Claude, [phone], To affirm status has not changed- voice mail-  
Left message to return my call re his mom's Advanced Directives and my personal phone number.

## 2020-03-01 NOTE — ANESTHESIA PROCEDURE NOTES
Peripheral Block Start time: 3/1/2020 2:10 PM 
End time: 3/1/2020 2:11 PM 
Performed by: Riley Moses MD 
Authorized by: Riley Moses MD  
 
 
Pre-procedure: Indications: at surgeon's request and post-op pain management Preanesthetic Checklist: patient identified, risks and benefits discussed, site marked, timeout performed, anesthesia consent given and patient being monitored Block Type:  
Block Type:  Fascia iliaca Laterality:  Right Monitoring:  Standard ASA monitoring, continuous pulse ox, frequent vital sign checks, heart rate and oxygen Injection Technique:  Single shot Procedures: other (comment) Patient Position: supine Prep: chlorhexidine Location:  Upper thigh Needle Gauge:  21 G Needle Localization:  Anatomical landmarks and infiltration Assessment: 
Number of attempts:  1 Injection Assessment:  Incremental injection every 5 mL, no paresthesia, no intravascular symptoms and negative aspiration for blood 
 
precedex 25 mcg added Pt bradycardica and hypotensive after injection of local anesthestic. Intralipids intitiated and pt stabilized. Nicki this was due to introssous spread and nerve block placment was near femoral neck fracture.

## 2020-03-01 NOTE — ROUTINE PROCESS
Bedside and Verbal shift change report given to PATT Smith (oncoming nurse) by Marleen Solomon (offgoing nurse). Report included the following information SBAR, Kardex, Intake/Output, MAR and Recent Results.

## 2020-03-01 NOTE — ANESTHESIA PREPROCEDURE EVALUATION
Relevant Problems No relevant active problems Anesthetic History No history of anesthetic complications Review of Systems / Medical History Patient summary reviewed, nursing notes reviewed and pertinent labs reviewed Pulmonary Neuro/Psych CVA 
TIA and dementia Cardiovascular Hypertension Dysrhythmias Hyperlipidemia Exercise tolerance: <4 METS 
  
GI/Hepatic/Renal 
  
GERD Renal disease: CRI Endo/Other Cancer Other Findings Comments: DNR Physical Exam 
 
Airway Mallampati: III 
TM Distance: 4 - 6 cm Neck ROM: normal range of motion Mouth opening: Normal 
 
 Cardiovascular Regular rate and rhythm,  S1 and S2 normal,  no murmur, click, rub, or gallop Rate: normal 
 
 
 
 Dental 
 
Dentition: Poor dentition Pulmonary Breath sounds clear to auscultation Abdominal 
GI exam deferred Other Findings Anesthetic Plan ASA: 4, emergent Post-op pain plan if not by surgeon: peripheral nerve block single Induction: Intravenous Anesthetic plan and risks discussed with: Patient and Family

## 2020-03-02 NOTE — PROGRESS NOTES
Reason for Admission:   Closed right hip fracture, initial encounter ([facility] [address] Brenita Omani Hyperkalemia [E87.5] Chronic kidney disease [N18.9] Elevated troponin [R79.89] RRAT Score:    30% Resources/supports as identified by patient/family:   Ashish Greene, dipti ([phone]) is  POA. Patient owns home and  grandson, Reggie Pope lives in home with patient. Has private duty aide from 9 am -10 pm and pays out of pocket.  
  
 
Top Challenges facing patient (as identified by patient/family and CM): Finances/Medication cost?     n/a Transportation      Grandson or aide provides transportation. Support system or lack thereof? Very good support. Living arrangements? Patient owns home and grandson, lives with patient. Self-care/ADLs/Cognition? Needs assistance and Alert to person. Current Advanced Directive/Advance Care Plan:   yes Plan for utilizing home health:    no 
                   
Likelihood of readmission:   HIGH Transition of Care Plan:               
 
 
Initial assessment completed with yes. Cognitive status of patient: oriented to person Face sheet information confirmed:  yes. Ashish Greene, son ([phone]) provided needed information. This patient lives in a single family home with grandson. Patient is not able to navigate steps as needed. Prior to hospitalization, patient was considered to be independent with ADLs/IADLS : no . If not independent,  patient needs assist with : dressing, bathing, food preparation, cooking and toileting. Patient's has a private duty aide from 9 am to 10 pm.  
 
Patient has a current ACP document on file: yes Transportation to be determined at discharge. The patient already has [facility] [address], W/C, and hospital bed medical equipment available in the home. Patient is not currently active with home health. I.  Patient has not stayed in a skilled nursing facility or rehab. This patient is on dialysis :no 
 
List of available SNF agencies were provided and reviewed with the patient prior to discharge. Freedom of choice signed: yes, for [facility] and placed in cc/[employer]. Currently, the discharge plan is SNF. CM will continue to transitional needs. The patient states that she can obtain her medications from the pharmacy, and take her medications as directed. Patient's current insurance is Medicare part [facility]. Care Management Interventions PCP Verified by CM: Yes(per son, saw pcp in February 2020.) Mode of Transport at Discharge: Self Transition of Care Consult (CM Consult): Discharge Planning, SNF Discharge Durable Medical Equipment: No 
Physical Therapy Consult: Yes Occupational Therapy Consult: Yes Speech Therapy Consult: No 
Current Support Network: Own Home, Has Personal Caregivers The Plan for Transition of Care is Related to the Following Treatment Goals : SNF placement The Patient and/or Patient Representative was Provided with a Choice of Provider and Agrees with the Discharge Plan?: Yes Freedom of Choice List was Provided with Basic Dialogue that Supports the Patient's Individualized Plan of Care/Goals, Treatment Preferences and Shares the Quality Data Associated with the Providers?: Yes Discharge Location Discharge Placement: Skilled nursing facility [facility]A [place], RN Pager # [phone] Care Manager

## 2020-03-02 NOTE — OP NOTES
[facility] 
OPERATIVE REPORT Name:  Hannah Saab 
MR#:   [id number] :  [id number] ACCOUNT #:  [de-identified] DATE OF SERVICE:  2020 PREOPERATIVE DIAGNOSIS:  Right femoral neck fracture. POSTOPERATIVE DIAGNOSIS:  Right femoral neck fracture. PROCEDURE PERFORMED:  Right hip hemiarthroplasty. SURGEON:  Nnai Rinaldi DO 
 
ASSISTANTS:  Jonathon Michelle and Anisha Flynn ANESTHESIA:  General and regional. 
 
COMPLICATIONS:  None. SPECIMENS REMOVED:  None. IMPLANTS:  43 x 28 femoral head,  tapered size 4 standard 140-mm femoral stem and a +5 mm femoral head. ESTIMATED BLOOD LOSS:  250 mL. FINDINGS:  Displaced femoral neck fracture. INDICATIONS:  Informed consent was obtained from the patient's son. This was verbally obtained on the previous day, however, he was unreachable today. With anesthesia, we agreed to consent to both anesthesia and the operative procedure. The risks and benefits of the procedure were discussed with the son on the previous day, and he agreed to have surgery for his mother. The risks and benefits include periprosthetic fracture, blood loss, neurovascular injury, need for further surgery, chronic pain, and complications from anesthesia including death. DESCRIPTION OF PROCEDURE:  After consent, the patient was taken back to the operative suite. The right lower extremity was prepped and draped in a normal sterile fashion after being placed into the lateral recumbent position. An incision was made over the greater trochanter, approximately 10 cm laterally. The subcutaneous tissues were dissected with electrocautery. The IT band was identified and incised in its central portion. This was continued proximally and distally until the greater trochanter and abductors were visualized. The abductors were split at the anterior two-thirds. The Hohmann's were placed.   The lateral capsule was incised, and the capsule was dissected off of the proximal femur. This dissection was continued as the femur was externally rotated. The fractured femoral neck and femoral head was identified. The neck was cut in situ using a femoral neck sizing guide. At this point, the femoral head was identified and was removed with a corkscrew drill. All remaining fragments were removed. A  was used to open the femoral canal.  At this point, a lateralizer was passed followed by reamers up to a size 4. At this point, the canal was broached also up to a size 4 in sequential fashion. The hip was trialed with a size 4 standard and a +5 43 femoral head. There was found to be minimal shuck and good stability with no dislocation. The hip was then dislocated, and the trials were removed. The canal was prepped, irrigated, and the implant was placed in the canal until it was found to be firmly sitting in the appropriate position. The bipolar head was then placed, and a Lund taper was undertaken using a mallet and head pusher. Once the femoral head was found to be in appropriate position and tightness, the hip was reduced. Again, it was tested, and there was minimal shuck, and there was good stability on external and internal rotation. The wound was copiously irrigated, and the capsule was closed with #5 Ethibond. This was followed by closure of the abductors also with #5 Ethibond. The vastus lateralis and abductor muscles were closed with 0 Vicryl. At this point, the IT band was also closed with 0 Vicryl. Marcaine 0.5% 20 mL was injected into the joint as well as the subcutaneous tissues, and the subcutaneous tissues were closed with 2-0 Vicryl in interrupted fashion followed by edmundo. DO TINA [id number] 
D:  03/01/2020 16:13 
T:  03/02/2020 2:36 JOB #:  [id number]

## 2020-03-02 NOTE — PROGRESS NOTES
[facility] [address] Progress Note Patient: Ba Sprague               Sex: female          DOA: 2/29/2020 YOB: 1922      Age:  80 y.o.        LOS:  LOS: 2 days S/P  Procedure(s): HIP HEMIARTHROPLASTY Subjective: No complaints Tolerating diet Voiding q shift. Pt pleasantly confused, no reports of pain today. PT in room to work with her. All questions from family answered. Denies cp, sob, abd pain Objective:  
  
Blood pressure 168/62, pulse 91, temperature 98.9 °F (37.2 °C), resp. rate 20, height 4' 11\" (1.499 m), weight 141 lb (64 kg), SpO2 97 %, not currently breastfeeding. Well developed, Well Nourished alert, cooperative, no distress, confused Incision clean, dry, no drainage, dressing in place Mild swelling and tenderness noted in right lower extremity (hip) Sensory is intact Motor is intact 
nv intact 2+distal pulses Neg calf tenderness Neg for facial asymmetry Labs: 
CBC 
@ 
CBC:  
Lab Results Component Value Date/Time WBC 16.3 (H) 03/01/2020 12:56 AM  
 RBC 3.92 (L) 03/01/2020 12:56 AM  
 HGB 10.8 (L) 03/01/2020 12:56 AM  
 HCT 34.3 (L) 03/01/2020 12:56 AM  
 PLATELET 830 35/07/5422 12:56 AM  
@ Coagulation Lab Results Component Value Date INR 1.1 02/29/2020 APTT 29.1 02/29/2020 Basic Metabolic Profile Lab Results Component Value Date  03/01/2020 CO2 16 (L) 03/01/2020 BUN 40 (H) 03/01/2020 Medications Reviewed Assessment/Plan Active Problems: Hyperkalemia (2/29/2020) Chronic kidney disease (2/29/2020) Elevated troponin (2/29/2020) Closed right hip fracture, initial encounter ([facility] [address] (2/29/2020) Procedure(s): HIP HEMIARTHROPLASTY :  
 
1. PT and/or OT Consults: YES continue PT/OT: oob to chair, gentle rom, WBAT RLE, posterior hip precautions 2. Incision Care:  Routine Incision Care and Dressing Changes as necessary: dressing changes POD#2 and as needed 3. Pain control 4. DVT Prophylaxis - SCD in place, Heparin 5000 units DISCHARGE PLANNING  Intervention : Will likely need [facility] (SNF) placement. Will wait for PT evaluation and recommendation. PAUL Garcia [phone] [phone]

## 2020-03-02 NOTE — PROGRESS NOTES
[facility] Patient: Niraj Bryant Age: 80 y.o. : 1922 MR#: [id number] SSN: [id number] Date/Time: 3/2/2020 Subjective:  
 
Patient sitting in bed in NAD, awake, confused. Caregiver at bedside Assessment/Plan: - R Hip fracture 
-s/p Mechanical Fall 
- elevated trop - Chronic diastolic chf 
- pulmonary HTN 
- dementia - TEODORO on top of CKD 4 
-H/o HTN 
 
 
PLAN As per Ortho PT, OT Cardio eval, hold lasix Nephrology following , iv fluids Monitor labs 
dvt prophylaxis D/w dipti quintana over phone D/w  López Dickson Dispo- ?snf Case discussed with:  []Patient  []Family  []Nursing  []Case Management DVT Prophylaxis:  []Lovenox  []Hep SQ  []SCDs  []Coumadin   []On Heparin gtt Objective:  
VS:  
Visit Vitals /58 (BP 1 Location: Left arm, BP Patient Position: At rest) Pulse 95 Temp 97 °F (36.1 °C) Resp 18 Ht 4' 11\" (1.499 m) Wt 64 kg (141 lb) SpO2 94% Breastfeeding No  
BMI 28.48 kg/m² Tmax/24hrs: Temp (24hrs), Av.1 °F (36.7 °C), Min:97 °F (36.1 °C), Max:98.9 °F (37.2 °C) Input/Output:  
 
Intake/Output Summary (Last 24 hours) at 3/2/2020 1305 Last data filed at 3/2/2020 1221 Gross per 24 hour Intake 1411.67 ml Output 600 ml Net 811.67 ml General:  Awake, pleasantly demented Cardiovascular:  S1S2+, RRR Pulmonary:  CTA b/l GI:  Soft, BS+, NT, ND Extremities:  trace edema Labs:   
Recent Results (from the past 24 hour(s)) RENAL FUNCTION PANEL Collection Time: 20  7:20 PM  
Result Value Ref Range Sodium 145 136 - 145 mmol/L Potassium 5.1 3.5 - 5.5 mmol/L Chloride 118 (H) 100 - 111 mmol/L  
 CO2 16 (L) 21 - 32 mmol/L Anion gap 11 3.0 - 18 mmol/L Glucose 116 (H) 74 - 99 mg/dL BUN 40 (H) 7.0 - 18 MG/DL Creatinine 2.81 (H) 0.6 - 1.3 MG/DL  
 BUN/Creatinine ratio 14 12 - 20 GFR est AA 19 (L) >60 ml/min/1.73m2 GFR est non-AA 16 (L) >60 ml/min/1.73m2 Calcium 8.4 (L) 8.5 - 10.1 MG/DL Phosphorus 5.5 (H) 2.5 - 4.9 MG/DL Albumin 2.8 (L) 3.4 - 5.0 g/dL SODIUM, UR, RANDOM Collection Time: 03/02/20  2:00 AM  
Result Value Ref Range Sodium,urine random 26 20 - 110 MMOL/L  
CREATININE, UR, RANDOM Collection Time: 03/02/20  2:00 AM  
Result Value Ref Range Creatinine, urine 186.00 (H) 30 - 125 mg/dL RENAL FUNCTION PANEL Collection Time: 03/02/20  9:50 AM  
Result Value Ref Range Sodium 146 (H) 136 - 145 mmol/L Potassium 4.7 3.5 - 5.5 mmol/L Chloride 118 (H) 100 - 111 mmol/L  
 CO2 19 (L) 21 - 32 mmol/L Anion gap 9 3.0 - 18 mmol/L Glucose 138 (H) 74 - 99 mg/dL BUN 45 (H) 7.0 - 18 MG/DL Creatinine 2.85 (H) 0.6 - 1.3 MG/DL  
 BUN/Creatinine ratio 16 12 - 20 GFR est AA 19 (L) >60 ml/min/1.73m2 GFR est non-AA 15 (L) >60 ml/min/1.73m2 Calcium 8.4 (L) 8.5 - 10.1 MG/DL Phosphorus 4.3 2.5 - 4.9 MG/DL Albumin 2.6 (L) 3.4 - 5.0 g/dL CBC WITH AUTOMATED DIFF Collection Time: 03/02/20  9:50 AM  
Result Value Ref Range WBC 8.8 4.6 - 13.2 K/uL  
 RBC 3.43 (L) 4.20 - 5.30 M/uL HGB 9.3 (L) 12.0 - 16.0 g/dL HCT 29.9 (L) 35.0 - 45.0 % MCV 87.2 74.0 - 97.0 FL  
 MCH 27.1 24.0 - 34.0 PG  
 MCHC 31.1 31.0 - 37.0 g/dL  
 RDW 15.6 (H) 11.6 - 14.5 % PLATELET 499 349 - 912 K/uL MPV 10.7 9.2 - 11.8 FL  
 NEUTROPHILS 84 (H) 40 - 73 % LYMPHOCYTES 8 (L) 21 - 52 % MONOCYTES 8 3 - 10 % EOSINOPHILS 0 0 - 5 % BASOPHILS 0 0 - 2 %  
 ABS. NEUTROPHILS 7.5 1.8 - 8.0 K/UL  
 ABS. LYMPHOCYTES 0.7 (L) 0.9 - 3.6 K/UL  
 ABS. MONOCYTES 0.7 0.05 - 1.2 K/UL  
 ABS. EOSINOPHILS 0.0 0.0 - 0.4 K/UL  
 ABS. BASOPHILS 0.0 0.0 - 0.1 K/UL  
 DF AUTOMATED Additional Data Reviewed:   
 
Signed By: Luciano Damon MD   
 March 2, 2020

## 2020-03-02 NOTE — ROUTINE PROCESS
End of Shift Note Bedside and verbal shift change report given to Atul Yu RN (On coming nurse) by Jewel Payton RN (Off going nurse). Report included the following information:  
   --Procedure Summary 
   --MAR, 
   --Recent Results --Med Rec Status SBAR Recommendations: Confused Issues for Provider to address Placement Activity This Shift 
 
 [] Bed Rest Order 
 [] Refused 
 [] Dangled  
 [] TDWB Ambulating: 
   [] Bathroom [] BSC [] Room/Hallway Up in Chair for meals []Yes [x] No  
Voiding       [] Yes  [] No 
Pham          [x] Yes  [] No 
Incontinent [] Yes  [] No 
 
DUE TO VOID POUR        [] Yes [] No 
Purewick    [] Yes [] No 
New Onset [] Yes [] No Straight Cath   []Yes  [] No 
Condom Cath  [] Yes [] No 
MD Called      [] Yes  [] No  
Blood Sugars Managed []Yes [x] No   
Bowels Moved [] Yes [x] No 
 
Incontinent     [] Yes [] No Passed Gas []Yes [x] No 
 
New Onset  []Yes [] No 
  
 
 MD Called []Yes  [] No 
  
CHG Bath Done Before Surgery After Surgery  
  
[] Yes  [x] No 
[] Yes  [x] No   
  
Drain Removed [] Yes  [] No [x] N/A Dressing Changed [] Yes   [x] No [] N/A Nausea/Vomiting [] Yes   [x] No    
Ice Packs Changed [x] Yes   [] No  [] N/A Incentive Spirometer  [] Yes  [x] No     
SCD Pumps On Ankle Pumping  [x] Yes   [] No  
  
[] Yes   [x] No    
  
Telemetry Monitoring [x] Yes   [] No   Rhythm ST

## 2020-03-02 NOTE — PROGRESS NOTES
Problem: Mobility Impaired (Adult and Pediatric) Goal: *Acute Goals and Plan of Care (Insert Text) Description Physical Therapy Goals Initiated 3/2/2020 and to be accomplished within 7 day(s) 1. Patient will move from supine to sit and sit to supine , scoot up and down and roll side to side in bed with minimal assistance/contact guard assist.    
2.  Patient will transfer from bed to chair and chair to bed with minimal assistance/contact guard assist using the least restrictive device. 3.  Patient will perform sit to stand with minimal assistance/contact guard assist. 
4.  Patient will ambulate with minimal assistance/contact guard assist for 25 feet with the least restrictive device. 5.  Assess stairs (4) as appropriate Prior Level of Function:  
Patient was minimal assistance/contact guard assist for all mobility including gait using rolling walker. Patient lives with her grandson in a single story home with 4 JOAQU[address] B HR. Patient's has a caregiver for 13 hours during the day who was able to describe patient's PLOF. Outcome: Progressing Towards Goal 
 
PHYSICAL THERAPY EVALUATION Patient: Yaritza Hill (27 y.o. female) Date: 3/2/2020 Primary Diagnosis: Closed right hip fracture, initial encounter ([facility] [address] Jonel Boone Hyperkalemia [E87.5] Chronic kidney disease [N18.9] Elevated troponin [R79.89] Procedure(s) (LRB): HIP HEMIARTHROPLASTY (Right) 1 Day Post-Op Precautions:   Fall, WBAT(RLE, michelle-arthroplasty) Total hip precautions ASSESSMENT : 
PT orders received and patient cleared by nursing to participate with therapy. Patient is a 80 y.o. female admitted to the hospital due to s/p R hip hemiarthroplasty POD#1 to correct fracture resulting from a fall. Patient consents to PT evaluation and treatment. Evaluation completed as cotreat with OT to maximize patient and therapist safety and participation. Educated pt/caregiver on total hip precautions. Patient received semi-reclined in bed, caregiver at bedside. Caregiver indicates that patient is pleasantly confused today. Patient found to perseverate on folding linens throughout evaluation, which caregiver indicates is typical. Unable to formally assess patient's strength, AROM, and sensation, but patient appears to have deficits yet remains functional. Patient performs supine to sit min/mod A x2. Patient then attempting to shift forward toward EOB, Carly. Patient impulsive and with decreased command following throughout mobility. Patient sit<>stand on 3 attempts with min-mod A x2. Second attempt modA x2 with need to return to sitting 2/2 fatigue and pain. Patient ambulates 5ft x2 mod A x2 for stability, balance, and negotiation of RW. Patient ambulates with altered center of gravity, antalgic gait, decreased step length B, additional time to weight shift with tactile cuing, and hesitancy to WB on R LE. Patient returned to bed, sit to supine max A x2. Patient education regarding role of PT in the hospital performance of supine/seated therapeutic exercises, and OOB mobility. Session ended with patient supine in bed, wedge in place, caregiver at bedside, all needs in reach. Patient will benefit from skilled intervention to address the above impairments. Patient's rehabilitation potential is considered to be Fair Factors which may influence rehabilitation potential include:  
[]         None noted 
[x]         Mental ability/status [x]         Medical condition 
[]         Home/family situation and support systems 
[x]         Safety awareness [x]         Pain tolerance/management 
[]         Other: PLAN : 
Recommendations and Planned Interventions:  
[x]           Bed Mobility Training             [x]    Neuromuscular Re-Education 
[x]           Transfer Training                   []    Orthotic/Prosthetic Training 
[x]           Gait Training                          [x]    Modalities [x]           Therapeutic Exercises           [x]    Edema Management/Control 
[x]           Therapeutic Activities            [x]    Family Training/Education 
[x]           Patient Education 
[]           Other (comment): Frequency/Duration: Patient will be followed by physical therapy 1-2 times per day/4-7 days per week to address goals. Discharge Recommendations: Chris Lara Further Equipment Recommendations for Discharge: N/A  
 
SUBJECTIVE:  
Patient stated I guess.  (in response to being told she did well) OBJECTIVE DATA SUMMARY:  
 
Past Medical History:  
Diagnosis Date  Anemia NEC  Anxiety  Cataract   
 bilat  Colon cancer ([facility] [address] 1999  CRI (chronic renal insufficiency) 4/07  CVA (cerebral infarction) 4/01  
 right facial droop  CVA (cerebral infarction) 2/11  
 left post corona radiata  Dementia ([facility] [address]  Diverticulosis  Edema  GERD (gastroesophageal reflux disease)  Hypercholesterolemia  Hypertension  Hypomagnesemia 2/11  Lipoma of neck   
 right  Orthostatic hypotension  RBBB (right bundle branch block)  Syncope 6/08  TIA (transient ischemic attack) mid 1990's Past Surgical History:  
Procedure Laterality Date  ENDOSCOPY, COLON, DIAGNOSTIC  2006  HX BREAST BIOPSY  HX CATARACT REMOVAL  10/02  
 left  HX CATARACT REMOVAL  6/03  
 right  HX COLECTOMY  1999  
 right hemicolectomy  HX HYSTERECTOMY  HX LIPOMA RESECTION Barriers to Learning/Limitations: yes;  cognitive, sensory deficits-vision/hearing/speech, and altered mental status (i.e.Sedation, Confusion) Compensate with: Visual Cues, Verbal Cues, and Tactile Cues Home Situation: 
Home Situation Home Environment: Private residence # Steps to Enter: 4 Rails to Enter: Yes One/Two Story Residence: One story Living Alone: No 
Support Systems: Family member(s) Patient Expects to be Discharged to[de-identified] Unknown Current DME Used/Available at Home: Walker, Raised toilet seat, Grab bars, Wheelchair Tub or Shower Type: (sink bath) Critical Behavior: 
Neurologic State: Alert;Confused Orientation Level: Disoriented X4 Cognition: Decreased command following;Decreased attention/concentration; Impaired decision making;Poor safety awareness Safety/Judgement: Fall prevention;Decreased insight into deficits Psychosocial 
Patient Behaviors: Cooperative; Altered mental status Family  Behaviors: Calm; Cooperative;Supportive Skin Condition/Temp: Warm;Dry Family  Behaviors: Calm; Cooperative;Supportive Skin Integrity: Incision (comment)(right hip) Skin Integumentary Skin Color: Appropriate for ethnicity Skin Condition/Temp: Warm;Dry Skin Integrity: Incision (comment)(right hip) Turgor: Epidermis thin w/ loss of subcut tissue Hair Growth: Present Varicosities: Absent B LE Strength:   
Strength: Generally decreased, functional             
B LE Tone & Sensation:  
Tone: Normal         
Sensation: Intact(unable to formally test) B LE Range Of Motion: 
AROM: Generally decreased, functional       
        
 
Functional Mobility: 
Bed Mobility: 
Supine to Sit: Minimum assistance; Moderate assistance;Assist x2(verbal and tactile cues needed) Sit to Supine: Maximum assistance;Assist x2 Scooting: Minimum assistance;Assist x2(towards EOB) Transfers: 
Sit to Stand: Moderate assistance;Maximum assistance;Assist x2(attempt x3; more assistance needed from chair 2/2 fatigue) Stand to Sit: Moderate assistance;Assist x2 Balance:  
Sitting: Impaired; Without support Sitting - Static: Fair (occasional) Sitting - Dynamic: Fair (occasional); Occassional 
Standing: Impaired; With support Standing - Static: Fair Standing - Dynamic : Fair;Occasional 
 
Ambulation/Gait Training: 
Distance (ft): 5 Feet (ft)(x2) 
Assistive Device: Walker, rolling Ambulation - Level of Assistance:  Moderate assistance;Assist x2 
 Gait Abnormalities: Antalgic;Decreased step clearance; Step to gait Right Side Weight Bearing: As tolerated Base of Support: Shift to left;Center of gravity altered Stance: Left increased;Weight shift Speed/Saige: Slow Step Length: Left shortened;Right shortened Therapeutic Exercises:  
Reviewed and performed ankle pumps to increase blood flow and circulation. Pain: 
Pain level pre-treatment: yes- min (unable to verbalize) Pain level post-treatment: yes-mod/max (unable to verbalize) Pain Intervention(s) : Medication (see MAR); Rest, Repositioning Response to intervention: Nurse notified, See doc flow Activity Tolerance:  
Fair Please refer to the flowsheet for vital signs taken during this treatment. After treatment:  
[]         Patient left in no apparent distress sitting up in chair 
[x]         Patient left in no apparent distress in bed 
[x]         Call bell left within reach [x]   Personal items in reach [x]         Nursing notified Mukesh Victor [x]         Caregiver present 
[]         Bed/chair alarm activated 
[x]         SCDs applied COMMUNICATION/EDUCATION:  
[x]         Role of Physical Therapy in the acute care setting. [x]         Fall prevention education was provided and the patient/caregiver indicated understanding. [x]         Patient/family have participated as able in goal setting and plan of care. [x]         Patient/family agree to work toward stated goals and plan of care. []         Patient understands intent and goals of therapy, but is neutral about his/her participation. []         Patient is unable to participate in goal setting/plan of care: ongoing with therapy staff. [x]         Out of bed with nursing assistance 3-5 times a day. []         Other: Thank you for this referral. 
Radha Adames DPT Time Calculation: 63 mins Eval Complexity: History: HIGH Complexity :3+ comorbidities / personal factors will impact the outcome/ POC Exam:HIGH Complexity : 4+ Standardized tests and measures addressing body structure, function, activity limitation and / or participation in recreation  Presentation: MEDIUM Complexity : Evolving with changing characteristics  Clinical Decision Making:Medium Complexity    Overall Complexity:MEDIUM A student participated in this treatment session. Per CMS Medicare statements and guidelines I certify that the following was true: 1. I was present and directly observed the entire session. 2. I made all skilled judgments and clinical decisions regarding care. 3. I am the practitioner responsible for assessment, treatment, and documentation. Thank you, Betty Villatoro, PT, DPT

## 2020-03-02 NOTE — PROGRESS NOTES
[facility]  -  Progress Note Patient: Rajiv Duran MRN: [id number]  SSN: [id number] YOB: 1922  Age: 80 y.o. Sex: female Admit Date: 2/29/2020 Assessment:  
 
Hospital Problems  Date Reviewed: 12/19/2019 Codes Class Noted POA Hyperkalemia ICD-10-CM: E87.5 ICD-9-CM: 276.7  2/29/2020 Unknown Chronic kidney disease ICD-10-CM: N18.9 ICD-9-CM: 585.9  2/29/2020 Unknown Elevated troponin ICD-10-CM: R79.89 ICD-9-CM: 790.6  2/29/2020 Unknown Closed right hip fracture, initial encounter ([facility] ICD-10-CM: S72.001A ICD-9-CM: 820.8  2/29/2020 Unknown  
   
  
 
-s/p mechanical fall 2/29/20 with right hip hemiarthroplasty 3/1/20 by Dr. Matt Arango 
-Indeterminate troponin, not consistent with primary ACS. Seen January 2020 in setting of UTI with conservative management at that time  
-Presumed CAD history 
-Chronic Plavix/ASA use 
-Chronic RBBB 
-Chronic diastolic heart failure on lasix 20 mg as OP. Echo 8/2019 EF 55%, moderate asymmetric LVH 
-h/o moderate pulmonary HTN at 54 mmHg by echo 8/2019 
-h/o HTN 
-Remote stroke 
-h/o Alzheimers dementia 
-HLD on statin 
-CKD stage III 
-DNR status 
  
Primary cardiologist Dr. Meliza Granger, and recently seen by Dr. Rhea Melendez also Plan:  
 
Independently seen and evaluated. Agree with below. She is stable from hemodynamic standpoint, and cardiac standpoint. Will be available as needed. No new cardiac recommendations at this time. Would resume previous cardiac medications when tolerated. Subjective:  
 
Stable and doing well post op. Echo today with results pending Continue with present course and meds More recommendations after echo complete. Objective:  
  
Patient Vitals for the past 8 hrs: 
 Temp Pulse Resp BP SpO2  
03/02/20 0559 98.9 °F (37.2 °C) 91 20 168/62 97 % 03/02/20 0202 98.8 °F (37.1 °C) 82 20 142/59 92 % Patient Vitals for the past 96 hrs: 
 Weight 03/02/20 0151 64 kg (141 lb)  
02/29/20 1045 59.6 kg (131 lb 8 oz) Intake/Output Summary (Last 24 hours) at 3/2/2020 5751 Last data filed at 3/2/2020 5398 Gross per 24 hour Intake 1421.67 ml Output 600 ml Net 821.67 ml Physical Exam: 
General:  cooperative, no distress, appears stated age, pleasantly demented Neck:  no JVD Lungs:  clear to auscultation bilaterally Heart:  regular rate and rhythm, S1, S2 normal, no murmur, click, rub or gallop Extremities:  extremities normal, atraumatic, no cyanosis or edema Data Review:  
 
Labs: Results:  
   
Chemistry Recent Labs 03/01/20 
1920 03/01/20 
0945 03/01/20 0056 02/29/20 
1838 * 122* 117* 108*  143 142 141  
K 5.1 5.4 6.7* 6.1*  
* 117* 117* 115* CO2 16* 19* 20* 21 BUN 40* 37* 32* 29* CREA 2.81* 2.70* 2.29* 1.84* CA 8.4* 8.8 8.9 8.8 PHOS 5.5* 5.7*  --   --   
AGAP 11 7 5 5 BUCR 14 14 14 16 AP  --   --   --  86  
TP  --   --   --  7.5 ALB 2.8*  --   --  3.0*  
GLOB  --   --   --  4.5* AGRAT  --   --   --  0.7* CBC w/Diff Recent Labs 03/01/20 
0056 02/29/20 
1158 WBC 16.3* 14.1*  
RBC 3.92* 4.01* HGB 10.8* 10.9* HCT 34.3* 34.6*  257 GRANS  --  89* LYMPH  --  6*  
EOS  --  0 Cardiac Enzymes No results found for: CPK, CK, CKMMB, CKMB, RCK3, CKMBT, CKNDX, CKND1, FELTON, TROPT, TROIQ, SHANIKA, TROPT, TNIPOC, BNP, BNPP Coagulation Recent Labs  
  02/29/20 
1158 PTP 13.8 INR 1.1 APTT 29.1 Lipid Panel Lab Results Component Value Date/Time Cholesterol, total 123 06/06/2019 05:38 AM  
 HDL Cholesterol 62 (H) 06/06/2019 05:38 AM  
 LDL, calculated 44.8 06/06/2019 05:38 AM  
 VLDL, calculated 16.2 06/06/2019 05:38 AM  
 Triglyceride 81 06/06/2019 05:38 AM  
 CHOL/HDL Ratio 2.0 06/06/2019 05:38 AM  
  
BNP No results found for: BNP, BNPP, XBNPT Liver Enzymes Recent Labs 03/01/20 
1920 02/29/20 
1838 TP  --  7.5 ALB 2.8* 3.0*  
AP  --  86  
 SGOT  --  29 Digoxin Thyroid Studies Lab Results Component Value Date/Time  TSH 4.39 (H) 01/06/2020 05:20 AM

## 2020-03-02 NOTE — PROGRESS NOTES
Problem: Pressure Injury - Risk of 
Goal: *Prevention of pressure injury Description Document Shay Scale and appropriate interventions in the flowsheet. Outcome: Progressing Towards Goal 
Note: Pressure Injury Interventions: 
Sensory Interventions: Discuss PT/OT consult with provider, Keep linens dry and wrinkle-free Moisture Interventions: Internal/External urinary devices, Minimize layers Activity Interventions: Increase time out of bed, PT/OT evaluation Mobility Interventions: PT/OT evaluation Nutrition Interventions: Offer support with meals,snacks and hydration Friction and Shear Interventions: Minimize layers Problem: Patient Education: Go to Patient Education Activity Goal: Patient/Family Education Outcome: Progressing Towards Goal 
  
Problem: Infection - Risk of, Urinary Catheter-Associated Urinary Tract Infection Goal: *Absence of infection signs and symptoms Outcome: Progressing Towards Goal 
  
Problem: Patient Education: Go to Patient Education Activity Goal: Patient/Family Education Outcome: Progressing Towards Goal 
  
Problem: General Medical Care Plan Goal: *Vital signs within specified parameters Outcome: Progressing Towards Goal 
Goal: *Labs within defined limits Outcome: Progressing Towards Goal 
Goal: *Absence of infection signs and symptoms Outcome: Progressing Towards Goal 
Goal: *Optimal pain control at patient's stated goal 
Outcome: Progressing Towards Goal 
Goal: *Skin integrity maintained Outcome: Progressing Towards Goal 
Goal: *Fluid volume balance Outcome: Progressing Towards Goal 
Goal: *Optimize nutritional status Outcome: Progressing Towards Goal 
Goal: *Anxiety reduced or absent Outcome: Progressing Towards Goal 
Goal: *Progressive mobility and function (eg: ADL's) Outcome: Progressing Towards Goal 
  
Problem: Falls - Risk of 
Goal: *Absence of Falls Description Document Alhaji Kumar Fall Risk and appropriate interventions in the flowsheet. Outcome: Progressing Towards Goal 
Note: Fall Risk Interventions: 
Mobility Interventions: Bed/chair exit alarm, Patient to call before getting OOB Mentation Interventions: Bed/chair exit alarm, Door open when patient unattended, Reorient patient Medication Interventions: Patient to call before getting OOB Elimination Interventions: Call light in reach, Patient to call for help with toileting needs History of Falls Interventions: Investigate reason for fall, Door open when patient unattended Problem: Patient Education: Go to Patient Education Activity Goal: Patient/Family Education Outcome: Progressing Towards Goal 
  
Problem: Patient Education: Go to Patient Education Activity Goal: Patient/Family Education Outcome: Progressing Towards Goal 
  
Problem: Hip Fracture:Day of Admission Pre-op Goal: Activity/Safety Outcome: Progressing Towards Goal 
Goal: Consults, if ordered Outcome: Progressing Towards Goal 
Goal: Diagnostic Test/Procedures Outcome: Progressing Towards Goal 
Goal: Nutrition/Diet Outcome: Progressing Towards Goal 
Goal: Medications Outcome: Progressing Towards Goal 
Goal: Respiratory Outcome: Progressing Towards Goal 
Goal: Treatments/Interventions/Procedures Outcome: Progressing Towards Goal 
Goal: Psychosocial 
Outcome: Progressing Towards Goal 
Goal: *Labs/Tests Within Defined Limits in Preparation for Surgery Outcome: Progressing Towards Goal 
Goal: *Optimal pain control at patient's stated goal 
Outcome: Progressing Towards Goal 
Goal: *Hemodynamically stable Outcome: Progressing Towards Goal 
  
Problem: Hip Fracture: Day of Surgery Post-op Care Goal: Off Pathway (Use only if patient is Off Pathway) Outcome: Progressing Towards Goal 
Goal: Activity/Safety Outcome: Progressing Towards Goal 
Goal: Consults, if ordered Outcome: Progressing Towards Goal 
Goal: Diagnostic Test/Procedures Outcome: Progressing Towards Goal 
Goal: Nutrition/Diet Outcome: Progressing Towards Goal 
Goal: Medications Outcome: Progressing Towards Goal 
Goal: Respiratory Outcome: Progressing Towards Goal 
Goal: Treatments/Interventions/Procedures Outcome: Progressing Towards Goal 
Goal: Psychosocial 
Outcome: Progressing Towards Goal 
Goal: *Absence of skin breakdown Outcome: Progressing Towards Goal 
Goal: *Optimal pain control at patient's stated goal 
Outcome: Progressing Towards Goal 
Goal: *Hemodynamically stable Outcome: Progressing Towards Goal 
  
Problem: Hip Fracture: Post-Op Day 1 Goal: Off Pathway (Use only if patient is Off Pathway) Outcome: Progressing Towards Goal 
Goal: Activity/Safety Outcome: Progressing Towards Goal 
Goal: Diagnostic Test/Procedures Outcome: Progressing Towards Goal 
Goal: Nutrition/Diet Outcome: Progressing Towards Goal 
Goal: Medications Outcome: Progressing Towards Goal 
Goal: Respiratory Outcome: Progressing Towards Goal 
Goal: Treatments/Interventions/Procedures Outcome: Progressing Towards Goal 
Goal: Psychosocial 
Outcome: Progressing Towards Goal 
Goal: Discharge Planning Outcome: Progressing Towards Goal 
Goal: *Demonstrates progressive activity Outcome: Progressing Towards Goal 
Goal: *Optimal pain control at patient's stated goal 
Outcome: Progressing Towards Goal 
Goal: *Hemodynamically stable Outcome: Progressing Towards Goal 
Goal: *Discharge plan identified Outcome: Progressing Towards Goal 
Goal: *Absence of skin breakdown Outcome: Progressing Towards Goal 
  
Problem: Hip Fracture: Post-Op Day 2 Goal: Off Pathway (Use only if patient is Off Pathway) Outcome: Progressing Towards Goal 
Goal: Activity/Safety Outcome: Progressing Towards Goal 
Goal: Diagnostic Test/Procedures Outcome: Progressing Towards Goal 
Goal: Nutrition/Diet Outcome: Progressing Towards Goal 
Goal: Medications Outcome: Progressing Towards Goal 
Goal: Respiratory Outcome: Progressing Towards Goal 
Goal: Treatments/Interventions/Procedures Outcome: Progressing Towards Goal 
Goal: Psychosocial 
Outcome: Progressing Towards Goal 
Goal: Discharge Planning Outcome: Progressing Towards Goal 
Goal: *Optimal pain control at patient's stated goal 
Outcome: Progressing Towards Goal 
Goal: *Hemodynamically stable Outcome: Progressing Towards Goal 
Goal: *Adequate oxygenation Outcome: Progressing Towards Goal 
Goal: *Tolerates increased activity Outcome: Progressing Towards Goal 
Goal: *Tolerates nutrition therapy Outcome: Progressing Towards Goal 
Goal: *Absence of skin breakdown Outcome: Progressing Towards Goal 
  
Problem: Hip Fracture: Post-Op Day 3 Goal: Off Pathway (Use only if patient is Off Pathway) Outcome: Progressing Towards Goal 
Goal: Activity/Safety Outcome: Progressing Towards Goal 
Goal: Diagnostic Test/Procedures Outcome: Progressing Towards Goal 
Goal: Nutrition/Diet Outcome: Progressing Towards Goal 
Goal: Medications Outcome: Progressing Towards Goal 
Goal: Respiratory Outcome: Progressing Towards Goal 
Goal: Treatments/Interventions/Procedures Outcome: Progressing Towards Goal 
Goal: Psychosocial 
Outcome: Progressing Towards Goal 
Goal: Discharge Planning Outcome: Progressing Towards Goal 
Goal: *Met physical therapy criteria for discharge to next level of care Outcome: Progressing Towards Goal 
Goal: *Optimal pain control at patient's stated goal 
Outcome: Progressing Towards Goal 
Goal: *Hemodynamically stable Outcome: Progressing Towards Goal 
Goal: *Tolerating diet Outcome: Progressing Towards Goal 
Goal: *Active bowel function Outcome: Progressing Towards Goal 
Goal: *Adequate urinary output Outcome: Progressing Towards Goal 
Goal: *Absence of skin breakdown Outcome: Progressing Towards Goal 
Goal: *Patient verbalizes understanding of discharge instructions Outcome: Progressing Towards Goal 
  
Problem: Hip Fracture: Post-Op Day 4 
 Goal: Off Pathway (Use only if patient is Off Pathway) Outcome: Progressing Towards Goal 
Goal: Activity/Safety Outcome: Progressing Towards Goal 
Goal: Diagnostic Test/Procedures Outcome: Progressing Towards Goal 
Goal: Nutrition/Diet Outcome: Progressing Towards Goal 
Goal: Medications Outcome: Progressing Towards Goal 
Goal: Respiratory Outcome: Progressing Towards Goal 
Goal: Treatments/Interventions/Procedures Outcome: Progressing Towards Goal 
Goal: Psychosocial 
Outcome: Progressing Towards Goal 
Goal: Discharge Planning Outcome: Progressing Towards Goal 
Goal: *Met physical therapy criteria for discharge to next level of care Outcome: Progressing Towards Goal 
Goal: *Optimal pain control at patient's stated goal 
Outcome: Progressing Towards Goal 
Goal: *Hemodynamically stable Outcome: Progressing Towards Goal 
Goal: *Tolerating diet Outcome: Progressing Towards Goal 
Goal: *Active bowel function Outcome: Progressing Towards Goal 
Goal: *Adequate urinary output Outcome: Progressing Towards Goal 
Goal: *Absence of skin breakdown Outcome: Progressing Towards Goal 
Goal: *Patient verbalizes understanding of discharge instructions Outcome: Progressing Towards Goal

## 2020-03-02 NOTE — PROGRESS NOTES
Problem: Self Care Deficits Care Plan (Adult) Goal: *Acute Goals and Plan of Care (Insert Text) Description Occupational Therapy Goals Initiated 3/2/2020 within 7 day(s). 1.  Patient will perform functional activity seated EOB for 2-4 minutes with supervision/set-up and G balance. 2.  Patient will perform bed mobility with supervision/set-up. 3.  Patient will perform self-feeding with supervision/set-up. 4.  Patient will perform toilet transfers with minimal assistance/contact guard assist. 
 
Prior Level of Function: Patient needed assist with self-care and used a standard walker for functional mobility PTA. Patient caregiver (present in room) reports she does all ADLs, however patient able to self-feed \"on good days\" and doff clothing (when agitated). Caregiver present 13 hours/day during the day when patient's grandson is not home. Outcome: Progressing Towards Goal 
  
OCCUPATIONAL THERAPY EVALUATION Patient: Feliciano Cornejo (15 y.o. female) Date: 3/2/2020 Primary Diagnosis: Closed right hip fracture, initial encounter ([facility] [place] Hyperkalemia [E87.5] Chronic kidney disease [N18.9] Elevated troponin [R79.89] Procedure(s) (LRB): HIP HEMIARTHROPLASTY (Right) 1 Day Post-Op Precautions:Fall, WBAT(RLE, michelle-arthroplasty) ASSESSMENT : 
Upon entering the room, the pt was semi-reclined in bed, alert, and agreeable to participate in OT evaluation with caregiver/family member present. Pt was seen with PT to maximize pt safety and participation. PLOF given by caregiver who was very helpful during this evaluation. Patient and caregiver educated on the role of OT, WB status, and precautions, with caregiver demonstrating good understanding. Patient PMH significant for dementia with patient demonstrating decreased attention to task and need for verbal/tactile cues throughout session.  Patient loves to fidget with sheet/lines, recommend caution and decrease distractions for future sessions as caregiver reports she has pulled out IV. Patient Carly - modA x 2 for supine to sit, modA - maxA x 2 for functional transfers using RW and moderate assistance for donning gown. Patient observed with fatigue during session, max assist x2 for sit to supine. Based on the objective data described below, the patient presents with decreased strength, decreased endurance, decreased safety awareness, decreased functional balance, and decreased functional mobility, which impedes pt performance in basic self-care/ADL tasks. Pt would benefit from skilled OT to restore PLOF and maximize function. Patient will benefit from skilled intervention to address the above impairments. Patient's rehabilitation potential is considered to be Good Factors which may influence rehabilitation potential include:  
[]             None noted [x]             Mental ability/status [x]             Medical condition [x]             Home/family situation and support systems [x]             Safety awareness [x]             Pain tolerance/management 
[]             Other: PLAN : 
Recommendations and Planned Interventions:  
[x]               Self Care Training                  [x]      Therapeutic Activities [x]               Functional Mobility Training   []      Cognitive Retraining 
[x]               Therapeutic Exercises           [x]      Endurance Activities [x]               Balance Training                    [x]      Neuromuscular Re-Education []               Visual/Perceptual Training     [x]      Home Safety Training 
[x]               Patient Education                   [x]      Family Training/Education []               Other (comment): Frequency/Duration: Patient will be followed by occupational therapy 1-2 times per day/4-7 days per week to address goals. Discharge Recommendations: Chris Lara Further Equipment Recommendations for Discharge: N/A  
 
SUBJECTIVE:  
 Patient stated Des Del Castillo I have water OBJECTIVE DATA SUMMARY:  
 
Past Medical History:  
Diagnosis Date Anemia NEC Anxiety Cataract   
 bilat Colon cancer ([facility] 1999 CRI (chronic renal insufficiency) 4/07 CVA (cerebral infarction) 4/01  
 right facial droop CVA (cerebral infarction) 2/11  
 left post corona radiata Dementia ([facility] Diverticulosis Edema GERD (gastroesophageal reflux disease) Hypercholesterolemia Hypertension Hypomagnesemia 2/11 Lipoma of neck   
 right Orthostatic hypotension RBBB (right bundle branch block) Syncope 6/08 TIA (transient ischemic attack) mid 1990's Past Surgical History:  
Procedure Laterality Date ENDOSCOPY, COLON, DIAGNOSTIC  2006 HX BREAST BIOPSY HX CATARACT REMOVAL  10/02  
 left HX CATARACT REMOVAL  6/03  
 right HX COLECTOMY  1999  
 right hemicolectomy HX HYSTERECTOMY HX LIPOMA RESECTION Barriers to Learning/Limitations: yes;  cognitive Compensate with: visual, verbal, tactile, kinesthetic cues/model Home Situation:  
Home Situation Home Environment: Private residence # Steps to Enter: 4 Rails to Enter: Yes One/Two Story Residence: One story Living Alone: No 
Support Systems: Family member(s) Patient Expects to be Discharged to[de-identified] Unknown Current DME Used/Available at Home: Walker, Raised toilet seat, Grab bars, Wheelchair Tub or Shower Type: (sink bath) [x]  Right hand dominant   []  Left hand dominant Cognitive/Behavioral Status: 
Neurologic State: Alert;Confused Orientation Level: Disoriented X4 Cognition: Decreased command following;Decreased attention/concentration; Impaired decision making;Poor safety awareness Safety/Judgement: Fall prevention;Decreased insight into deficits Skin: Bruising observed on RUE around old IV site Edema: Swelling in RUE Vision/Perceptual:   
Acuity: (Blind in R eye, can see shadows, pt tracks OT to B sides) Corrective Lenses: Glasses Coordination: BUE Fine Motor Skills-Upper: Left Intact; Right Intact Gross Motor Skills-Upper: Left Intact; Right Intact Balance: 
Sitting: Impaired; Without support Sitting - Static: Fair (occasional) Sitting - Dynamic: Fair (occasional); Occassional 
Standing: Impaired; With support Standing - Static: Fair Standing - Dynamic : Fair;Occasional 
 
Strength: BUE Strength: Generally decreased, functional 
 
Tone & Sensation: BUE Tone: Normal 
Sensation: Intact(unable to formally test) Range of Motion: BUE 
AROM: Generally decreased, functional 
 
 
Functional Mobility and Transfers for ADLs: 
Bed Mobility: 
Supine to Sit: Minimum assistance; Moderate assistance;Assist x2(verbal and tactile cues needed) Sit to Supine: Maximum assistance;Assist x2 Scooting: Minimum assistance;Assist x2(towards EOB) Transfers: 
Sit to Stand: Moderate assistance;Maximum assistance;Assist x2(attempt x3; more assistance needed from chair 2/2 fatigue) Stand to Sit: Moderate assistance;Assist x2 Toilet Transfer : Moderate assistance;Assist x2(simulation with recliner) ADL Assessment:  
Feeding: Minimum assistance Oral Facial Hygiene/Grooming: Minimum assistance Bathing: Maximum assistance Upper Body Dressing: Moderate assistance Lower Body Dressing: Total assistance Toileting: Total assistance ADL Intervention: 
Feeding Feeding Assistance: Minimum assistance Drink to Mouth: Minimum assistance(cues for correct use of straw) Upper Body Dressing Assistance Dressing Assistance: Moderate assistance Hospital Gown: Moderate assistance Cognitive Retraining Safety/Judgement: Fall prevention;Decreased insight into deficits Pain: 
Patient stated \"ow\" while performing functional mobility, possible pain however patient (+) dementia, unable to state when asked Pain Intervention(s): Medication (see MAR); Response to intervention:  See doc flow Activity Tolerance: Patient demonstrated fair activity tolerance during OT evaluation. Please refer to the flowsheet for vital signs taken during this treatment. After treatment:  
[] Patient left in no apparent distress sitting up in chair 
[x] Patient left in no apparent distress in bed 
[x] Call bell left within reach [x] Nursing notified 
[x] Caregiver present 
[] Bed alarm activated COMMUNICATION/EDUCATION:  
[x] Role of Occupational Therapy in the acute care setting 
[x] Home safety education was provided and the patient/caregiver indicated understanding. [x] Patient/family have participated as able in goal setting and plan of care. [x] Patient/family agree to work toward stated goals and plan of care. [] Patient understands intent and goals of therapy, but is neutral about his/her participation. [] Patient is unable to participate in goal setting and plan of care. Thank you for this referral. 
Richard Walton, OTR/L Time Calculation: 63 mins Eval Complexity: History: MEDIUM Complexity : Expanded review of history including physical, cognitive and psychosocial  history ; Examination: HIGH Complexity : 5 or more performance deficits relating to physical, cognitive , or psychosocial skils that result in activity limitations and / or participation restrictions; Decision Making:HIGH Complexity : Patient presents with comorbidities that affect occupational performance. Signifigant modification of tasks or assistance (eg, physical or verbal) with assessment (s) is necessary to enable patient to complete evaluation

## 2020-03-02 NOTE — PROGRESS NOTES
Patient is not available to be assessed at this time. No family at bedside. Scanned [facility] Directive is in the chart. [facility] [address] [facility] [employer] [facility] Certified [employer] [facility]  
[phone]

## 2020-03-02 NOTE — PROGRESS NOTES
Writer called Rose Sorto, son and left a voice message. Waiting for return call. LOLA Starks, RN Pager # [phone] Care Manager

## 2020-03-02 NOTE — CONSULTS
Consult Note Consult requested by: Saroj Menchaca MD 
 
ADMIT DATE: 2/29/2020 CONSULT DATE: March 2, 2020 Admission diagnosis: Hip# Reason for Nephrology Consultation: TEODORO on CKD Assessment: #1.nonoliguric TEODORO on chronic kidney disease stage IV( baseline ~ 1.8-2) #2 hyperkalemia , better #3 metabolic acidosis with non-anion gap #4 hypertension #5 right hip fracture presently in surgery #6 elevated troponin's #7 hypernatremia #8 h/o diastolic dysfunction , outpatient diuretic therapy #9 advanced dementia/delirium  
  
Plan: #1 strict I/o , IV fluids with hypotonic bicarb @ 75 cc/hrs #2 hold diuretics #3 avoid NSAID ,nephrotoxins , IV contrast 
#4 continue BP agents , discrepancy regarding clonidine per nursing If patient was not on it at home prefer not being on clonidine , also avoid losartan ,  
prefer BB Like coreg , goal -435 systolic , no need To aggressively control BP in this setting #5 recheck BMP this evening and in AM 
  
Please call with questions, 
  
Gil Solorio MD [facility] Cell [phone] Pager: [phone] HPI:  
Patient is a 25-year-old -American female with history of hypertension, chronic kidney disease stage III/IV, history of dementia, hypertension, dyslipidemia , diastolic dysfunction who presented after a fall. Patient was found on the floor wedged by her bed. She was noted to have a right-sided complete acute fracture through the right femoral neck. She was then admitted for closed right hip fracture repair. She was noted to have hyperkalemia which was treated with Kayexalate, also noted to have elevated troponins. Past Medical History:  
Diagnosis Date  Anemia NEC  Anxiety  Cataract   
 bilat  Colon cancer ([facility] [address] 1999  CRI (chronic renal insufficiency) 4/07  CVA (cerebral infarction) 4/01  
 right facial droop  CVA (cerebral infarction) 2/11  
 left post corona radiata  Dementia ([address]  Diverticulosis  Edema  GERD (gastroesophageal reflux disease)  Hypercholesterolemia  Hypertension  Hypomagnesemia 2/11  Lipoma of neck   
 right  Orthostatic hypotension  RBBB (right bundle branch block)  Syncope 6/08  TIA (transient ischemic attack) mid 1990's Past Surgical History:  
Procedure Laterality Date  ENDOSCOPY, COLON, DIAGNOSTIC  2006  HX BREAST BIOPSY  HX CATARACT REMOVAL  10/02  
 left  HX CATARACT REMOVAL  6/03  
 right  HX COLECTOMY  1999  
 right hemicolectomy  HX HYSTERECTOMY  HX LIPOMA RESECTION Social History Socioeconomic History  Marital status:  Spouse name: Not on file  Number of children: Not on file  Years of education: Not on file  Highest education level: Not on file Occupational History  Not on file Social Needs  Financial resource strain: Not on file  Food insecurity:  
  Worry: Not on file Inability: Not on file  Transportation needs:  
  Medical: Not on file Non-medical: Not on file Tobacco Use  Smoking status: Never Smoker  Smokeless tobacco: Never Used Substance and Sexual Activity  Alcohol use: No  
 Drug use: Not on file  Sexual activity: Never Lifestyle  Physical activity:  
  Days per week: Not on file Minutes per session: Not on file  Stress: Not on file Relationships  Social connections:  
  Talks on phone: Not on file Gets together: Not on file Attends Buddhist service: Not on file Active member of club or organization: Not on file Attends meetings of clubs or organizations: Not on file Relationship status: Not on file  Intimate partner violence:  
  Fear of current or ex partner: Not on file Emotionally abused: Not on file Physically abused: Not on file Forced sexual activity: Not on file Other Topics Concern  Not on file Social History Narrative  Not on file History reviewed. No pertinent family history. Allergies Allergen Reactions  Shellfish Derived Hives and Nausea and Vomiting Home Medications:  
 
Medications Prior to Admission Medication Sig  
 losartan (COZAAR) 100 mg tablet Take 100 mg by mouth daily. Indications: high blood pressure  Omeprazole delayed release (PRILOSEC D/R) 20 mg tablet Take 20 mg by mouth daily. Indications: gastroesophageal reflux disease  furosemide (LASIX) 20 mg tablet Take 1 Tab by mouth daily. Indications: high blood pressure  lactobacillus sp. 50 billion cpu (BIO-K PLUS) 50 billion cell -375 mg cap capsule Take 1 Cap by mouth daily.  guaiFENesin ER (MUCINEX) 600 mg ER tablet Take 1 Tab by mouth every twelve (12) hours. Indications: cough  ergocalciferol (ERGOCALCIFEROL) 50,000 unit capsule TAKE 1 CAPSULE WEEKLY  vitamin e (E GEMS) 1,000 unit capsule Take 1,000 Units by mouth daily.  ferrous sulfate (SLOW FE) 142 mg (45 mg iron) ER tablet Take  by mouth Daily (before breakfast).  folic acid/multivit-min/lutein (CENTRUM SILVER PO) Take 1 Tab by mouth daily.  allopurinol (ZYLOPRIM) 100 mg tablet Take 100 mg by mouth every Monday and Friday.  atorvastatin (LIPITOR) 20 mg tablet Take 20 mg by mouth daily.  loratadine (CLARITIN) 10 mg tablet Take 10 mg by mouth daily as needed for Allergies.  clopidogrel (PLAVIX) 75 mg tab Take 75 mg by mouth daily.  felodipine (PLENDIL SR) 10 mg 24 hr tablet Take 1 Tab by mouth daily.  aspirin delayed-release 81 mg tablet Take 81 mg by mouth daily.  haloperidol (HALDOL) 2 mg/mL oral concentrate Take 1 mL by mouth every six (6) hours as needed (agitation).  cloNIDine HCl (CATAPRES) 0.2 mg tablet Take 1 Tab by mouth two (2) times a day. (Patient taking differently: Take 0.2 mg by mouth two (2) times a day. Indications: 1/2 a tablet in addition to full tablet qod) Current Inpatient Medications:  
 
Current Facility-Administered Medications Medication Dose Route Frequency  sodium bicarbonate (8.4%) 50 mEq in dextrose 5% 1,000 mL infusion   IntraVENous CONTINUOUS  
 sodium chloride (NS) flush 5-40 mL  5-40 mL IntraVENous Q8H  
 sodium chloride (NS) flush 5-40 mL  5-40 mL IntraVENous PRN  
 ceFAZolin (ANCEF) 2g IVPB in 50 mL D5W  2 g IntraVENous Q8H  
 felodipine (PLENDIL SR) 24 hr tablet 10 mg  10 mg Oral DAILY  allopurinoL (ZYLOPRIM) tablet 100 mg  100 mg Oral EVERY MON & FRI  atorvastatin (LIPITOR) tablet 20 mg  20 mg Oral DAILY  loratadine (CLARITIN) tablet 10 mg  10 mg Oral DAILY PRN  
 cloNIDine HCL (CATAPRES) tablet 0.2 mg  0.2 mg Oral BID  [Held by provider] furosemide (LASIX) tablet 20 mg  20 mg Oral DAILY  haloperidol (HALDOL) 2 mg/mL oral solution 2 mg  2 mg Oral Q6H PRN  
 heparin (porcine) injection 5,000 Units  5,000 Units SubCUTAneous Q8H  
 morphine injection 1 mg  1 mg IntraVENous Q3H PRN  
 acetaminophen (TYLENOL) suppository 650 mg  650 mg Rectal Q6H PRN Review of Systems: No fever or chills. No sore throat. No cough or hemoptysis. No shortness of breath or chest pain. No orthopnea or paroxysmal nocturnal dyspnea. Good appetite. No nausea, vomiting, abdominal pain, melena or hematochezia. No constipation or diarrhea. No dysuria, no gross hematuria of voiding difficulties. No ankle swelling, no joint paints. No muscle aches. No skin changes. No dizziness or lightheadedness. No headaches. Physical Assessment:  
 
Vitals:  
 03/02/20 0151 03/02/20 0202 03/02/20 0559 03/02/20 1012 BP:  142/59 168/62 139/58 Pulse:  82 91 95 Resp:  20 20 18 Temp:  98.8 °F (37.1 °C) 98.9 °F (37.2 °C) 97 °F (36.1 °C) SpO2:  92% 97% 94% Weight: 64 kg (141 lb) Height:      
 
Last 3 Recorded Weights in this Encounter 02/29/20 1045 03/02/20 0151 Weight: 59.6 kg (131 lb 8 oz) 64 kg (141 lb) Admission weight: Weight: 59.6 kg (131 lb 8 oz) (02/29/20 1045) Intake/Output Summary (Last 24 hours) at 3/2/2020 1252 Last data filed at 3/2/2020 1221 Gross per 24 hour Intake 1411.67 ml Output 600 ml Net 811.67 ml Awake , confused Patient is in no apparent distress. HEENT: dry mucosa Neck: no cervical lymphadenopathy or thyromegaly. Lungs: good air entry, clear to auscultation bilaterally Cardiovascular system: S1, S2, regular rate and rhythm. Abdomen: soft, non tender, non distended. Extremities: no clubbing, cyanosis or edema. Neurologic: Alert, oriented time three. Data Review: 
 
Labs: Results:  
   
Chemistry Recent Labs 03/02/20 
0950 03/01/20 
1920 03/01/20 
0945  02/29/20 
1838 * 116* 122*   < > 108* * 145 143   < > 141  
K 4.7 5.1 5.4   < > 6.1*  
* 118* 117*   < > 115* CO2 19* 16* 19*   < > 21 BUN 45* 40* 37*   < > 29* CREA 2.85* 2.81* 2.70*   < > 1.84* CA 8.4* 8.4* 8.8   < > 8.8 AGAP 9 11 7   < > 5  
BUCR 16 14 14   < > 16 AP  --   --   --   --  86  
TP  --   --   --   --  7.5 ALB 2.6* 2.8*  --   --  3.0*  
GLOB  --   --   --   --  4.5* AGRAT  --   --   --   --  0.7* PHOS 4.3 5.5* 5.7*   < >  --   
 < > = values in this interval not displayed. CBC w/Diff Recent Labs 03/02/20 
0950 03/01/20 
0056 02/29/20 
1158 WBC 8.8 16.3* 14.1*  
RBC 3.43* 3.92* 4.01* HGB 9.3* 10.8* 10.9* HCT 29.9* 34.3* 34.6*  268 257 GRANS 84*  --  89* LYMPH 8*  --  6*  
EOS 0  --  0 Iron/Ferritin No results for input(s): IRON in the last 72 hours. No lab exists for component: TIBCCALC  
PTH/VIT D No results for input(s): PTH in the last 72 hours. No lab exists for component: VITD Jose Barrett MD 
3/2/2020 
12:52 PM 
 
 
March 2, 2020

## 2020-03-03 NOTE — PROGRESS NOTES
Problem: Mobility Impaired (Adult and Pediatric) Goal: *Acute Goals and Plan of Care (Insert Text) Description Physical Therapy Goals Initiated 3/2/2020 and to be accomplished within 7 day(s) 1. Patient will move from supine to sit and sit to supine , scoot up and down and roll side to side in bed with minimal assistance/contact guard assist.    
2.  Patient will transfer from bed to chair and chair to bed with minimal assistance/contact guard assist using the least restrictive device. 3.  Patient will perform sit to stand with minimal assistance/contact guard assist. 
4.  Patient will ambulate with minimal assistance/contact guard assist for 25 feet with the least restrictive device. 5.  Assess stairs (4) as appropriate Prior Level of Function:  
Patient was minimal assistance/contact guard assist for all mobility including gait using rolling walker. Patient lives with her grandson in a single story home with 4 [address] HR. Patient's has a caregiver for 13 hours during the day who was able to describe patient's PLOF. Outcome: Progressing Towards Goal 
  
PHYSICAL THERAPY TREATMENT Patient: Devin Rodriguez (7 y.o. female) Date: 3/3/2020 Diagnosis: Closed right hip fracture, initial encounter ([facility] [address] Mariam Jones Hyperkalemia [E87.5] Chronic kidney disease [N18.9] Elevated troponin [R79.89] <principal problem not specified> Procedure(s) (LRB): HIP HEMIARTHROPLASTY (Right) 2 Days Post-Op Precautions: Fall, WBAT(RLE, michelle-arthroplasty), total hip precautions ASSESSMENT: 
Patient is cleared by nursing for PT, and patient consents to therapy. Pt has family present in room and pt is awake at this time. Reviewed total hip precautions with pt and family as well as wrote them on the board. Pt is more confused this afternoon compared to yesterday and seems to be in more pain, nursing informed.  Pt required total assistance x2 for all mobility including supine to and from sit and scooting up in bed. Pt has poor sitting balance with a significant posterior lean with maximum assistance for safety. Pt not able to sit upright or get feet fully on the floor with sitting. Pt was attempting to pull on the one therapist in front but still pushing back on the therapist in back. Attempted to perform therex with pt having difficulty following commands but did attempt to perform ankle pumps mostly with toes. Pt ended therapy supine in bed with alarm donned and all needs met. Progression toward goals:   
[]      Improving appropriately and progressing toward goals [x]      Improving slowly and progressing toward goals 
[]      Not making progress toward goals and plan of care will be adjusted PLAN: 
Patient continues to benefit from skilled intervention to address the above impairments. Continue treatment per established plan of care. Discharge Recommendations:  Chris Lara Further Equipment Recommendations for Discharge:  N/A  
 
SUBJECTIVE:  
Patient was nonverbal today. OBJECTIVE DATA SUMMARY:  
Critical Behavior: 
Neurologic State: Confused Orientation Level: Disoriented X4 Cognition: Poor safety awareness Safety/Judgement: Fall prevention, Decreased insight into deficits Functional Mobility Training: 
Bed Mobility: 
  
Supine to Sit: Total assistance;Assist x2 Sit to Supine: Total assistance;Assist x2 Scooting: Total assistance;Assist x2 Balance: 
Sitting: Impaired; With support Sitting - Static: Poor (constant support) Sitting - Dynamic: Poor (constant support) Therapeutic Exercises:  
Reviewed and performed ankle pumps to increase blood flow and circulation. Therex provided throughout session as functional mobility to increase strength and endurance. Pain: 
Pt has moderate to significant pain throughout session but unable to rate, nursing informed. Pain Intervention(s): Medication (see MAR); Rest, Repositioning Response to intervention: Nurse notified, See doc flow Activity Tolerance:  
poor Please refer to the flowsheet for vital signs taken during this treatment. After treatment:  
[] Patient left in no apparent distress sitting up in chair 
[x] Patient left in no apparent distress in bed 
[x] Call bell left within reach [x] Nursing notified Emre Ac 
[x] Personal items in reach [x] Caregiver present [x] Bed/chair alarm activated 
[] SCDs applied COMMUNICATION/EDUCATION:  
[x]         Role of Physical Therapy in the acute care setting. [x]         Fall prevention education was provided and the patient/caregiver indicated understanding. [x]         Patient/family have participated as able in working toward goals and plan of care. [x]         Patient/family agree to work toward stated goals and plan of care. []         Patient understands intent and goals of therapy, but is neutral about his/her participation. []         Patient is unable to participate in stated goals/plan of care: ongoing with therapy staff. 
[]         Out of bed at least 3-5 times a day with nursing assistance. []         Other: 
 
   
Keanu Hugo, PT, DPT Time Calculation: 23 mins

## 2020-03-03 NOTE — PROGRESS NOTES
OT attempted 2x, family politely refusing, requesting to let pt rest and not pt continues sleeping, noting opening eyes to verbal/tactile stimuli. Will continue to follow.

## 2020-03-03 NOTE — PROGRESS NOTES
End of Shift Note Bedside and verbal shift change report given to Maru English RN (On coming nurse) by Kyle Yu RN (Off going nurse). Report included the following information:  
   --Procedure Summary 
   --MAR, 
   --Recent Results --Med Rec Status SBAR Recommendations:  
 
Issues for Provider to address Activity This Shift 
 
 [] Bed Rest Order 
 [x] Refused 
 [] Dangled  
 [] TDWB Ambulating: 
   [] Bathroom [] BSC [] Room/Hallway Up in Chair for meals []Yes [] No  
Voiding       [] Yes  [x] No 
Pham          [] Yes  [x] No 
Incontinent [] Yes  [] No 
 
DUE TO VOID now bladder scan 280ml @ 625 AM on coming nurse notified and aware. POUR        [] Yes [] No 
Purewick    [x] Yes [] No 
New Onset [] Yes [] No Straight Cath   []Yes  [] No 
Condom Cath  [] Yes [] No 
MD Called      [] Yes  [] No  
Blood Sugars Managed []Yes [x] No   
Bowels Moved [] Yes [x] No 
 
Incontinent     [] Yes [x] No Passed Gas []Yes [] No 
 
New Onset  []Yes [] No 
  
 
 MD Called []Yes  [] No 
  
CHG Bath Done Before Surgery After Surgery  
  
[x] Yes  [] No 
[] Yes  [x] No   
  
Drain Removed [] Yes  [] No [x] N/A Dressing Changed [] Yes   [x] No [] N/A Nausea/Vomiting [] Yes   [x] No    
Ice Packs Changed [x] Yes   [] No  [] N/A Incentive Spirometer  [x] Yes  [] No     
SCD Pumps On Ankle Pumping  [x] Yes   [] No  
  
[] Yes   [] No    
  
Telemetry Monitoring [] Yes   [x] No   Rhythm

## 2020-03-03 NOTE — ROUTINE PROCESS
Pt had a 5 beat run of v tach. Dr. Kalpana Pavon notified. He stated to call cardiology. Cardioilogy paged no response.

## 2020-03-03 NOTE — PROGRESS NOTES
[facility] [address] Progress Note Patient: Sabino Moore               Sex: female          DOA: 2/29/2020 YOB: 1922      Age:  80 y.o.        LOS:  LOS: 3 days S/P  Procedure(s): HIP HEMIARTHROPLASTY Subjective:  
 
Pt sleeping with son at bedside. No new complaints per son. Objective:  
  
Blood pressure 165/67, pulse 84, temperature 97.9 °F (36.6 °C), resp. rate 22, height 4' 11\" (1.499 m), weight 141 lb (64 kg), SpO2 95 %, not currently breastfeeding. Well developed, Well Nourished alert, cooperative, no distress, confused Incision clean, dry, no drainage, dressing in place Mild swelling and tenderness noted in right lower extremity (hip) Sensory is intact Motor is intact 
nv intact 2+distal pulses Neg calf tenderness Neg for facial asymmetry Labs: 
CBC 
@ 
CBC:  
Lab Results Component Value Date/Time WBC 8.8 03/02/2020 09:50 AM  
 RBC 3.43 (L) 03/02/2020 09:50 AM  
 HGB 9.3 (L) 03/02/2020 09:50 AM  
 HCT 29.9 (L) 03/02/2020 09:50 AM  
 PLATELET 261 46/02/0946 09:50 AM  
@ Coagulation Lab Results Component Value Date INR 1.1 02/29/2020 APTT 29.1 02/29/2020 Basic Metabolic Profile Lab Results Component Value Date  03/02/2020 CO2 20 (L) 03/02/2020 BUN 43 (H) 03/02/2020 Medications Reviewed Assessment/Plan Active Problems: Hyperkalemia (2/29/2020) Chronic kidney disease (2/29/2020) Elevated troponin (2/29/2020) Closed right hip fracture, initial encounter ([facility] [address] (2/29/2020) Procedure(s): HIP HEMIARTHROPLASTY :  
 
1. PT and/or OT Consults: YES continue PT/OT: oob to chair, gentle rom, WBAT RLE, posterior hip precautions 2. Incision Care:  Routine Incision Care and Dressing Changes as necessary: dressing changes POD#2 and as needed 3. Pain control 4. DVT Prophylaxis - SCD in place, Heparin 5000 units DISCHARGE PLANNING  Intervention : Will likely need [facility] (SNF) placement. Will wait for PT evaluation and recommendation. DO John Perry [phone]

## 2020-03-03 NOTE — PROGRESS NOTES
0715 
Alert, no apparent distress, has not voided yet, last bladder scan 280 ml @625 AM. On coming nurse notified and will resume care. [phone] Paged MD and informed about above situation. New orders received. See Kardex.

## 2020-03-03 NOTE — PROGRESS NOTES
In Patient Progress note Admit Date: 2/29/2020 Impression: #1 nonoliguric TEODORO on chronic kidney disease stage IV( baseline ~ 1.8-2) Creatinine back to baseline #2 hyperkalemia, better #3 metabolic acidosis with non-anion gap, resolved #4 hypertension , increase coreg #5 right hip fracture presently in surgery #6 elevated troponin's, cards following #7 hypernatremia , resolved #8 h/o diastolic dysfunction , outpatient diuretic therapy #9 advanced dementia/delirium  
  
Plan: 
#1 d/c bicarb drip , switch to D5 1/2 NS @ 30 cc/hrs #2 hold diuretics #3 avoid NSAID ,nephrotoxins , IV contrast 
#4 increase coreg to 6.25mg BID goal -562 systolic , no need To aggressively control BP in this setting #5 recheck BMP in AM 
  
Please call with questions, 
  
Margareth Espinal MD [facility] Cell [phone] Pager: [phone] Subjective:  
 
Denies SOB/CP Sleepy this afternoon Current Facility-Administered Medications:  
  carvediloL (COREG) tablet 6.25 mg, 6.25 mg, Oral, BID WITH MEALS, Keon Jordan MD, 6.25 mg at 03/03/20 1614   tamsulosin (FLOMAX) capsule 0.4 mg, 0.4 mg, Oral, DAILY, Sanju Newell MD, 0.4 mg at 03/03/20 1253   traMADol (ULTRAM) tablet 25 mg, 25 mg, Oral, Q6H PRN, Sanju Valenzuela MD, 25 mg at 03/03/20 1514   sodium bicarbonate (8.4%) 50 mEq in dextrose 5% 1,000 mL infusion, , IntraVENous, CONTINUOUS, Keon Jordan MD, Last Rate: 75 mL/hr at 03/02/20 1504   sodium chloride (NS) flush 5-40 mL, 5-40 mL, IntraVENous, Q8H, Ajay Salinas DO, 10 mL at 03/03/20 0539 
  sodium chloride (NS) flush 5-40 mL, 5-40 mL, IntraVENous, PRN, Ajay Salinas DO 
  felodipine (PLENDIL SR) 24 hr tablet 10 mg, 10 mg, Oral, DAILY, Rohit Coleman MD, 10 mg at 03/03/20 5721   allopurinoL (ZYLOPRIM) tablet 100 mg, 100 mg, Oral, EVERY MON & FRI, Rohit Coleman MD, 100 mg at 03/02/20 1611 
  atorvastatin (LIPITOR) tablet 20 mg, 20 mg, Oral, DAILY, [place], Rohit CALDWELL MD, 20 mg at 03/03/20 1776   loratadine (CLARITIN) tablet 10 mg, 10 mg, Oral, DAILY PRN, Rohit Coleman MD 
  [Held by provider] furosemide (LASIX) tablet 20 mg, 20 mg, Oral, DAILY, Rohit Coleman MD 
  haloperidol (HALDOL) 2 mg/mL oral solution 2 mg, 2 mg, Oral, Q6H PRN, Rohit Coleman MD, 2 mg at 03/03/20 1614   heparin (porcine) injection 5,000 Units, 5,000 Units, SubCUTAneous, Q8H, Rohit Coleman MD, 5,000 Units at 03/03/20 1514   acetaminophen (TYLENOL) suppository 650 mg, 650 mg, Rectal, Q6H PRN, Rohit Coleman MD, 650 mg at 03/02/20 2030 Objective:  
 
Visit Vitals BP (P) 147/51 Pulse (P) 83 Temp (!) (P) 100.5 °F (38.1 °C) Resp (P) 18 Ht 4' 11\" (1.499 m) Wt 64 kg (141 lb) SpO2 95% Breastfeeding No  
BMI 28.48 kg/m² Intake/Output Summary (Last 24 hours) at 3/3/2020 1718 Last data filed at 3/3/2020 1241 Gross per 24 hour Intake 1598.75 ml Output 600 ml Net 998.75 ml Physical Exam:  
 
Sleepy Patient is in no apparent distress. HEENT: mucosa moist  
Neck: no cervical lymphadenopathy or thyromegaly. Lungs: good air entry, clear to auscultation bilaterally Cardiovascular system: S1, S2, regular rate and rhythm. Abdomen: soft, non tender, non distended. Extremities: no clubbing, cyanosis or edema. Neurologic: Alert, oriented time three. Data Review: 
 
Recent Labs 03/03/20 
1246 WBC 8.4  
RBC 3.03* HCT 25.7* MCV 84.8 MCH 26.7 MCHC 31.5  
RDW 15.6* Recent Labs 03/03/20 
1246 03/02/20 
1606 03/02/20 
0950 03/01/20 
1920 03/01/20 
0945 03/01/20 
0056 02/29/20 
1838 BUN 35*  43* 43* 45* 40* 37* 32* 29* CREA 1.78*  2.17* 2.66* 2.85* 2.81* 2.70* 2.29* 1.84* CA 8.3*  8.2* 7.7* 8.4* 8.4* 8.8 8.9 8.8 ALB 2.3*  --  2.6* 2.8*  --   --  3.0*  
K 4.0  4.1 4.4 4.7 5.1 5.4 6.7* 6.1*   142 143 146* 145 143 142 141   111 114* 118* 118* 117* 117* 115* CO2 23  24 20* 19* 16* 19* 20* 21 PHOS 2.5  --  4.3 5.5* 5.7*  --   --   
*  127* 122* 138* 116* 122* 117* 108* Yudelka Dias MD

## 2020-03-03 NOTE — PROGRESS NOTES
TRANSFER - OUT REPORT: 
 
Verbal report given to Saundra(name) on Zainab Romero  being transferred to (unit) for routine progression of care Report consisted of patients Situation, Background, Assessment and  
Recommendations(SBAR). Information from the following report(s) SBAR, Kardex, Procedure Summary and Intake/Output was reviewed with the receiving nurse. Lines:  
Peripheral IV 03/02/20 Left Arm (Active) Site Assessment Clean, dry, & intact 3/2/2020  8:43 AM  
Phlebitis Assessment 0 3/2/2020  8:43 AM  
Infiltration Assessment 0 3/2/2020  8:43 AM  
Dressing Status Clean, dry, & intact 3/2/2020  8:43 AM  
Dressing Type Transparent;Tape 3/2/2020  8:43 AM  
Hub Color/Line Status Blue; Infusing 3/2/2020  8:43 AM  
  
 
Opportunity for questions and clarification was provided. Patient transported with: 
Patient belongings

## 2020-03-03 NOTE — PROGRESS NOTES
Bedside and Verbal shift change report given to Yas Lozano RN (oncoming nurse) by Carlos Wray RN (offgoing nurse). Report included the following information SBAR and Kardex.

## 2020-03-03 NOTE — PROGRESS NOTES
[facility] Hospitalist Group Progress Note Patient: Graciela Perla Age: 80 y.o. : 1922 MR#: [id number] SSN: [id number] Date/Time: 3/3/2020 Subjective:  
 
Patient lying in bed in NAD, sleepy. Son and niece at bedside Assessment/Plan: - R Hip fracture 
-s/p Mechanical Fall 
- elevated trop - Chronic diastolic chf 
- pulmonary HTN 
- dementia - TEODORO on top of CKD 4 
-H/o HTN 
 
 
PLAN As per ortho PT, OT Cardio input noted Nephrology following , iv fluids Monitor labs 
dvt prophylaxis D/w son and niece at bedside Dispo- ?snf DNR Case discussed with:  []Patient  [x]Family  [x]Nursing  []Case Management DVT Prophylaxis:  []Lovenox  []Hep SQ  []SCDs  []Coumadin   []On Heparin gtt Objective:  
VS:  
Visit Vitals /67 (BP 1 Location: Left arm, BP Patient Position: At rest) Pulse 84 Temp 97.9 °F (36.6 °C) Resp 22 Ht 4' 11\" (1.499 m) Wt 64 kg (141 lb) SpO2 95% Breastfeeding No  
BMI 28.48 kg/m² Tmax/24hrs: Temp (24hrs), Av.9 °F (36.6 °C), Min:96.5 °F (35.8 °C), Max:98.7 °F (37.1 °C) Input/Output:  
 
Intake/Output Summary (Last 24 hours) at 3/3/2020 1427 Last data filed at 3/3/2020 1241 Gross per 24 hour Intake 1598.75 ml Output 600 ml Net 998.75 ml General:  Sleepy, 
Cardiovascular:  S1S2+, RRR Pulmonary:  CTA b/l GI:  Soft, BS+, NT, ND Extremities:  trace edema Labs:   
Recent Results (from the past 24 hour(s)) URINALYSIS W/MICROSCOPIC Collection Time: 20  4:05 PM  
Result Value Ref Range Color YELLOW Appearance CLEAR Specific gravity 1.018 1.005 - 1.030    
 pH (UA) 5.0 5.0 - 8.0 Protein 30 (A) NEG mg/dL Glucose NEGATIVE  NEG mg/dL Ketone NEGATIVE  NEG mg/dL Bilirubin NEGATIVE  NEG Blood MODERATE (A) NEG Urobilinogen 0.2 0.2 - 1.0 EU/dL Nitrites NEGATIVE  NEG  Leukocyte Esterase TRACE (A) NEG    
 WBC 11 to 20 0 - 4 /hpf  
 RBC 0 to 3 0 - 5 /hpf  
 Epithelial cells 1+ 0 - 5 /lpf Bacteria 2+ (A) NEG /hpf Yeast 1+ (A) NEG Budding yeast POSITIVE (A) NEG    
METABOLIC PANEL, BASIC Collection Time: 03/02/20  4:06 PM  
Result Value Ref Range Sodium 143 136 - 145 mmol/L Potassium 4.4 3.5 - 5.5 mmol/L Chloride 114 (H) 100 - 111 mmol/L  
 CO2 20 (L) 21 - 32 mmol/L Anion gap 9 3.0 - 18 mmol/L Glucose 122 (H) 74 - 99 mg/dL BUN 43 (H) 7.0 - 18 MG/DL Creatinine 2.66 (H) 0.6 - 1.3 MG/DL  
 BUN/Creatinine ratio 16 12 - 20 GFR est AA 20 (L) >60 ml/min/1.73m2 GFR est non-AA 17 (L) >60 ml/min/1.73m2 Calcium 7.7 (L) 8.5 - 10.1 MG/DL  
CBC WITH AUTOMATED DIFF Collection Time: 03/03/20 12:46 PM  
Result Value Ref Range WBC 8.4 4.6 - 13.2 K/uL  
 RBC 3.03 (L) 4.20 - 5.30 M/uL HGB 8.1 (L) 12.0 - 16.0 g/dL HCT 25.7 (L) 35.0 - 45.0 % MCV 84.8 74.0 - 97.0 FL  
 MCH 26.7 24.0 - 34.0 PG  
 MCHC 31.5 31.0 - 37.0 g/dL  
 RDW 15.6 (H) 11.6 - 14.5 % PLATELET 138 250 - 581 K/uL MPV 10.9 9.2 - 11.8 FL  
 NEUTROPHILS 77 (H) 40 - 73 % LYMPHOCYTES 11 (L) 21 - 52 % MONOCYTES 10 3 - 10 % EOSINOPHILS 2 0 - 5 % BASOPHILS 0 0 - 2 %  
 ABS. NEUTROPHILS 6.5 1.8 - 8.0 K/UL  
 ABS. LYMPHOCYTES 0.9 0.9 - 3.6 K/UL  
 ABS. MONOCYTES 0.9 0.05 - 1.2 K/UL  
 ABS. EOSINOPHILS 0.1 0.0 - 0.4 K/UL  
 ABS. BASOPHILS 0.0 0.0 - 0.1 K/UL  
 DF AUTOMATED RENAL FUNCTION PANEL Collection Time: 03/03/20 12:46 PM  
Result Value Ref Range Sodium 142 136 - 145 mmol/L Potassium 4.1 3.5 - 5.5 mmol/L Chloride 111 100 - 111 mmol/L  
 CO2 24 21 - 32 mmol/L Anion gap 7 3.0 - 18 mmol/L Glucose 127 (H) 74 - 99 mg/dL BUN 43 (H) 7.0 - 18 MG/DL Creatinine 2.17 (H) 0.6 - 1.3 MG/DL  
 BUN/Creatinine ratio 20 12 - 20 GFR est AA 25 (L) >60 ml/min/1.73m2 GFR est non-AA 21 (L) >60 ml/min/1.73m2 Calcium 8.2 (L) 8.5 - 10.1 MG/DL Phosphorus 2.5 2.5 - 4.9 MG/DL Albumin 2.3 (L) 3.4 - 5.0 g/dL Additional Data Reviewed: Signed By: Yara Raya MD   
 March 3, 2020

## 2020-03-04 NOTE — PROGRESS NOTES
In Patient Progress note Admit Date: 2/29/2020 Impression: #1 nonoliguric TEODORO on chronic kidney disease stage IV( baseline ~ 1.8-2) Creatinine back to baseline #2 hyperkalemia, better #3 metabolic acidosis with non-anion gap, resolved #4 hypertension , increase coreg #5 right hip fracture presently in surgery #6 elevated troponin's, cards following #7 hypernatremia , resolved #8 h/o diastolic dysfunction , outpatient diuretic therapy #9 advanced dementia/delirium  
  
Plan: 
#1 d/c IVF #2 doesn't need diuretics on discharge #3 avoid NSAID ,nephrotoxins , IV contrast 
#4 coreg to 6.25mg BID goal -911 systolic OK to discharge from renal stand point  
  
Please call with questions, 
  
Danelle Harris MD FASN Cell [phone] Pager: [phone] Subjective:  
 
Denies SOB/CP Current Facility-Administered Medications:  
  traMADol (ULTRAM) tablet 25 mg, 25 mg, Oral, Q6H PRN, Sanju Newell MD, 25 mg at 03/04/20 1539 
  docusate sodium (COLACE) capsule 100 mg, 100 mg, Oral, BID, Jacey Peters MD, Stopped at 03/04/20 1800   carvediloL (COREG) tablet 6.25 mg, 6.25 mg, Oral, BID WITH MEALS, Keon Jordan MD, 6.25 mg at 03/04/20 1655   tamsulosin (FLOMAX) capsule 0.4 mg, 0.4 mg, Oral, DAILY, Sanju Newell MD, 0.4 mg at 03/04/20 8437   dextrose 5 % - 0.45% NaCl infusion, 30 mL/hr, IntraVENous, CONTINUOUS, Keon Jordan MD, Last Rate: 30 mL/hr at 03/03/20 1926, 30 mL/hr at 03/03/20 1926 
  sodium chloride (NS) flush 5-40 mL, 5-40 mL, IntraVENous, Q8H, Ajay Salinas DO, 10 mL at 03/04/20 0517 
  sodium chloride (NS) flush 5-40 mL, 5-40 mL, IntraVENous, PRN, Ajay Salinas DO 
  felodipine (PLENDIL SR) 24 hr tablet 10 mg, 10 mg, Oral, DAILY, Rohit Coleman MD, 10 mg at 03/04/20 6411   allopurinoL (ZYLOPRIM) tablet 100 mg, 100 mg, Oral, EVERY MON & FRI, Rohit Coleman MD, 100 mg at 03/02/20 6105   atorvastatin (LIPITOR) tablet 20 mg, 20 mg, Oral, DAILY, Rohit Coleman MD, 20 mg at 03/04/20 1867   loratadine (CLARITIN) tablet 10 mg, 10 mg, Oral, DAILY PRN, Rohit Coleman MD 
  [Held by provider] furosemide (LASIX) tablet 20 mg, 20 mg, Oral, DAILY, Rohit Coleman MD 
  haloperidol (HALDOL) 2 mg/mL oral solution 2 mg, 2 mg, Oral, Q6H PRN, Rohit Coleman MD, 2 mg at 03/03/20 1614   heparin (porcine) injection 5,000 Units, 5,000 Units, SubCUTAneous, Q8H, Rohit Coleman MD, 5,000 Units at 03/04/20 1310   acetaminophen (TYLENOL) suppository 650 mg, 650 mg, Rectal, Q6H PRN, Rohit Coleman MD, 650 mg at 03/02/20 2030 Objective:  
 
Visit Vitals /57 Pulse 88 Temp 98.3 °F (36.8 °C) Resp 18 Ht 4' 11\" (1.499 m) Wt 64 kg (141 lb) SpO2 95% Breastfeeding No  
BMI 28.48 kg/m² Intake/Output Summary (Last 24 hours) at 3/4/2020 1726 Last data filed at 3/4/2020 1218 Gross per 24 hour Intake 385 ml Output 400 ml Net -15 ml Physical Exam:  
 
Sleepy Patient is in no apparent distress. HEENT: mucosa moist  
Neck: no cervical lymphadenopathy or thyromegaly. Lungs: good air entry, clear to auscultation bilaterally Cardiovascular system: S1, S2, regular rate and rhythm. Abdomen: soft, non tender, non distended. Extremities: no clubbing, cyanosis or edema. Neurologic: Alert, oriented time three. Data Review: 
 
Recent Labs 03/03/20 
1246 WBC 8.4  
RBC 3.03* HCT 25.7* MCV 84.8 MCH 26.7 MCHC 31.5  
RDW 15.6* Recent Labs 03/04/20 
0704 03/03/20 
1246 03/02/20 
1606 03/02/20 
0950 03/01/20 
1920 BUN 37* 35*  43* 43* 45* 40* CREA 1.73* 1.78*  2.17* 2.66* 2.85* 2.81* CA 7.9* 8.3*  8.2* 7.7* 8.4* 8.4* ALB  --  2.3*  --  2.6* 2.8*  
K 4.0 4.0  4.1 4.4 4.7 5.1  140  142 143 146* 145  110  111 114* 118* 118* CO2 24 23  24 20* 19* 16* PHOS  --  2.5  --  4.3 5.5*  
 GLU 94 116*  127* 122* 138* 116* Bette Ford MD

## 2020-03-04 NOTE — PROGRESS NOTES
Transition of Care Plan to SNF/Rehab 
 
SNF/Rehab Transition: 
Patient has been accepted to [facility] and meets criteria for admission. Patient will  be transported by [facility] and expected to leave at 4:30 pm. 
Communication to Patient/Family: Met with patient and Kellee Bridges via phone and they are agreeable to the transition plan. Communication to SNF/Rehab: 
Bedside RN, Nette Horne,  has been notified of the transition plan to the facility and to call report [phone] Discharge information has been updated on the AVS. And communicated to facility via [facility], or CC link. SNF/Rehab Transition: PCP/Specialist: F/u with PCP in 1 week F/u with nephrologist in 2 weeks F/u with Cardiologist in 2 weeks F/u with Orthopedics in 10 days 
  
 
Nursing Please include all hard scripts for controlled substances, med rec and dc summary, and AVS in packet. Reviewed and confirmed with facility representative, Sharyle Downs  at  [facility] they can manage the patient care needs for the following:  
 
Arely Smiling with (X) only those applicable: 
 
Medication: 
[x]  Medications will be available at the facility []  IV Antibiotics [x]  Controlled Substance - hard copy to be sent with patient  
[]   Labs -Check CBC, CMP, Mg in 2 day Documents: 
[x] Hard RX  Number sent 1 [] MAR 
[] Kardex [] AVS 
[] Wound care note [x]Transfer Summary/Discharge Summary Equipment: 
[]  CPAP/BiPAP []  Wound Vacuum 
[]  Pham or Urinary Device 
[]  PICC/Central Line 
[]  Nebulizer 
[]  Ventilator Treatment: 
[]Isolation (for MRSA, VRE, etc.) []Surgical Drain Management []Tracheostomy Care 
[]Dressing Changes []Dialysis with transportation and chair time Center Mode of tranpsort []PEG Care []Oxygen []Daily Weights for Heart Failure Dietary: 
[]Any diet limitations []Tube Feedings []Total Parenteral Management (TPN) [x] Patient /Family declines to have screening completed or provide financial information for screening. Financial Resources: 
Medicaid  N/A 
[] Initiated and application pending  
[] Full coverage Advanced Care Plan: 
[]Surrogate Decision Maker of Care 
[]POA [x]Communicated Code Status/ sent DDNR & POST Other Lior Schaumann, BSN, RN Pager # [phone] Care Manager

## 2020-03-04 NOTE — PROGRESS NOTES
[address] Progress Note Patient: Mariaa June               Sex: female          DOA: 2/29/2020 YOB: 1922      Age:  80 y.o.        LOS:  LOS: 4 days S/P  Procedure(s): HIP HEMIARTHROPLASTY Subjective:  
 
Pt resting comfortably in chair. No Complaints Objective:  
  
Blood pressure 138/58, pulse 78, temperature 98.6 °F (37 °C), resp. rate 18, height 4' 11\" (1.499 m), weight 141 lb (64 kg), SpO2 96 %, not currently breastfeeding. Well developed, Well Nourished alert, cooperative, no distress, confused Incision clean, dry, no drainage, dressing in place Mild swelling and tenderness noted in right lower extremity (hip) Sensory is intact Motor is intact 
nv intact 2+distal pulses Neg calf tenderness Neg for facial asymmetry Labs: 
CBC 
@ 
CBC:  
Lab Results Component Value Date/Time WBC 8.4 03/03/2020 12:46 PM  
 RBC 3.03 (L) 03/03/2020 12:46 PM  
 HGB 8.1 (L) 03/03/2020 12:46 PM  
 HCT 25.7 (L) 03/03/2020 12:46 PM  
 PLATELET 241 08/17/0639 12:46 PM  
@ Coagulation Lab Results Component Value Date INR 1.1 02/29/2020 APTT 29.1 02/29/2020 Basic Metabolic Profile Lab Results Component Value Date  03/04/2020 CO2 24 03/04/2020 BUN 37 (H) 03/04/2020 Medications Reviewed Assessment/Plan Active Problems: Hyperkalemia (2/29/2020) Chronic kidney disease (2/29/2020) Elevated troponin (2/29/2020) Closed right hip fracture, initial encounter ([facility] [address] (2/29/2020) Procedure(s): HIP HEMIARTHROPLASTY :  
 
1. PT and/or OT Consults: YES continue PT/OT: oob to chair, gentle rom, WBAT RLE, posterior hip precautions 2. Incision Care:  Routine Incision Care and Dressing Changes as necessary: dressing changes POD#2 and as needed 3. Pain control 4. DVT Prophylaxis - SCD in place, Heparin 5000 units DISCHARGE PLANNING  Intervention : Will likely need [facility] (SNF) placement. Will wait for PT evaluation and recommendation. DO [facility]

## 2020-03-04 NOTE — PROGRESS NOTES
End of Shift Note Bedside and verbal shift change report given to Eddie Capellan RN (On coming nurse) by Alena Gilbert RN (Off going nurse). Report included the following information:  
   --Procedure Summary 
   --MAR, 
   --Recent Results --Med Rec Status SBAR Recommendations:  
 
Issues for Provider to address Activity This Shift 
 
 [] Bed Rest Order 
 [x] Refused 
 [] Dangled  
 [] TDWB Ambulating: 
   [] Bathroom [] BSC [] Room/Hallway Up in Chair for meals []Yes [] No  
Voiding       [] Yes  [x] No 
Pham          [] Yes  [x] No 
Incontinent [] Yes  [] No 
 
DUE TO VOID now bladder scan 280ml @ 625 AM on coming nurse notified and aware. POUR        [] Yes [] No 
Purewick    [x] Yes [] No 
New Onset [] Yes [] No Straight Cath   []Yes  [] No 
Condom Cath  [] Yes [] No 
MD Called      [] Yes  [] No  
Blood Sugars Managed []Yes [x] No   
Bowels Moved [] Yes [x] No 
 
Incontinent     [] Yes [x] No Passed Gas []Yes [] No 
 
New Onset  []Yes [] No 
  
 
 MD Called []Yes  [] No 
  
CHG Bath Done Before Surgery After Surgery  
  
[x] Yes  [] No 
[] Yes  [x] No   
  
Drain Removed [] Yes  [] No [x] N/A Dressing Changed [] Yes   [x] No [] N/A Nausea/Vomiting [] Yes   [x] No    
Ice Packs Changed [x] Yes   [] No  [] N/A Incentive Spirometer  [x] Yes  [] No     
SCD Pumps On Ankle Pumping  [x] Yes   [] No  
  
[] Yes   [] No    
  
Telemetry Monitoring [] Yes   [x] No   Rhythm

## 2020-03-04 NOTE — PROGRESS NOTES
Problem: Mobility Impaired (Adult and Pediatric) Goal: *Acute Goals and Plan of Care (Insert Text) Description Physical Therapy Goals Initiated 3/2/2020 and to be accomplished within 7 day(s) 1. Patient will move from supine to sit and sit to supine , scoot up and down and roll side to side in bed with minimal assistance/contact guard assist.    
2.  Patient will transfer from bed to chair and chair to bed with minimal assistance/contact guard assist using the least restrictive device. 3.  Patient will perform sit to stand with minimal assistance/contact guard assist. 
4.  Patient will ambulate with minimal assistance/contact guard assist for 25 feet with the least restrictive device. 5.  Assess stairs (4) as appropriate Prior Level of Function:  
Patient was minimal assistance/contact guard assist for all mobility including gait using rolling walker. Patient lives with her grandson in a single story home with 4 [address] B HR. Patient's has a caregiver for 13 hours during the day who was able to describe patient's PLOF. Outcome: Progressing Towards Goal 
 PHYSICAL THERAPY TREATMENT Patient: Drew Do (50 y.o. female) Date: 3/4/2020 Diagnosis: Closed right hip fracture, initial encounter ([facility] [address] Alex Chin Hyperkalemia [E87.5] Chronic kidney disease [N18.9] Elevated troponin [R79.89] <principal problem not specified> Procedure(s) (LRB): HIP HEMIARTHROPLASTY (Right) 3 Days Post-Op Precautions: Fall, WBAT, Total hip(RLE, michelle-arthroplasty) ASSESSMENT: 
Pt found supine HOB elevated willing to participate w/ therapy. Tx performed w/ OT to maximize safety of pt and staff as well as to facilitate balance and stability all while maintaining hip precautions. Pt able to make functional progress this visit, improving in balance and stability in sitting and standing, however cont to req additional assistance for all mobility tasks compared to PLOF.  Pt mobilized to EOB, req manual and verbal cueing for sequencing. W/ max A, pt was able to obtain an upright position at EOB. Initially, balance was challenged as pt tended to lean posteriorly into support of therapist. W/ proper positioning of hands and alignment in sitting, pt was able to better maintain balance supported and unsupported while performing self care tasks, only req assistance occasionally w/ more dynamic activities. Pt progressed to a standing position, having difficulty w/ follow commands, however w/ familiar tasks and objects like the walker, pt was able to obtain better positioning to stand. Pt stood, and impulsively sat back to EOB. On 2nd transfer, pt was able to better maintain standing position voicing no increase in pain initially, however displayed an antalgic pattern when amb to chair for 5'. Pt req constant assistance for RW negotiation as pt forcefully pushes RW away from COG. Pt displayed poor carryover w/ safety for stand to sit, not ensuring safe surface. Pt left in room w/ caregiver, educated pt and caregiver on importance of utilization of nursing staff for assistance, importance of use of BSC as purewick not necessary anymore, and nursing notified of progress. Although pt has caregiver during the day, functionally, pt is at a lower lever than PTA. Thus pt would greatly benefit from SNF prior to return home to maximize safety w/ mobility. Progression toward goals: good [x]      Improving appropriately and progressing toward goals 
[]      Improving slowly and progressing toward goals 
[]      Not making progress toward goals and plan of care will be adjusted PLAN: 
Patient continues to benefit from skilled intervention to address the above impairments. Continue treatment per established plan of care. Discharge Recommendations:  Chris Lara Further Equipment Recommendations for Discharge: has necessary equipment SUBJECTIVE:  
Patient stated Good night! Cash Shells 
 
 OBJECTIVE DATA SUMMARY:  
Critical Behavior: 
Neurologic State: Confused Orientation Level: Disoriented X4 Cognition: Decreased command following Safety/Judgement: Fall prevention, Decreased insight into deficits Functional Mobility Training: 
Bed Mobility: 
Supine to Sit: Maximum assistance;Assist x2 Scooting: Maximum assistance;Assist x2 Transfers: 
Sit to Stand: Moderate assistance;Assist x2 Stand to Sit: Moderate assistance;Assist x2 Balance: 
Sitting: Impaired; With support Sitting - Static: Fair (occasional)(to fair +) Sitting - Dynamic: Fair (occasional) Standing: Impaired; With support Standing - Static: Poor Standing - Dynamic : Poor Ambulation/Gait Training: 
Distance (ft): 5 Feet (ft) Assistive Device: Walker, rolling Ambulation - Level of Assistance: Moderate assistance;Assist x2 Gait Abnormalities: Antalgic;Decreased step clearance Right Side Weight Bearing: As tolerated Base of Support: Center of gravity altered Speed/Saige: Slow Step Length: Right shortened;Left shortened Pain: 
Pain level pre-treatment: 0/10 Pain level post-treatment: 0/10 Did increase w/ mobility Activity Tolerance:  
Good Please refer to the flowsheet for vital signs taken during this treatment. After treatment:  
[x] Patient left in no apparent distress sitting up in chair 
[] Patient left in no apparent distress in bed 
[x] Call bell left within reach [x] Nursing notified 
[x] Caregiver present 
[] Bed alarm activated 
[] SCDs applied COMMUNICATION/EDUCATION:  
[x]         Role of Physical Therapy in the acute care setting. [x]         Fall prevention education was provided and the patient/caregiver indicated understanding. [x]         Patient/family have participated as able in working toward goals and plan of care. [x]         Patient/family agree to work toward stated goals and plan of care.  
[]         Patient understands intent and goals of therapy, but is neutral about his/her participation. []         Patient is unable to participate in stated goals/plan of care: ongoing with therapy staff. 
[]         Other: 
 
   
Tru Medina, PTA Time Calculation: 23 mins

## 2020-03-04 NOTE — DISCHARGE SUMMARY
[address], [address] DISCHARGE SUMMARY Name: Nicholas Riggs MRN: [id number] Age / Sex: 80 y.o. / female CSN: [id number] YOB: 1922 Length of Stay: 4 days Admit Date: 2/29/2020 Discharge Date: PRIMARY CARE PHYSICIAN: Shannan Richmond MD 
 
 
DISCHARGE DIAGNOSES: 
R Hip fracture 
-s/p Mechanical Fall 
- elevated trop - Chronic diastolic chf 
- pulmonary HTN 
- dementia - TEODORO on top of CKD 4 
- HTN 
- Patient is DNR and DNI , has POST 
 
CONSULTS CALLED: Orthopedics, cardiology, nephrology PROCEDURES DONE: R Hip hemiarthroplasty COURSE IN THE HOSPITAL: This is a 80-year-old female who was brought to the ED after she had a fall at home. Patient was complaining of some right hip pain. Further evaluation showed that patient had a right hip fracture. Patient was admitted and orthopedics was consulted. Subsequently when patient underwent a right hip hemiarthroplasty by orthopedics. Patient was noted to have some acute kidney injury on top of chronic kidney disease stage IV. Cardiology and nephrology were consulted. Echocardiogram was done. Patient was continued on DVT prophylaxis. Patient was started on some IV fluids and renal function was monitored. Diuretics were held. Renal function has shown improvement. Patient was initially very confused and delirious. But has shown some improvement since. PT OT have evaluated the patient. The plan is for the patient to be transitioned to skilled nursing facility today. I have discussed the discharge plans with the patient's son Joel Angeles. I have discussed with the . I have discussed with the nephrologist and he recommends to discontinue diuretics. I have discussed with the orthopedic surgeon and at this time he recommends to resume aspirin. Patient will be receiving subcu heparin for DVT prophylaxis.  
 
 
MEDICATIONS ON DISCHARGE: 
 
 Current Discharge Medication List  
  
START taking these medications Details  
carvediloL (COREG) 6.25 mg tablet Take 1 Tab by mouth two (2) times daily (with meals). Qty: 60 Tab, Refills: 0  
  
heparin sodium,porcine (HEPARIN, PORCINE,) 5,000 unit/mL injection 1 mL by SubCUTAneous route every eight (8) hours for 26 days. Qty: 78 mL, Refills: 0  
  
tamsulosin (FLOMAX) 0.4 mg capsule Take 1 Cap by mouth daily. Qty: 30 Cap, Refills: 0  
  
traMADol (ULTRAM) 50 mg tablet Take 0.5 Tabs by mouth every six (6) hours as needed for Pain for up to 5 days. Max Daily Amount: 100 mg. Qty: 10 Tab, Refills: 0 Associated Diagnoses: Closed right hip fracture, initial encounter ([facility] [address] CONTINUE these medications which have NOT CHANGED Details Omeprazole delayed release (PRILOSEC D/R) 20 mg tablet Take 20 mg by mouth daily. Indications: gastroesophageal reflux disease  
  
lactobacillus sp. 50 billion cpu (BIO-K PLUS) 50 billion cell -375 mg cap capsule Take 1 Cap by mouth daily. Qty: 5 Cap, Refills: 0  
  
ergocalciferol (ERGOCALCIFEROL) 50,000 unit capsule TAKE 1 CAPSULE WEEKLY Refills: 5  
  
vitamin e (E GEMS) 1,000 unit capsule Take 1,000 Units by mouth daily. ferrous sulfate (SLOW FE) 142 mg (45 mg iron) ER tablet Take  by mouth Daily (before breakfast). folic acid/multivit-min/lutein (CENTRUM SILVER PO) Take 1 Tab by mouth daily. allopurinol (ZYLOPRIM) 100 mg tablet Take 100 mg by mouth every Monday and Friday. atorvastatin (LIPITOR) 20 mg tablet Take 20 mg by mouth daily. loratadine (CLARITIN) 10 mg tablet Take 10 mg by mouth daily as needed for Allergies. felodipine (PLENDIL SR) 10 mg 24 hr tablet Take 1 Tab by mouth daily. Qty: 180 Tab, Refills: 5  
  
aspirin delayed-release 81 mg tablet Take 81 mg by mouth daily. haloperidol (HALDOL) 2 mg/mL oral concentrate Take 1 mL by mouth every six (6) hours as needed (agitation). Qty: 30 mL, Refills: 1 STOP taking these medications  
  
 losartan (COZAAR) 100 mg tablet Comments:  
Reason for Stopping:   
   
 furosemide (LASIX) 20 mg tablet Comments:  
Reason for Stopping:   
   
 guaiFENesin ER (MUCINEX) 600 mg ER tablet Comments:  
Reason for Stopping:   
   
 clopidogrel (PLAVIX) 75 mg tab Comments:  
Reason for Stopping:   
   
 cloNIDine HCl (CATAPRES) 0.2 mg tablet Comments:  
Reason for Stopping: COLACE 100 mg PO Twice daily DISCHARGE VITAL SIGNS: 
Visit Vitals /58 (BP 1 Location: Left arm, BP Patient Position: At rest) Pulse 78 Temp 98.6 °F (37 °C) Resp 18 Ht 4' 11\" (1.499 m) Wt 64 kg (141 lb) SpO2 96% Breastfeeding No  
BMI 28.48 kg/m² DISCHARGE PHYSICAL EXAMINATION: 
General:  Awake, pleasantly confused Cardiovascular:  S1S2+, RRR Pulmonary:  CTA b/l GI:  Soft, BS+, NT, ND Extremities:  No edema CONDITION ON DISCHARGE: Stable. DISPOSITION: SNF 
 
 
FOLLOW-UP RECOMMENDATIONS:  
Follow-up Information Follow up With Specialties Details Why Contact Info Michael Redd MD [facility]   [address] [place] [id number] 
[phone] OTHER INSTRUCTIONS: 
Diet- Cardiac, renal 
Activity - as tolerated FALL PRECAUTIONS 
ASPIRATION PRECAUTIONS DECUBITUS PRECAUTIONS Weight Bearing as tolerated R LE Incentive Spirometry Q30 minutes when awake PT, OT Evaluate and Treat Check CBC, CMP, Mg in 2 days Graded compression stockings b/l LE- use as directed SCDs b/l LE - use as directed F/u with PCP in 1 week F/u with nephrologist in 2 weeks F/u with Cardiologist in 2 weeks F/u with Orthopedics in 10 days TIME SPENT ON DISCHARGE ACTIVITIES: More than 35 minutes. Dragon medical dictation software was used for portions of this report. Unintended errors may occur. Signed:  Colton Aguila MD 
 
  3/4/2020

## 2020-03-04 NOTE — ROUTINE PROCESS
Report called to [facility] rehab [place] spoke with Λ[place]ΜΕΣΟΣ Rn  
1850-Report given to Radha Tinajero using the Kardex, SBAR, and MAR. IV d/cd no signs of infection noted. Armbands removed and shredded.

## 2020-03-04 NOTE — DISCHARGE INSTRUCTIONS
DISCHARGE SUMMARY from Nurse    PATIENT INSTRUCTIONS:    After general anesthesia or intravenous sedation, for 24 hours or while taking prescription Narcotics:  · Limit your activities  · Do not drive and operate hazardous machinery  · Do not make important personal or business decisions  · Do  not drink alcoholic beverages  · If you have not urinated within 8 hours after discharge, please contact your surgeon on call. Report the following to your surgeon:  · Excessive pain, swelling, redness or odor of or around the surgical area  · Temperature over 100.5  · Nausea and vomiting lasting longer than 4 hours or if unable to take medications  · Any signs of decreased circulation or nerve impairment to extremity: change in color, persistent  numbness, tingling, coldness or increase pain  · Any questions    What to do at Home:  Recommended activity: Activity as tolerated,     If you experience any of the following symptoms fever, chills, shortness of breath, please follow up with  md.    *  Please give a list of your current medications to your Primary Care Provider. *  Please update this list whenever your medications are discontinued, doses are      changed, or new medications (including over-the-counter products) are added. *  Please carry medication information at all times in case of emergency situations. These are general instructions for a healthy lifestyle:    No smoking/ No tobacco products/ Avoid exposure to second hand smoke  Surgeon General's Warning:  Quitting smoking now greatly reduces serious risk to your health.     Obesity, smoking, and sedentary lifestyle greatly increases your risk for illness    A healthy diet, regular physical exercise & weight monitoring are important for maintaining a healthy lifestyle    You may be retaining fluid if you have a history of heart failure or if you experience any of the following symptoms:  Weight gain of 3 pounds or more overnight or 5 pounds in a week, increased swelling in our hands or feet or shortness of breath while lying flat in bed. Please call your doctor as soon as you notice any of these symptoms; do not wait until your next office visit. The discharge information has been reviewed with the patient. The patient verbalized understanding. Discharge medications reviewed with the {Arthur meds reviewed [id number]} and appropriate educational materials and side effects teaching were provided.   ___________________________________________________________________________________________________________________________________

## 2020-03-04 NOTE — PROGRESS NOTES
2028 
Patient had 7 beats run of Vtach. MD Paged. [phone] Paged MD again. [phone] Paged MD again. Supervisor made aware. [phone] Paged MD again [phone] Call supervisor. Per supervisor, she will call the hospitalist. Paged hospitalist again. [phone] Per Dr. Deidra August, notify cardiologist.  
Paged cardiology. [phone] New order received: BMP & Magnesium per Renato FERRIS. 0203 Labs notified about the STAT order. 8220 Report given to AM nurse and aware to above situation. Care resume by AM nurse.

## 2020-03-04 NOTE — PROGRESS NOTES
Problem: Self Care Deficits Care Plan (Adult) Goal: *Acute Goals and Plan of Care (Insert Text) Description Occupational Therapy Goals Initiated 3/2/2020 within 7 day(s). 1.  Patient will perform functional activity seated EOB for 2-4 minutes with supervision/set-up and G balance. 2.  Patient will perform bed mobility with supervision/set-up. 3.  Patient will perform self-feeding with supervision/set-up. 4.  Patient will perform toilet transfers with minimal assistance/contact guard assist. 
 
Prior Level of Function: Patient needed assist with self-care and used a standard walker for functional mobility PTA. Patient caregiver (present in room) reports she does all ADLs, however patient able to self-feed \"on good days\" and doff clothing (when agitated). Caregiver present 13 hours/day during the day when patient's grandson is not home. Outcome: Progressing Towards Goal 
 OCCUPATIONAL THERAPY TREATMENT Patient: Drew Do (27 y.o. female) Date: 3/4/2020 Diagnosis: Closed right hip fracture, initial encounter ([facility] [address] Alex Chin Hyperkalemia [E87.5] Chronic kidney disease [N18.9] Elevated troponin [R79.89] <principal problem not specified> Procedure(s) (LRB): HIP HEMIARTHROPLASTY (Right) 3 Days Post-Op Precautions: Fall, WBAT, Total hip(RLE, michelle-arthroplasty) Chart, occupational therapy assessment, plan of care, and goals were reviewed. ASSESSMENT: 
Pt sleeping upon entry. Arouses to voice. Caregiver, Sharon, at bedside. Pt co-treated w/PT to maximize safety w/functional transfer training. Generalized weakness and decondition require 2 person assist w/bed mobility to maneuver to EOB. Pt tolerates sitting EOB ~ 10 minutes performing simple ADL grooming tasks w/hand over hand assist. Complex grooming task requires assist w/toothpaste mgt 2/2 poor FM strength/coordination.  Poor safety awareness requires 2 person assist w/functional transfer to standing w/RW in prep for functional transfer training BSC/toilet. Pt's cognitive deficits requires 24 hr supervision for safety and assist w/ADLs. Pt will benefit from SNF stay to increase activity tolerance w/ADLs and functional mobility/transfers to minimize burden on caregiver. Progression toward goals: 
[]          Improving appropriately and progressing toward goals [x]          Improving slowly and progressing toward goals 
[]          Not making progress toward goals and plan of care will be adjusted PLAN: 
Patient continues to benefit from skilled intervention to address the above impairments. Continue treatment per established plan of care. Discharge Recommendations:  Chris Lara Further Equipment Recommendations for Discharge:  possibly wheelchair for community re-entry SUBJECTIVE:  
Patient stated ok.  OBJECTIVE DATA SUMMARY:  
Cognitive/Behavioral Status: 
Neurologic State: Confused Orientation Level: Oriented to person Cognition: Decreased attention/concentration, Decreased command following Safety/Judgement: Fall prevention, Decreased insight into deficits Functional Mobility and Transfers for ADLs: 
 Bed Mobility: 
 
Supine to Sit: Maximum assistance;Assist x2 (w/HOB raised and SR) Scooting: Maximum assistance;Assist x2 Transfers: 
Sit to Stand: Moderate assistance;Assist x2 w/RW Bed to Chair: Moderate assistance;Assist x2(w/RW) Balance: 
Sitting: Impaired; With support Sitting - Static: Fair (occasional)(to fair +) Sitting - Dynamic: Fair (occasional) Standing: Impaired; With support Standing - Static: Poor Standing - Dynamic : Poor ADL Intervention: 
Grooming Position Performed: Seated edge of bed Washing Face: Maximum assistance(w/hand over hand assist) Washing Hands: Maximum assistance(w/hand over hand assist) Brushing Teeth: Moderate assistance(requires assist w/toothpaste mgt) Pain: 
Pain level pre-treatment: 0/10 Pain level post-treatment: 0/10 Activity Tolerance:   
Fair Please refer to the flowsheet for vital signs taken during this treatment. After treatment:  
[x]  Patient left in no apparent distress sitting up in chair 
[]  Patient left in no apparent distress in bed 
[]  Call bell left within reach [x]  Nursing, Jadon Mcclelland, notified 
[x]  Caregiver present 
[]  Bed alarm activated COMMUNICATION/EDUCATION:  
[] Role of Occupational Therapy in the acute care setting 
[] Home safety education was provided and the patient/caregiver indicated understanding. [] Patient/family have participated as able in working towards goals and plan of care. [x] Caregiver agree to work toward stated goals and plan of care. [] Patient understands intent and goals of therapy, but is neutral about his/her participation. [] Patient is unable to participate in goal setting and plan of care. Thank you for this referral. 
ANNA Metz Time Calculation: 25 mins

## 2020-03-06 NOTE — TELEPHONE ENCOUNTER
Sharon from [address] [place] [address] Rehab called for Stanley Bravo. Sharon would like to know if the patient is supposed to keep the Pad in between her legs or not. That they did not get any instructions. Sharon tel. [phone]. Note : sx done by Dr. Roberth Stone for the Right Hip on 03/01/2020.

## 2020-03-16 NOTE — PROGRESS NOTES
HISTORY OF PRESENT ILLNESS:  Rocio Juarez returns with her daughter. The patient is status post fall with a right hip hemiarthroplasty placement. She is doing generally well. She is a resident of [facility]. PHYSICAL EXAM:  The right hip is examined today to reveal in a cranial/caudal orientation of the lateral proximal hip a 15-centimeter surgical incision intact. The wound borders are well approximated. Surgical staples are noted. RADIOGRAPHS:  X-rays, today, AP of the right hip and a cross table lateral reveal a hemiarthroplasty intact with anatomical alignment. There is no evidence of periprosthetic fracturing or dislocation. PROCEDURE:  Today, all staples were removed with clean technique, and Steri-Strips were placed with a sterile top cover. PLAN:   The patient may return to the office in one month for x-rays.

## 2020-04-07 NOTE — TELEPHONE ENCOUNTER
Sharon, patient's private duty caretaker, called to inform Christa Manzano NP, that she is holding the patient's Furosemide 20 mg today. She decided not to give her the dose because patient \"is not drinking as much and not feeling well\". Patient is recovering from hip surgery. Sharon informs that patient has no swelling, no wheezing, no fever, and is urinating fine since being on Flomax. The patient has occasional cough that she says are \"allergies\". Sharon would like to know how long she can \"hold\" the patient's furosemide and can be called at [phone].

## 2020-04-09 NOTE — TELEPHONE ENCOUNTER
Called Sharon at [phone] and per Verbal order and read back per Freedom Koehler, NP instructed her to give Furosemide 20 mg po every other day. Sharon, caretaker, voiced understanding and will watch for dehydration or edema. Meds reconciled as discrepancy noted: Sharon reports that patient saw Dr Sergio Richmond on 04/03/20 via Virtual Visit. She has had carvedilol discontinued, haldol discontinued. She is now on thickener. Advised Sharon to call Dr Aliza Cerda and verify new dosage of Furosemide every other day. Understanding verified.

## 2020-04-13 NOTE — TELEPHONE ENCOUNTER
Zac Cherry called back and the message was given but Zac Cherry stated that the patient's right leg is shorter than the left and has home health physical therapy is coming out to her house. She is wondering if this should be concerning for one leg to be shorter than the other.  Please advise Zac Cherry at [phone]

## 2020-04-13 NOTE — TELEPHONE ENCOUNTER
Post op rt hip    Palma Short is her caregiver and in-law of this patient. Requesting to speak with rivas ni. States dr Ronnie Davis did rt hip sx. Reports the patient's right leg is drawn up and she is concerned.     Please advise         [phone]

## 2020-04-13 NOTE — TELEPHONE ENCOUNTER
Called and left message. She was last seen by BARRINGTON Banks so if she calls back he may be able to better answer her questions since he saw her in the office. We can order some H/H PT to work on her ROM but its difficult to say why her leg is \"drawn up\" without seeing her. I would most likely order H/H PT to avoid exposing her in the office at this time.

## 2020-04-13 NOTE — TELEPHONE ENCOUNTER
Thank you for the question. As long as the surgical leg is not painful or dislocated then the concern is important but not critical to worry about. With a new hip in on the fracture side the leg is now longer do to the anatomy being restored (length) when compared to age related shortening on the none surgical leg.

## 2020-04-24 NOTE — PROGRESS NOTES
Ralf Moscoso is a 80 y.o. female who was seen by synchronous (real-time) audio-video technology on 4/24/2020. Consent: Ralf Moscoso, who was seen by synchronous (real-time) audio-video technology, and/or her healthcare decision maker, is aware that this patient-initiated, Telehealth encounter on 4/24/2020 is a billable service, with coverage as determined by her insurance carrier. She is aware that she may receive a bill and has provided verbal consent to proceed: Yes. Pt seen via DOXY by help of her caregiver Sharon, from my home office, for hospital follow up after a fall/ hip fx Assessment & Plan:  
 
-s/p mechanical fall 2/29/20 with right hip hemiarthroplasty 3/1/20 by Dr. Cirilo Love 
-Indeterminate troponin, not consistent with primary ACS.  Seen January 2020 in setting of UTI with conservative management at that time  
-Presumed CAD history 
-Chronic Plavix/ASA use 
-Chronic RBBB 
-Chronic diastolic heart failure on lasix 20 mg as OP. Robyn Niharika 8/2019 EF 55%, moderate asymmetric LVH 
-h/o moderate pulmonary HTN at 54 mmHg by echo 8/2019 
-h/o HTN 
-Remote stroke 
-h/o Alzheimers dementia 
-HLD on statin 
-CKD stage III 
-DNR status 
 
-Continue lasix every other day 
- Continue daily vitals, SBP well controlled - Aware of decline in functional status since fall 
- results of inpt echo again dw pt and caregiver, all questions answered 
-Stable from CV standpoint, RTC 6 months with NP and yearly with me I spent at least 25 minutes on this visit with this established patient. Subjective:  
Ralf Moscoso is a 80 y.o. female who was seen for follow up h/o HFpEF, presumed CAD, RBBB, CKD, abn trop, hospital follow after fall and hip fx. Hospital records reviewed, incl inpt echo. No changes from CV standpoint, and no CV complaints today. No LE edema, no change in weight, all vitals stable per caregiver who checks daily. Main issue is caregiver says pt has had major decline in functional status since fall. Claims she was walking prior, and now cant get up on her own. Compounded with her dementia she gets confused and getting her up is even harder now for the caregiver. She takes her Lasix every other day. Prior to Admission medications Medication Sig Start Date End Date Taking? Authorizing Provider  
starch, thickening, (Thick Now) powd Take  by mouth. Provider, Historical  
amLODIPine (NORVASC) 5 mg tablet Take 5 mg by mouth daily. Provider, Historical  
HYDROcodone-acetaminophen (Norco) 5-325 mg per tablet Take 1 Tab by mouth every eight (8) hours as needed for Pain. Provider, Historical  
QUEtiapine (SEROqueL) 25 mg tablet Take 25 mg by mouth nightly. Provider, Historical  
zolpidem (Ambien) 10 mg tablet Take  by mouth nightly as needed for Sleep. Provider, Historical  
furosemide (LASIX) 20 mg tablet Take 20 mg by mouth every other day. Provider, Historical  
tamsulosin (FLOMAX) 0.4 mg capsule Take 1 Cap by mouth daily. 3/5/20   Carlos Allen MD  
docusate sodium (COLACE) 100 mg capsule Take 1 Cap by mouth two (2) times a day. 3/4/20   Carlos Allen MD  
Omeprazole delayed release (PRILOSEC D/R) 20 mg tablet Take 20 mg by mouth daily. Indications: gastroesophageal reflux disease    Provider, Historical  
lactobacillus sp. 50 billion cpu (BIO-K PLUS) 50 billion cell -375 mg cap capsule Take 1 Cap by mouth daily. 1/10/20   José Christianson MD  
ergocalciferol (ERGOCALCIFEROL) 50,000 unit capsule TAKE 1 CAPSULE WEEKLY 11/18/19   Provider, Historical  
vitamin e (E GEMS) 1,000 unit capsule Take 1,000 Units by mouth daily. Provider, Historical  
ferrous sulfate (SLOW FE) 142 mg (45 mg iron) ER tablet Take  by mouth Daily (before breakfast). Provider, Historical  
folic acid/multivit-min/lutein (CENTRUM SILVER PO) Take 1 Tab by mouth daily.     Provider, Historical  
 allopurinol (ZYLOPRIM) 100 mg tablet Take 100 mg by mouth every Monday and Friday. Provider, Historical  
atorvastatin (LIPITOR) 20 mg tablet Take 20 mg by mouth daily. Provider, Historical  
loratadine (CLARITIN) 10 mg tablet Take 10 mg by mouth daily as needed for Allergies. Provider, Historical  
felodipine (PLENDIL SR) 10 mg 24 hr tablet Take 1 Tab by mouth daily. 8/3/12   Piter Lan MD  
aspirin delayed-release 81 mg tablet Take 81 mg by mouth daily. Provider, Historical  
 
Allergies Allergen Reactions  Shellfish Derived Hives and Nausea and Vomiting ROS- 14 pt neg except HPI Objective:  
Vital Signs: (As obtained by patient/caregiver at home) There were no vitals taken for this visit. Constitutional: [x] Appears well-developed and well-nourished [x] No apparent distress   
  [] Abnormal - Mental status: [x] Alert and awake  [x] Oriented to person/place/time [x] Able to follow commands   
[] Abnormal - Eyes:   EOM    [x]  Normal    [] Abnormal -  
Sclera  [x]  Normal    [] Abnormal - 
        Discharge [x]  None visible   [] Abnormal - HENT: [x] Normocephalic, atraumatic  [] Abnormal -  
[x] Mouth/Throat: Mucous membranes are moist 
 
External Ears [x] Normal  [] Abnormal - Neck: [x] No visualized mass [] Abnormal - Pulmonary/Chest: [x] Respiratory effort normal   [x] No visualized signs of difficulty breathing or respiratory distress 
      [] Abnormal - Musculoskeletal:   [x] Normal gait with no signs of ataxia [x] Normal range of motion of neck [] Abnormal -  
 
Neurological:        [x] No Facial Asymmetry (Cranial nerve 7 motor function) (limited exam due to video visit) [x] No gaze palsy  
     [] Abnormal -   
     
Skin:        [x] No significant exanthematous lesions or discoloration noted on facial skin   
     [] Abnormal - Psychiatric:       [x] Normal Affect [] Abnormal -  
     [x] No Hallucinations We discussed the expected course, resolution and complications of the diagnosis(es) in detail. Medication risks, benefits, costs, interactions, and alternatives were discussed as indicated. I advised her to contact the office if her condition worsens, changes or fails to improve as anticipated. She expressed understanding with the diagnosis(es) and plan. Estela Wright is a 80 y.o. female who was evaluated by a video visit encounter for concerns as above. Patient identification was verified prior to start of the visit. A caregiver was present when appropriate. Due to this being a TeleHealth encounter (During NAUBH-01 public health emergency), evaluation of the following organ systems was limited: Vitals/Constitutional/EENT/Resp/CV/GI//MS/Neuro/Skin/Heme-Lymph-Imm. Pursuant to the emergency declaration under the [facility], 1135 waiver authority and the [employer] Act, this Virtual  Visit was conducted, with patient's (and/or legal guardian's) consent, to reduce the patient's risk of exposure to COVID-19 and provide necessary medical care. Services were provided through a video synchronous discussion virtually to substitute for in-person clinic visit. Patient and provider were located at their individual homes.  
 
 
Garima Alston DO

## 2020-06-09 NOTE — TELEPHONE ENCOUNTER
Patient and care giver called stated patient has been experiencing wheezing with any position changes. Patient has no other symptoms to report no swelling, no chest discomfort, no nausea, no dizziness. Patient is unable to weight daily, not able to stand. Verbal order and read back per Marielos Mancia MD  Increase lasix to 20mg once daily. This has been fully explained to the patient, who indicates understanding.

## 2020-06-11 NOTE — PROGRESS NOTES
Hipolito Toure Chief Complaint Patient presents with  CHF  
  1 YEAR  Wheezing 2 days HPI Hipolito Toure is a 80 y.o. AAF with HFpEF, HTN, H/o TIA/ CVA, CKD3 here for routine follow up and to establish her cardiovascular care. She was hospitalized from 6/5/19 through 6/11/19 for acute diastolic heart failure and acute hypoxic respiratory failure requiring Bipap. She presented with complaints of shortness of breath. Her NT pro-BNP was 25,638. She was given IV lasix and her symptoms improved. Her troponins were mildly elevated and peaked at 1.10 and was felt to be due to demand ischemia. After being diuresed, she had 3.5 liters negative balance. She had an echo done and it showed an EF of 55%, moderate TR, and PASP of 52 mmHg. She also had a left pleural effusion. Lower extremity duplex study was negative for DVT.   
  
She is a DNR. She was not followed by cardiology (in office) prior to this appointment although cardiology has seen her during her other hospitalizations and established with me last fall. Since then unfortunately she had some type of mechanical fall at home and broke her right hip. She was hospitalized in March 2020 and has had a major decline in her functional status since then (per her caregiver). She has dementia and is getting very confused, cant get up on her own. They called for an urgent appt due to \"wheezing. \" 
 
Past Medical History:  
Diagnosis Date  Anemia NEC  Anxiety  Cataract   
 bilat  Colon cancer ([facility] [address] 1999  CRI (chronic renal insufficiency) 4/07  CVA (cerebral infarction) 4/01  
 right facial droop  CVA (cerebral infarction) 2/11  
 left post corona radiata  Dementia ([facility] [address]  Diverticulosis  Edema  GERD (gastroesophageal reflux disease)  Hypercholesterolemia  Hypertension  Hypomagnesemia 2/11  Lipoma of neck   
 right  Orthostatic hypotension  RBBB (right bundle branch block)  Syncope 6/08  TIA (transient ischemic attack) mid 1990's Past Surgical History:  
Procedure Laterality Date  ENDOSCOPY, COLON, DIAGNOSTIC  2006  HX BREAST BIOPSY  HX CATARACT REMOVAL  10/02  
 left  HX CATARACT REMOVAL  6/03  
 right  HX COLECTOMY  1999  
 right hemicolectomy  HX HYSTERECTOMY  HX LIPOMA RESECTION Current Outpatient Medications Medication Sig Dispense Refill  starch, thickening, (Thick Now) powd Take  by mouth.  amLODIPine (NORVASC) 5 mg tablet Take 5 mg by mouth daily.  HYDROcodone-acetaminophen (Norco) 5-325 mg per tablet Take 1 Tab by mouth every eight (8) hours as needed for Pain.  QUEtiapine (SEROqueL) 25 mg tablet Take 25 mg by mouth nightly.  zolpidem (Ambien) 10 mg tablet Take  by mouth nightly as needed for Sleep.  furosemide (LASIX) 20 mg tablet Take 20 mg by mouth daily.  Omeprazole delayed release (PRILOSEC D/R) 20 mg tablet Take 20 mg by mouth daily. Indications: gastroesophageal reflux disease  lactobacillus sp. 50 billion cpu (BIO-K PLUS) 50 billion cell -375 mg cap capsule Take 1 Cap by mouth daily. 5 Cap 0  
 ergocalciferol (ERGOCALCIFEROL) 50,000 unit capsule TAKE 1 CAPSULE WEEKLY  5  
 vitamin e (E GEMS) 1,000 unit capsule Take 1,000 Units by mouth daily.  ferrous sulfate (SLOW FE) 142 mg (45 mg iron) ER tablet Take  by mouth Daily (before breakfast).  folic acid/multivit-min/lutein (CENTRUM SILVER PO) Take 1 Tab by mouth daily.  allopurinol (ZYLOPRIM) 100 mg tablet Take 100 mg by mouth every Monday and Friday.  atorvastatin (LIPITOR) 20 mg tablet Take 20 mg by mouth daily.  loratadine (CLARITIN) 10 mg tablet Take 10 mg by mouth daily as needed for Allergies.  aspirin delayed-release 81 mg tablet Take 81 mg by mouth daily.  tamsulosin (FLOMAX) 0.4 mg capsule Take 1 Cap by mouth daily.  30 Cap 0  
  docusate sodium (COLACE) 100 mg capsule Take 1 Cap by mouth two (2) times a day. 60 Cap 0  
 felodipine (PLENDIL SR) 10 mg 24 hr tablet Take 1 Tab by mouth daily. 180 Tab 5 Allergies Allergen Reactions  Shellfish Derived Hives and Nausea and Vomiting Social History Socioeconomic History  Marital status:  Spouse name: Not on file  Number of children: Not on file  Years of education: Not on file  Highest education level: Not on file Occupational History  Not on file Social Needs  Financial resource strain: Not on file  Food insecurity Worry: Not on file Inability: Not on file  Transportation needs Medical: Not on file Non-medical: Not on file Tobacco Use  Smoking status: Never Smoker  Smokeless tobacco: Never Used Substance and Sexual Activity  Alcohol use: No  
 Drug use: Not on file  Sexual activity: Never Lifestyle  Physical activity Days per week: Not on file Minutes per session: Not on file  Stress: Not on file Relationships  Social connections Talks on phone: Not on file Gets together: Not on file Attends Mandaeism service: Not on file Active member of club or organization: Not on file Attends meetings of clubs or organizations: Not on file Relationship status: Not on file  Intimate partner violence Fear of current or ex partner: Not on file Emotionally abused: Not on file Physically abused: Not on file Forced sexual activity: Not on file Other Topics Concern  Not on file Social History Narrative  Not on file FH: n/a Review of Systems 14 pt Review of Systems is negative unless otherwise mentioned in the HPI. Wt Readings from Last 3 Encounters:  
03/02/20 64 kg (141 lb) 01/08/20 60.5 kg (133 lb 4.8 oz) 12/19/19 57.6 kg (127 lb) Temp Readings from Last 3 Encounters:  
03/16/20 96.8 °F (36 °C) (Oral) 03/04/20 98.3 °F (36.8 °C)  
01/09/20 96.9 °F (36.1 °C) BP Readings from Last 3 Encounters:  
06/11/20 142/58  
03/16/20 139/83  
03/04/20 120/57 Pulse Readings from Last 3 Encounters:  
06/11/20 73  
03/16/20 60  
03/04/20 88  
 
 
06/05/19 ECHO ADULT COMPLETE 06/06/2019 6/6/2019 Narrative · Technically difficult due to limited patient compliance. · Left Ventricle: Moderate septal asymmetric hypertrophy. Calculated left  
ventricular ejection fraction is 55%. Visually measured ejection fraction. No regional wall motion abnormality noted. Inconclusive left ventricular  
diastolic function. · IVC/Hepatic Veins: Mildly elevated central venous pressure (5-10 mmHg); IVC diameter is less than 21 mm and collapses less than 50% with  
respiration. · Tricuspid Valve: Moderate tricuspid valve regurgitation is present. · Pulmonary Artery: Moderate pulmonary hypertension. PASP 52mmHg · Left Atrium: Severely dilated left atrium. · Pericardium: There is left pleural effusion. Signed by: Anisa White MD  
 
 
Physical Exam:   
Visit Vitals /58 Pulse 73 Ht 4' 11\" (1.499 m) SpO2 97% BMI 28.48 kg/m²  
  
exm limited due to pt in wheelchair, hunged over sleeping- We woke her up and was able to move her forward to auscultate back Physical Exam  
Constitutional: She is oriented to person, place, and time. She appears well-developed and well-nourished. HENT:  
Head: Normocephalic and atraumatic. Eyes: Pupils are equal, round, and reactive to light. EOM are normal.  
Neck:  
Unable to appreciate JVP, Cardiovascular: Normal rate, regular rhythm and intact distal pulses. Exam reveals no gallop and no friction rub. Murmur heard. Pulmonary/Chest: Effort normal and breath sounds normal. No respiratory distress. She has no wheezes. She has no rales. She exhibits no tenderness. Abdominal: Soft. Bowel sounds are normal.  
Musculoskeletal:     
   General: No tenderness or edema. Neurological: She is alert and oriented to person, place, and time. Skin: Skin is warm and dry. Psychiatric: She has a normal mood and affect. EKG today shows: Sinus jenny, RBBB Lab Results Component Value Date/Time Sodium 138 03/04/2020 07:04 AM  
 Potassium 4.0 03/04/2020 07:04 AM  
 Chloride 109 03/04/2020 07:04 AM  
 CO2 24 03/04/2020 07:04 AM  
 Anion gap 5 03/04/2020 07:04 AM  
 Glucose 94 03/04/2020 07:04 AM  
 BUN 37 (H) 03/04/2020 07:04 AM  
 Creatinine 1.73 (H) 03/04/2020 07:04 AM  
 BUN/Creatinine ratio 21 (H) 03/04/2020 07:04 AM  
 GFR est AA 33 (L) 03/04/2020 07:04 AM  
 GFR est non-AA 27 (L) 03/04/2020 07:04 AM  
 Calcium 7.9 (L) 03/04/2020 07:04 AM  
 Bilirubin, total 0.5 02/29/2020 06:38 PM  
 Alk. phosphatase 86 02/29/2020 06:38 PM  
 Protein, total 7.5 02/29/2020 06:38 PM  
 Albumin 2.3 (L) 03/03/2020 12:46 PM  
 Globulin 4.5 (H) 02/29/2020 06:38 PM  
 A-G Ratio 0.7 (L) 02/29/2020 06:38 PM  
 ALT (SGPT) 28 02/29/2020 06:38 PM  
 
 
 
Impression and Plan: 
Maverick Stewart is a 80 y.o. with: 
 
1.) HFpEF, stable/ well compensated 2.) Moderate TR/ Pulm HTN, known 
3.) HTN 
4.) H/o CVA and Alz Dementia with advanced age, known 
5.) CKD3 
6.) RBBB 
7.) Presumed CAD 
8.) DNR 
 
1.) Continue current med regimen, includes daily lasix 2.) Continue close monitoring of fluid status 3.) RTC ~4 months with NP 
4.) RTC ~6-8 months with me HFpEF Will be at risk for fluid overload due to TR/ RHF/ HFpEF and poor functional status But encouraged to continue good appetite with close monitoring Has such good supportive care with family/ caregivers Did ask if wheezing continues to dw PCP 
 
>30 mins spent with CHF education, all questions answered Thank you for allowing me to participate in the care of your patient, please do not hesitate to call with questions or concerns. Kind Regards, Gi Cartagena, DO

## 2020-06-13 PROBLEM — I50.9 CONGESTIVE HEART FAILURE (CHF) (HCC): Status: ACTIVE | Noted: 2020-01-01

## 2020-06-13 NOTE — ED PROVIDER NOTES
EMERGENCY DEPARTMENT HISTORY AND PHYSICAL EXAM 
 
2:48 PM 
 
 
Date: 6/13/2020 Patient Name: Cordell Vela History of Presenting Illness Chief Complaint Patient presents with  Shortness of Breath History Provided By: caregiver at bedside. Patient with dementia. Chief complaint: SOB since Tuesday. Duration: 4 days Timing: constant Location: chest  
Quality: family note some wheezing intermittently. None at this time. Severity: n/a Modifying factors: none Associated Symptoms: difficult to determine. She has dementia. Family and private aide at bedside states no fevers, no chills. No vomiting or diarrhea. Additional History (Context): Cordell Vela is a 80 y.o. female with past medical history significant for diastolic heart failure, dyslipidemia, dementia, GERD, remote CVA. PCP: Loyda Mc MD 
 
Current Facility-Administered Medications Medication Dose Route Frequency Provider Last Rate Last Dose  heparin (porcine) 1,000 unit/mL injection 3,840 Units  60 Units/kg IntraVENous ONCE Sree Irwin, NP      
 heparin 25,000 units in D5W 250 ml infusion  12-25 Units/kg/hr IntraVENous TITRATE Katia Lange NP      
 furosemide (LASIX) injection 20 mg  20 mg IntraVENous ONCE Katia Lange NP Current Outpatient Medications Medication Sig Dispense Refill  starch, thickening, (Thick Now) powd Take  by mouth.  amLODIPine (NORVASC) 5 mg tablet Take 5 mg by mouth daily.  HYDROcodone-acetaminophen (Norco) 5-325 mg per tablet Take 1 Tab by mouth every eight (8) hours as needed for Pain.  QUEtiapine (SEROqueL) 25 mg tablet Take 25 mg by mouth nightly.  zolpidem (Ambien) 10 mg tablet Take  by mouth nightly as needed for Sleep.  furosemide (LASIX) 20 mg tablet Take 20 mg by mouth daily.  tamsulosin (FLOMAX) 0.4 mg capsule Take 1 Cap by mouth daily.  30 Cap 0  
  docusate sodium (COLACE) 100 mg capsule Take 1 Cap by mouth two (2) times a day. 60 Cap 0  
 Omeprazole delayed release (PRILOSEC D/R) 20 mg tablet Take 20 mg by mouth daily. Indications: gastroesophageal reflux disease  lactobacillus sp. 50 billion cpu (BIO-K PLUS) 50 billion cell -375 mg cap capsule Take 1 Cap by mouth daily. 5 Cap 0  
 ergocalciferol (ERGOCALCIFEROL) 50,000 unit capsule TAKE 1 CAPSULE WEEKLY  5  
 vitamin e (E GEMS) 1,000 unit capsule Take 1,000 Units by mouth daily.  ferrous sulfate (SLOW FE) 142 mg (45 mg iron) ER tablet Take  by mouth Daily (before breakfast).  folic acid/multivit-min/lutein (CENTRUM SILVER PO) Take 1 Tab by mouth daily.  allopurinol (ZYLOPRIM) 100 mg tablet Take 100 mg by mouth every Monday and Friday.  atorvastatin (LIPITOR) 20 mg tablet Take 20 mg by mouth daily.  loratadine (CLARITIN) 10 mg tablet Take 10 mg by mouth daily as needed for Allergies.  felodipine (PLENDIL SR) 10 mg 24 hr tablet Take 1 Tab by mouth daily. 180 Tab 5  
 aspirin delayed-release 81 mg tablet Take 81 mg by mouth daily. Past History Past Medical History: 
Past Medical History:  
Diagnosis Date  Anemia NEC  Anxiety  Cataract   
 bilat  Colon cancer ([facility] [address] 1999  CRI (chronic renal insufficiency) 4/07  CVA (cerebral infarction) 4/01  
 right facial droop  CVA (cerebral infarction) 2/11  
 left post corona radiata  Dementia ([facility] [address]  Diverticulosis  Edema  GERD (gastroesophageal reflux disease)  Hypercholesterolemia  Hypertension  Hypomagnesemia 2/11  Lipoma of neck   
 right  Orthostatic hypotension  RBBB (right bundle branch block)  Syncope 6/08  TIA (transient ischemic attack) mid 1990's Past Surgical History: 
Past Surgical History:  
Procedure Laterality Date  ENDOSCOPY, COLON, DIAGNOSTIC  2006  HX BREAST BIOPSY  HX CATARACT REMOVAL  10/02  
 left  HX CATARACT REMOVAL  6/03  
 right  HX COLECTOMY  1999  
 right hemicolectomy  HX HYSTERECTOMY  HX LIPOMA RESECTION Family History: 
History reviewed. No pertinent family history. Social History: 
Social History Tobacco Use  Smoking status: Never Smoker  Smokeless tobacco: Never Used Substance Use Topics  Alcohol use: No  
 Drug use: Not on file Allergies: Allergies Allergen Reactions  Shellfish Derived Hives and Nausea and Vomiting Review of Systems Review of Systems Unable to perform ROS: Dementia Physical Exam  
 
Visit Vitals /59 Pulse 83 Temp 98.4 °F (36.9 °C) Resp (!) 33 Wt 64 kg (141 lb) SpO2 93% BMI 28.48 kg/m² Physical Exam 
Vitals signs and nursing note reviewed. Constitutional:   
   General: She is not in acute distress. Appearance: Normal appearance. She is not ill-appearing, toxic-appearing or diaphoretic. HENT:  
   Head: Normocephalic and atraumatic. Neck: Musculoskeletal: Normal range of motion and neck supple. No neck rigidity or muscular tenderness. Cardiovascular:  
   Rate and Rhythm: Normal rate and regular rhythm. Pulses: Normal pulses. Heart sounds: Normal heart sounds. No murmur. Pulmonary:  
   Effort: Pulmonary effort is normal. No respiratory distress. Breath sounds: Examination of the right-lower field reveals rhonchi. Examination of the left-lower field reveals rhonchi. Decreased breath sounds and rhonchi present. No wheezing. Abdominal:  
   General: Bowel sounds are normal. There is no distension. Palpations: Abdomen is soft. Tenderness: There is no abdominal tenderness. There is no right CVA tenderness or left CVA tenderness. Musculoskeletal: Normal range of motion. Right lower leg: No edema. Left lower leg: No edema. Skin: 
   General: Skin is warm and dry. Capillary Refill: Capillary refill takes less than 2 seconds. Neurological:  
   Mental Status: She is alert. She is disoriented. Psychiatric:     
   Behavior: Behavior normal.  
 
 
Diagnostic Study Results Labs - Recent Results (from the past 12 hour(s)) CBC WITH AUTOMATED DIFF Collection Time: 06/13/20  1:05 PM  
Result Value Ref Range WBC 7.1 4.6 - 13.2 K/uL  
 RBC 3.73 (L) 4.20 - 5.30 M/uL HGB 9.5 (L) 12.0 - 16.0 g/dL HCT 29.8 (L) 35.0 - 45.0 % MCV 79.9 74.0 - 97.0 FL  
 MCH 25.5 24.0 - 34.0 PG  
 MCHC 31.9 31.0 - 37.0 g/dL  
 RDW 15.8 (H) 11.6 - 14.5 % PLATELET 873 149 - 145 K/uL MPV 10.0 9.2 - 11.8 FL  
 NEUTROPHILS 78 (H) 40 - 73 % LYMPHOCYTES 12 (L) 21 - 52 % MONOCYTES 10 3 - 10 % EOSINOPHILS 0 0 - 5 % BASOPHILS 0 0 - 2 %  
 ABS. NEUTROPHILS 5.6 1.8 - 8.0 K/UL  
 ABS. LYMPHOCYTES 0.8 (L) 0.9 - 3.6 K/UL  
 ABS. MONOCYTES 0.7 0.05 - 1.2 K/UL  
 ABS. EOSINOPHILS 0.0 0.0 - 0.4 K/UL  
 ABS. BASOPHILS 0.0 0.0 - 0.1 K/UL  
 DF AUTOMATED METABOLIC PANEL, COMPREHENSIVE Collection Time: 06/13/20  1:05 PM  
Result Value Ref Range Sodium 140 136 - 145 mmol/L Potassium 5.3 3.5 - 5.5 mmol/L Chloride 108 100 - 111 mmol/L  
 CO2 24 21 - 32 mmol/L Anion gap 8 3.0 - 18 mmol/L Glucose 122 (H) 74 - 99 mg/dL BUN 30 (H) 7.0 - 18 MG/DL Creatinine 1.97 (H) 0.6 - 1.3 MG/DL  
 BUN/Creatinine ratio 15 12 - 20 GFR est AA 28 (L) >60 ml/min/1.73m2 GFR est non-AA 23 (L) >60 ml/min/1.73m2 Calcium 9.2 8.5 - 10.1 MG/DL Bilirubin, total 0.9 0.2 - 1.0 MG/DL  
 ALT (SGPT) 42 13 - 56 U/L  
 AST (SGOT) 60 (H) 10 - 38 U/L Alk. phosphatase 85 45 - 117 U/L Protein, total 7.8 6.4 - 8.2 g/dL Albumin 3.0 (L) 3.4 - 5.0 g/dL Globulin 4.8 (H) 2.0 - 4.0 g/dL A-G Ratio 0.6 (L) 0.8 - 1.7    
TROPONIN I Collection Time: 06/13/20  1:05 PM  
Result Value Ref Range Troponin-I, QT 1.57 (HH) 0.0 - 0.045 NG/ML  
NT-PRO BNP Collection Time: 06/13/20  1:05 PM  
Result Value Ref Range NT pro-,050 (H) 0 - 1,800 PG/ML  
EKG, 12 LEAD, INITIAL Collection Time: 06/13/20  2:14 PM  
Result Value Ref Range Ventricular Rate 79 BPM  
 Atrial Rate 41 BPM  
 QRS Duration 110 ms  
 Q-T Interval 394 ms QTC Calculation (Bezet) 451 ms Calculated R Axis 80 degrees Calculated T Axis -45 degrees Diagnosis Accelerated Junctional rhythm Incomplete right bundle branch block ST & T wave abnormality, consider lateral ischemia Abnormal ECG When compared with ECG of 29-FEB-2020 12:01, 
Junctional rhythm has replaced Sinus rhythm T wave inversion less evident in Lateral leads Radiologic Studies -  
XR CHEST PORT Final Result IMPRESSION:   
  
Left lower lung opacity consistent with left lower lobe atelectasis versus  
airspace disease in addition to small pleural effusion as discussed above. Small  
right pleural effusion versus thickening. Moderately enlarged cardiac silhouette, slightly larger compared to prior. Medical Decision Making I am the first provider for this patient. I reviewed the vital signs, available nursing notes, past medical history, past surgical history, family history and social history. Vital Signs-Reviewed the patient's vital signs. Records Reviewed: Nursing Notes and Old Medical Records (Time of Review: 2:48 PM) ED Course: Progress Notes, Reevaluation, and Consults: 
 
Provider Notes (Medical Decision Making): This is a 68-year-old female who presented to the ED with chief complaint of shortness of breath. Patient is pleasantly demented,   unable to participate in her care. she denies any chest pain or discomfort. Denies any SOB. Private aide at bedside notes shortness of breath ongoing x5 days. No other associated symptoms. ER work-up pertinent positives as follows: + Acute ECG changes concerning for ischemia/ACS. Troponin 1.57.  proBNP over 104K, crt 1.97. Chest x-ray concerning for heart failure. Patient was initiated on heparin drip. Received IV Lasix push, ASA. Discussed HPI, labs, imaging findings with cardiology and hospitalist team.  Hospitalist in agreement to admit. Cardiology in agreement with current management. Gabe Ludwig [phone] is power of . Patient is a DNI DNR. He is in agreement with plan of care as outlined. Diagnosis Clinical Impression: 1. ACS (acute coronary syndrome) ([facility] [address] 2. Acute diastolic heart failure ([facility] [address] 3. [facility]ODORO (acute kidney injury) ([facility] Disposition: Admit to Hospitalist services, cardiology consulted. Follow-up Information None Patient's Medications Start Taking No medications on file Continue Taking ALLOPURINOL (ZYLOPRIM) 100 MG TABLET    Take 100 mg by mouth every Monday and Friday. AMLODIPINE (NORVASC) 5 MG TABLET    Take 5 mg by mouth daily. ASPIRIN DELAYED-RELEASE 81 MG TABLET    Take 81 mg by mouth daily. ATORVASTATIN (LIPITOR) 20 MG TABLET    Take 20 mg by mouth daily. DOCUSATE SODIUM (COLACE) 100 MG CAPSULE    Take 1 Cap by mouth two (2) times a day. ERGOCALCIFEROL (ERGOCALCIFEROL) 50,000 UNIT CAPSULE    TAKE 1 CAPSULE WEEKLY FELODIPINE (PLENDIL SR) 10 MG 24 HR TABLET    Take 1 Tab by mouth daily. FERROUS SULFATE (SLOW FE) 142 MG (45 MG IRON) ER TABLET    Take  by mouth Daily (before breakfast). FOLIC ACID/MULTIVIT-MIN/LUTEIN (CENTRUM SILVER PO)    Take 1 Tab by mouth daily. FUROSEMIDE (LASIX) 20 MG TABLET    Take 20 mg by mouth daily. HYDROCODONE-ACETAMINOPHEN (NORCO) 5-325 MG PER TABLET    Take 1 Tab by mouth every eight (8) hours as needed for Pain. LACTOBACILLUS SP. 50 BILLION CPU (BIO-K PLUS) 50 BILLION CELL -375 MG CAP CAPSULE    Take 1 Cap by mouth daily. LORATADINE (CLARITIN) 10 MG TABLET    Take 10 mg by mouth daily as needed for Allergies. OMEPRAZOLE DELAYED RELEASE (PRILOSEC D/R) 20 MG TABLET    Take 20 mg by mouth daily. Indications: gastroesophageal reflux disease QUETIAPINE (SEROQUEL) 25 MG TABLET    Take 25 mg by mouth nightly. STARCH, THICKENING, (THICK NOW) POWD    Take  by mouth. TAMSULOSIN (FLOMAX) 0.4 MG CAPSULE    Take 1 Cap by mouth daily. VITAMIN E (E GEMS) 1,000 UNIT CAPSULE    Take 1,000 Units by mouth daily. ZOLPIDEM (AMBIEN) 10 MG TABLET    Take  by mouth nightly as needed for Sleep. These Medications have changed No medications on file Stop Taking No medications on file Dictation disclaimer:  Please note that this dictation was completed with [employer], the computer voice recognition software. Quite often unanticipated grammatical, syntax, homophones, and other interpretive errors are inadvertently transcribed by the computer software. Please disregard these errors. Please excuse any errors that have escaped final proofreading.

## 2020-06-13 NOTE — ROUTINE PROCESS
TRANSFER - IN REPORT:Verbal report received from Hamzah polk RN(name) on [address]  being received from ED(unit) for routine progression of care Report consisted of patients Situation, Background, Assessment and  
Recommendations(SBAR). Information from the following report(s) SBAR, Kardex, ED Summary and Recent Results was reviewed with the receiving nurse. Opportunity for questions and clarification was provided. Assessment completed upon patients arrival to unit and care assumed.

## 2020-06-13 NOTE — H&P
Hospitalist Admission History and Physical 
 
NAME:  Elen Cazares :   1922 MRN:   [id number] PCP:  Silvia Prajapati MD 
Date/Time:  2020 4:38 PM 
 
Subjective: CHIEF COMPLAINT:  Increased work of breathing HISTORY OF PRESENT ILLNESS:    
Anabela Acevedo is a 80 y.o.  female who presents with increased work of breathing to some degree since 20 and then worsened since cardiology visit 2 days ago. On waking this morning family and primary caregiver with increased work of breathing sought care in ED. In ED, patient has been given lasix 20mg and started on a heparin drip. Family denies any black stools or dark red bleeding. Patient is unable to provide and ROS and information is mainly obtained from caregiver. Caregiver notes that patient easily gets restless in unfamiliar settings and on prior admission caregiver stayed with patient around the clock. Medications were reviewed extensively and continued per home regimen. Did discuss with nursing supervisor [facility] regarding family request to stay with patient due to significant restlessness in altered environment - ultimately nursing supervisor recommended to have sitter order placed and requested call to son Zuleima Ellison regarding this issue. Past Medical History:  
Diagnosis Date  Anemia NEC  Anxiety  Cataract   
 bilat  Colon cancer ([facility] [address]   CRI (chronic renal insufficiency)   CVA (cerebral infarction)   
 right facial droop  CVA (cerebral infarction)   
 left post corona radiata  Dementia ([facility] [address]  Diverticulosis  Edema  GERD (gastroesophageal reflux disease)  Hypercholesterolemia  Hypertension  Hypomagnesemia   Lipoma of neck   
 right  Orthostatic hypotension  RBBB (right bundle branch block)  Syncope   TIA (transient ischemic attack) mid  Past Surgical History:  
Procedure Laterality Date  ENDOSCOPY, COLON, DIAGNOSTIC  2006  HX BREAST BIOPSY  HX CATARACT REMOVAL  10/02  
 left  HX CATARACT REMOVAL  6/03  
 right  HX COLECTOMY  1999  
 right hemicolectomy  HX HYSTERECTOMY  HX LIPOMA RESECTION Social History Tobacco Use  Smoking status: Never Smoker  Smokeless tobacco: Never Used Substance Use Topics  Alcohol use: No  
  
 
History reviewed. No pertinent family history. Allergies Allergen Reactions  Haldol [Haloperidol Lactate] Other (comments)  Shellfish Derived Hives and Nausea and Vomiting Prior to Admission Medications Prescriptions Last Dose Informant Patient Reported? Taking? HYDROcodone-acetaminophen (Norco) 5-325 mg per tablet 6/12/2020 at Unknown time  Yes Yes Sig: Take 1 Tab by mouth every eight (8) hours as needed for Pain. Omeprazole delayed release (PRILOSEC D/R) 20 mg tablet 6/12/2020 at Unknown time  Yes Yes Sig: Take 20 mg by mouth daily. Indications: gastroesophageal reflux disease QUEtiapine (SEROqueL) 25 mg tablet 6/12/2020 at Unknown time  Yes Yes Sig: Take 25 mg by mouth nightly. allopurinol (ZYLOPRIM) 100 mg tablet 6/12/2020 at Unknown time  Yes Yes Sig: Take 100 mg by mouth every Monday and Friday. amLODIPine (NORVASC) 5 mg tablet 6/12/2020 at Unknown time  Yes Yes Sig: Take 5 mg by mouth daily. aspirin delayed-release 81 mg tablet 6/13/2020 at Unknown time  Yes Yes Sig: Take 81 mg by mouth daily. atorvastatin (LIPITOR) 20 mg tablet 6/12/2020 at Unknown time  Yes Yes Sig: Take 20 mg by mouth every evening. Indications: hardening of the arteries due to plaque buildup  
ergocalciferol (ERGOCALCIFEROL) 50,000 unit capsule 6/6/2020 at Unknown time  Yes Yes Sig: Take 50,000 Units by mouth every seven (7) days. Takes every monday  
ferrous sulfate (SLOW FE) 142 mg (45 mg iron) ER tablet 6/12/2020 at Unknown time  Yes Yes Sig: Take  by mouth Daily (before breakfast). folic acid/multivit-min/lutein (CENTRUM SILVER PO) 2020 at Unknown time  Yes Yes Sig: Take 1 Tab by mouth daily. furosemide (LASIX) 20 mg tablet 2020 at Unknown time  Yes Yes Sig: Take 20 mg by mouth daily. lactobacillus sp. 50 billion cpu (BIO-K PLUS) 50 billion cell -375 mg cap capsule 2020 at Unknown time  No Yes Sig: Take 1 Cap by mouth daily. loratadine (CLARITIN) 10 mg tablet Unknown at Unknown time  Yes No  
Sig: Take 10 mg by mouth daily as needed for Allergies. starch, thickening, (Thick Now) powd 2020 at Unknown time  Yes Yes Sig: Take  by mouth.  
vitamin e (E GEMS) 1,000 unit capsule 2020 at Unknown time  Yes Yes Sig: Take 1,000 Units by mouth daily. zolpidem (Ambien) 10 mg tablet 2020 at Unknown time  Yes Yes Sig: Take  by mouth nightly as needed for Sleep (takes every evening approx 7PM per family). Facility-Administered Medications: None REVIEW OF SYSTEMS:   
 [x] Unable to obtain  ROS due to  [x]mental status change / dementia  []sedated   []intubated []Total of 12 systems reviewed as follows: 
 
Objective: VITALS:   
Visit Vitals /52 Pulse 80 Temp 98.4 °F (36.9 °C) Resp 18 Wt 64 kg (141 lb) SpO2 97% BMI 28.48 kg/m² Temp (24hrs), Av.4 °F (36.9 °C), Min:98.4 °F (36.9 °C), Max:98.4 °F (36.9 °C) PHYSICAL EXAM:  
General:          Alert, in NAD, frail appearing / restless and uncomfortable momentarily during coughing episode / very VIRAL [facility] HEENT:           Sclera anicteric. Conjunctiva pink. Mucous membranes moist 
Neck:               Supple. Trachea midline.  + mild tachypnea; no jugular venous distention, no carotid bruit CV:                  Regular rate and rhythm. S1S2+ Lungs:             very poor movement to BLL / poor inspiratory effort / mild tachypnea Abdomen:        Soft, non-tender. Not distended. Bowel sounds normal.  
Extremities:     No cyanosis. No edema. extremities warm Neurologic:     responds to voice only. Follows very simple commands from caregiver, responds appropriately. No focal neurological deficit was noted Skin:                Warm and dry. No rashes. LAB DATA REVIEWED:   
No components found for: [place] Recent Labs  
  06/13/20 
1305   
K 5.3  CO2 24 BUN 30* CREA 1.97* * CA 9.2 ALB 3.0* WBC 7.1 HGB 9.5* HCT 29.8*  IMAGING RESULTS: 
 
 [x]  I have personally reviewed the actual   [x]CXR  []CT scan Assessment/Plan: Active Problems: 
  Congestive heart failure (CHF) ([facility] [address] (6/13/2020) 
 
  
___________________________________________________ PLAN:   
1. Acute on chronic diastolic CHF - cont low dose lasix, dosing discussed with cardiology Dr. Cristiano Lozano / strict I&Os / recheck limited echo 2. Elevated troponin concern for CHF - start heparin drip / trend cardiac enzymes / cardiology following / add BB in discussion with cardiology / IF evidence of discomfort could initiate low dose ntg drip per cardiology / discussed with son likelihood for conservative management recommendations given age and co-morbidities 3. TEODORO on CKD stage 4 - follow closely in setting of diuretics / previously seen by Dr. Macrina Biswas on last admission 4. Dementia with behavioral disturbances - cont home regimen of seroquel / Cassie Jermaine / norco as these are her home medications / family in conversations with nursing supervisor about ? Ability for caregiver Sharon to stay with patient - have requested nursing supervisor call family and then relay to nursing staff. Ativan PRN / sitter / fall risk 5. Dysphagia - cont home diet per caregiver soft foods - nectar thick / SLP  consulted / aspiration precautions 6. S/p right hip fracture with R hip hemiarthroplasty on 03/01/2020 - per Dr. Katherine Guthrie / takes nightly pain medication 7. Goals of care - Discussed with son Kelly Randolph, confirmed patient is a DNR/DNI per prior conversation and as per POST form. Risk of deterioration:  []Low    []Moderate  [x]High Prophylaxis:  []Lovenox  []Coumadin  [x]Hep drip  []SCDs  []H2B/PPI Disposition:  []Home w/ Family   [x]HH PT,OT,RN   []SNF/LTC   []SAH/Rehab Discussed Code Status:    []Full Code      [x]DNR    
___________________________________________________ Care Plan discussed with: 
  [x]Patient   [x]Family   []ED Care Manager  [x]ED Doc   [x]Specialist : Cardiology  
___________________________________________________ Admitting Provider: Inés Saez NP

## 2020-06-14 NOTE — PROGRESS NOTES
Problem: Falls - Risk of 
Goal: *Absence of Falls Description: Document Stiven Mercer Fall Risk and appropriate interventions in the flowsheet. Outcome: Progressing Towards Goal 
Note: Fall Risk Interventions: 
Mobility Interventions: Bed/chair exit alarm Mentation Interventions: Bed/chair exit alarm, Door open when patient unattended, Room close to nurse's station Medication Interventions: Bed/chair exit alarm Elimination Interventions: Bed/chair exit alarm History of Falls Interventions: Bed/chair exit alarm, Door open when patient unattended, Room close to nurse's station Problem: Pressure Injury - Risk of 
Goal: *Prevention of pressure injury Description: Document Shay Scale and appropriate interventions in the flowsheet. Outcome: Progressing Towards Goal 
Note: Pressure Injury Interventions: 
  
 
Moisture Interventions: Absorbent underpads, Check for incontinence Q2 hours and as needed Activity Interventions: Assess need for specialty bed Mobility Interventions: Assess need for specialty bed Nutrition Interventions: Document food/fluid/supplement intake, Offer support with meals,snacks and hydration Friction and Shear Interventions: HOB 30 degrees or less, Minimize layers Problem: Pain Goal: *Control of Pain Outcome: Progressing Towards Goal 
  
Problem: Heart Failure: Day 1 Goal: Off Pathway (Use only if patient is Off Pathway) Outcome: Progressing Towards Goal 
Goal: Activity/Safety Outcome: Progressing Towards Goal 
Goal: Consults, if ordered Outcome: Progressing Towards Goal 
Goal: Diagnostic Test/Procedures Outcome: Progressing Towards Goal 
Goal: Nutrition/Diet Outcome: Progressing Towards Goal 
Goal: Discharge Planning Outcome: Progressing Towards Goal 
Goal: Medications Outcome: Progressing Towards Goal 
Goal: Respiratory Outcome: Progressing Towards Goal 
Goal: Treatments/Interventions/Procedures Outcome: Progressing Towards Goal 
 Goal: Psychosocial 
Outcome: Progressing Towards Goal 
Goal: *Oxygen saturation within defined limits Outcome: Progressing Towards Goal 
Goal: *Hemodynamically stable Outcome: Progressing Towards Goal 
Goal: *Optimal pain control at patient's stated goal 
Outcome: Progressing Towards Goal 
Goal: *Anxiety reduced or absent Outcome: Progressing Towards Goal

## 2020-06-14 NOTE — ROUTINE PROCESS
Bedside shift change report given to [employer] (oncoming nurse) by Sue Walters (offgoing nurse). Report included the following information SBAR, Kardex, Intake/Output, MAR and Recent Results.

## 2020-06-14 NOTE — PROGRESS NOTES
Noted STAT K ordered Medically treated Poor intake , intravascularly appears on dry side ECHO pending High BNP appears in setting of NSTMI ,recheck BNP Does have HFpEF , CXR with cookie opacities , no effusions Katerin Jerome Holding any lasix Add gentle hydration with hypotonic bicarb gtt Please call with questions, 
 
Casie Mendez MD FASN Cell [phone] Pager: [phone]

## 2020-06-14 NOTE — PROGRESS NOTES
Speech Pathology Note 11:23- 11:40: Acknowledged new order for swallow eval, chart reviewed, spoke with personal aid. Pt known to this SLP from previous hospitalizations. Per personal aid, pt on nectar thick liquids at home and puree solids. Pt resting with eyes closed when SLP entered room, lifted head off of the pillow with elevation of HOB but kept eyes closed. Pt, with cues, opened mouth and accepted ice chip. Pt with weak labial seal for a few seconds and no oral manipulation/ lingual movement prior to anterior spillage of melted portion, remainder of ice chip removed by SLP. Pt without additional labial opening or seal to presentations via tsp. Unable to assess oropharyngeal skills at this time. Rec: continue puree diet with nectar thick liquids, aspiration precautions, feeder. SLP will continue to follow and complete b/s eval as able. Thank you for this referral, 
Ozzie Cali MS, CCC/SLP Speech- Language Pathologist

## 2020-06-14 NOTE — CONSULTS
Consult Note Consult requested by: Andrea Coles MD 
 
ADMIT DATE: 6/13/2020 CONSULT DATE: June 14, 2020 Admission diagnosis:  Respiratory distress Reason for Nephrology Consultation: TEODORO Assessment /Plan: 
#1 nonoliguric TEODORO on chronic kidney disease stage IV( baseline ~ 1.8-2) #2 Ac respiratory failure #2 hyperkalemia #3 NSTMI , cards following #4 hypertension #5 recentl  right hip fracture s/p surg #6 NG metabolic acidosis with non-anion gap, resolved #9 HFpEF #9 advanced dementia/delirium Plan: 
#1 gentle hypotonic bicarb gtt @ 50 cc/hrs X 12 hrs #2 holding diuresis #3 medically manage hyperk, recheck STAT #4 avoid NSAID ,nephrotoxins , IV contrast 
 
DNR 
with advanced age and dementia not a longterm dialysis 
candidate Please call with questions, 
  
Freddie Del Rosario MD [facility] Cell [phone] Pager: [phone] HPI: 
Patient is a 49-year-old -American female with history of history of heart failure with preserved EF, recent hip fracture, hypertension, dementia who presented with shortness of breath. On presentation patient was noted to have NSTEMI was started on heparin drip and given 20 mg of IV Lasix. Overnight patient did not diurese much but her labs including renal functions and liver enzymes got worse. Continues to be short of breath. Chest x-ray does not show significant amount of effusions or pulmonary edema but does show patchy opacities. This morning patient appears to be sleepy, has had poor intake over the last several days. Urine output also has been marginal overnight. Nephrology consulted for TEODORO. Past Medical History:  
Diagnosis Date  Anemia NEC  Anxiety  Cataract   
 bilat  Colon cancer ([address] 1999  CRI (chronic renal insufficiency) 4/07  CVA (cerebral infarction) 4/01  
 right facial droop  CVA (cerebral infarction) 2/11  
 left post corona radiata  Dementia ([address]  Diverticulosis  Edema  GERD (gastroesophageal reflux disease)  Hypercholesterolemia  Hypertension  Hypomagnesemia 2/11  Lipoma of neck   
 right  Orthostatic hypotension  RBBB (right bundle branch block)  Syncope 6/08  TIA (transient ischemic attack) mid 1990's Past Surgical History:  
Procedure Laterality Date  ENDOSCOPY, COLON, DIAGNOSTIC  2006  HX BREAST BIOPSY  HX CATARACT REMOVAL  10/02  
 left  HX CATARACT REMOVAL  6/03  
 right  HX COLECTOMY  1999  
 right hemicolectomy  HX HYSTERECTOMY  HX LIPOMA RESECTION Social History Socioeconomic History  Marital status:  Spouse name: Not on file  Number of children: Not on file  Years of education: Not on file  Highest education level: Not on file Occupational History  Not on file Social Needs  Financial resource strain: Not on file  Food insecurity Worry: Not on file Inability: Not on file  Transportation needs Medical: Not on file Non-medical: Not on file Tobacco Use  Smoking status: Never Smoker  Smokeless tobacco: Never Used Substance and Sexual Activity  Alcohol use: No  
 Drug use: Not on file  Sexual activity: Never Lifestyle  Physical activity Days per week: Not on file Minutes per session: Not on file  Stress: Not on file Relationships  Social connections Talks on phone: Not on file Gets together: Not on file Attends [employer] service: Not on file Active member of club or organization: Not on file Attends meetings of clubs or organizations: Not on file Relationship status: Not on file  Intimate partner violence Fear of current or ex partner: Not on file Emotionally abused: Not on file Physically abused: Not on file Forced sexual activity: Not on file Other Topics Concern  Not on file Social History Narrative  Not on file History reviewed. No pertinent family history. Allergies Allergen Reactions  Haldol [Haloperidol Lactate] Other (comments)  Shellfish Derived Hives and Nausea and Vomiting Home Medications:  
 
Medications Prior to Admission Medication Sig  
 starch, thickening, (Thick Now) powd Take  by mouth.  amLODIPine (NORVASC) 5 mg tablet Take 5 mg by mouth daily.  HYDROcodone-acetaminophen (Norco) 5-325 mg per tablet Take 1 Tab by mouth every eight (8) hours as needed for Pain.  QUEtiapine (SEROqueL) 25 mg tablet Take 25 mg by mouth nightly.  zolpidem (Ambien) 10 mg tablet Take  by mouth nightly as needed for Sleep (takes every evening approx 7PM per family).  furosemide (LASIX) 20 mg tablet Take 20 mg by mouth daily.  Omeprazole delayed release (PRILOSEC D/R) 20 mg tablet Take 20 mg by mouth daily. Indications: gastroesophageal reflux disease  lactobacillus sp. 50 billion cpu (BIO-K PLUS) 50 billion cell -375 mg cap capsule Take 1 Cap by mouth daily.  ergocalciferol (ERGOCALCIFEROL) 50,000 unit capsule Take 50,000 Units by mouth every seven (7) days. Takes every [employer]  vitamin e (E GEMS) 1,000 unit capsule Take 1,000 Units by mouth daily.  ferrous sulfate (SLOW FE) 142 mg (45 mg iron) ER tablet Take  by mouth Daily (before breakfast).  folic acid/multivit-min/lutein (CENTRUM SILVER PO) Take 1 Tab by mouth daily.  allopurinol (ZYLOPRIM) 100 mg tablet Take 100 mg by mouth every Monday and Friday.  atorvastatin (LIPITOR) 20 mg tablet Take 20 mg by mouth every evening. Indications: hardening of the arteries due to plaque buildup  aspirin delayed-release 81 mg tablet Take 81 mg by mouth daily.  loratadine (CLARITIN) 10 mg tablet Take 10 mg by mouth daily as needed for Allergies. Current Inpatient Medications:  
 
Current Facility-Administered Medications Medication Dose Route Frequency  sodium chloride (NS) flush 5-40 mL  5-40 mL IntraVENous Q8H  
  sodium chloride (NS) flush 5-40 mL  5-40 mL IntraVENous PRN  
 aspirin chewable tablet 81 mg  81 mg Oral DAILY  sodium chloride (NS) flush 5-40 mL  5-40 mL IntraVENous Q8H  
 sodium chloride (NS) flush 5-40 mL  5-40 mL IntraVENous PRN  
 sodium bicarbonate (8.4%) 100 mEq in dextrose 5% 1,000 mL infusion   IntraVENous CONTINUOUS  
 heparin 25,000 units in D5W 250 ml infusion  12-25 Units/kg/hr IntraVENous TITRATE  [Held by provider] furosemide (LASIX) injection 20 mg  20 mg IntraVENous DAILY  [START ON 6/15/2020] allopurinoL (ZYLOPRIM) tablet 100 mg  100 mg Oral EVERY MON & FRI  
 [Held by provider] atorvastatin (LIPITOR) tablet 20 mg  20 mg Oral QPM  
 ferrous sulfate tablet   Oral ACB  therapeutic multivitamin (THERAGRAN) tablet 1 Tab  1 Tab Oral DAILY  [Held by provider] HYDROcodone-acetaminophen (NORCO) 5-325 mg per tablet 1 Tab  1 Tab Oral QPM  
 lactobacillus sp. 50 billion cpu (BIO-K PLUS) capsule 1 Cap  1 Cap Oral DAILY  pantoprazole (PROTONIX) tablet 40 mg  40 mg Oral ACB  amLODIPine (NORVASC) tablet 5 mg  5 mg Oral DAILY  QUEtiapine (SEROquel) tablet 25 mg  25 mg Oral QHS  [Held by provider] zolpidem (AMBIEN) tablet 10 mg  10 mg Oral QHS  [Held by provider] metoprolol tartrate (LOPRESSOR) tablet 25 mg  25 mg Oral Q12H Review of Systems:  
Unable to obtain as sleepy/confused Physical Assessment:  
 
Vitals:  
 06/14/20 0022 06/14/20 0424 06/14/20 0800 06/14/20 1559 BP: 106/69 113/45 93/52 (!) 130/96 Pulse: 71 61 60 Resp: 19 21 19 Temp: 97.4 °F (36.3 °C) 97.8 °F (36.6 °C) 97.7 °F (36.5 °C) SpO2: 95% 95% 96% Weight:  63 kg (139 lb)  64 kg (141 lb) Height:    4' 11\" (1.499 m) Last 3 Recorded Weights in this Encounter 06/13/20 1145 06/14/20 0424 06/14/20 3225 Weight: 64 kg (141 lb) 63 kg (139 lb) 64 kg (141 lb) Admission weight: Weight: 64 kg (141 lb) (06/13/20 1145) No intake or output data in the 24 hours ending 06/14/20 1250 Patient is in no apparent distress. HEENT: dry mucosa Lungs: good air entry, clear to auscultation bilaterally. Cardiovascular system: S1, S2, regular rate and rhythm. Abdomen: soft, non tender, non distended. Extremities: no edema Neurologic: sleepy/confused Data Review: 
 
Labs: Results:  
   
Chemistry Recent Labs  
  06/14/20 
1104 06/14/20 
0550 06/13/20 
1305 GLU  --  112* 122* NA  --  142 140  
K 7.7* 6.5* 5.3 CL  --  108 108 CO2  --  18* 24 BUN  --  43* 30* CREA  --  3.22* 1.97* CA  --  8.7 9.2 AGAP  --  16 8 BUCR  --  13 15 AP  --  89 85  
TP  --  6.8 7.8 ALB  --  2.8* 3.0*  
GLOB  --  4.0 4.8* AGRAT  --  0.7* 0.6* CBC w/Diff Recent Labs  
  06/14/20 
0550 06/13/20 
1305 WBC 14.8* 7.1  
RBC 3.65* 3.73* HGB 9.1* 9.5* HCT 29.2* 29.8*  300 GRANS 74 78* LYMPH 17* 12* EOS 0 0 Iron/Ferritin No results for input(s): IRON in the last 72 hours. No lab exists for component: TIBCCALC  
PTH/VIT D No results for input(s): PTH in the last 72 hours. No lab exists for component: VITD Donald Mckeon MD 
6/14/2020 
12:50 PM 
 
 
June 14, 2020

## 2020-06-14 NOTE — PROGRESS NOTES
K was 7.6 suspect hemolysis Kayexalate  
glucose insulin Bicarb Albuterol neb Please call with questions Teresa Jorge MD F[facility] Cell [phone] Pager: [phone]

## 2020-06-14 NOTE — PROGRESS NOTES
[facility] Hospitalist Group Progress Note Patient: Chris Armas Age: 80 y.o. : 1922 MR#: [id number] SSN: [id number] Date: 2020 Subjective:  
 
Pt non-verbal - no ROS available Assessment/Plan: 1. Elevated troponin concern for ACS- cont heparin drip / cardiology appreciated / likely poor candidate for interventions 2. Elevated BNP w/o clear evidence of CHF exacerbation - hold lasix in setting of elevated creatinine / strict I&Os thus far no urine output - bladder check with minimal volume (25ml) 3. TEODORO on CKD stage 4 - Hold lasix / nephrology consulted 4. Hyperkalemia - tx with bicarb / insulin / albuterol 5. Sinus bradycardia likely d/t hyperkalemia- new onset since 1AM this morning / cont tele 6. Severely elevated LFT's potentially from #1 - hold statin / d/c ativan 7. Dementia with behavioral disturbances - hold sedating medications / family / caregiver to visit / may need restraints if restless / try to keep awake during daytime as able 8. Dysphagia - cont home diet per caregiver soft foods - nectar thick / SLP  consulted / aspiration precautions 9. S/p right hip fracture with R hip hemiarthroplasty on 2020 - per Dr. Tulio Madison / takes nightly pain medication 10. Goals of care - Patient is a DNR/DNI, palliative medicine consulted Additional Notes:   
 
Case discussed with:  [x]Patient  [x]Family  [x]Nursing  []Case Management DVT Prophylaxis:  []Lovenox  []Hep  []SCDs  []Coumadin   [x]On Heparin gtt Objective:  
VS:  
Visit Vitals BP (!) 130/96 Pulse 60 Temp 97.7 °F (36.5 °C) Resp 19 Ht 4' 11\" (1.499 m) Wt 64 kg (141 lb) SpO2 96% BMI 28.48 kg/m² Tmax/24hrs: Temp (24hrs), Av.8 °F (36.6 °C), Min:97.4 °F (36.3 °C), Max:98.4 °F (36.9 °C) No intake or output data in the 24 hours ending 20 1000 General:  Mild restlessness with stimulation , very poorly responsive and non-verbal today Cardiovascular:  Reg rhythm; bradycardia Pulmonary:  LSC throughout, normal resp effort GI:  +BS in all four quadrants, soft, non-tender Extremities:  No edema; 2+ dorsalis pedis pulses bilaterally Neuro: oriented x 0 Family contact: Gabe Carter [phone] Labs:   
Recent Results (from the past 24 hour(s)) CBC WITH AUTOMATED DIFF Collection Time: 06/13/20  1:05 PM  
Result Value Ref Range WBC 7.1 4.6 - 13.2 K/uL  
 RBC 3.73 (L) 4.20 - 5.30 M/uL HGB 9.5 (L) 12.0 - 16.0 g/dL HCT 29.8 (L) 35.0 - 45.0 % MCV 79.9 74.0 - 97.0 FL  
 MCH 25.5 24.0 - 34.0 PG  
 MCHC 31.9 31.0 - 37.0 g/dL  
 RDW 15.8 (H) 11.6 - 14.5 % PLATELET 991 568 - 971 K/uL MPV 10.0 9.2 - 11.8 FL  
 NEUTROPHILS 78 (H) 40 - 73 % LYMPHOCYTES 12 (L) 21 - 52 % MONOCYTES 10 3 - 10 % EOSINOPHILS 0 0 - 5 % BASOPHILS 0 0 - 2 %  
 ABS. NEUTROPHILS 5.6 1.8 - 8.0 K/UL  
 ABS. LYMPHOCYTES 0.8 (L) 0.9 - 3.6 K/UL  
 ABS. MONOCYTES 0.7 0.05 - 1.2 K/UL  
 ABS. EOSINOPHILS 0.0 0.0 - 0.4 K/UL  
 ABS. BASOPHILS 0.0 0.0 - 0.1 K/UL  
 DF AUTOMATED METABOLIC PANEL, COMPREHENSIVE Collection Time: 06/13/20  1:05 PM  
Result Value Ref Range Sodium 140 136 - 145 mmol/L Potassium 5.3 3.5 - 5.5 mmol/L Chloride 108 100 - 111 mmol/L  
 CO2 24 21 - 32 mmol/L Anion gap 8 3.0 - 18 mmol/L Glucose 122 (H) 74 - 99 mg/dL BUN 30 (H) 7.0 - 18 MG/DL Creatinine 1.97 (H) 0.6 - 1.3 MG/DL  
 BUN/Creatinine ratio 15 12 - 20 GFR est AA 28 (L) >60 ml/min/1.73m2 GFR est non-AA 23 (L) >60 ml/min/1.73m2 Calcium 9.2 8.5 - 10.1 MG/DL Bilirubin, total 0.9 0.2 - 1.0 MG/DL  
 ALT (SGPT) 42 13 - 56 U/L  
 AST (SGOT) 60 (H) 10 - 38 U/L Alk. phosphatase 85 45 - 117 U/L Protein, total 7.8 6.4 - 8.2 g/dL Albumin 3.0 (L) 3.4 - 5.0 g/dL Globulin 4.8 (H) 2.0 - 4.0 g/dL A-G Ratio 0.6 (L) 0.8 - 1.7    
TROPONIN I Collection Time: 06/13/20  1:05 PM  
Result Value Ref Range Troponin-I, QT 1.57 (HH) 0.0 - 0.045 NG/ML  
NT-PRO BNP Collection Time: 06/13/20  1:05 PM  
Result Value Ref Range NT pro-,050 (H) 0 - 1,800 PG/ML  
EKG, 12 LEAD, INITIAL Collection Time: 06/13/20  2:14 PM  
Result Value Ref Range Ventricular Rate 79 BPM  
 Atrial Rate 41 BPM  
 QRS Duration 110 ms  
 Q-T Interval 394 ms QTC Calculation (Bezet) 451 ms Calculated R Axis 80 degrees Calculated T Axis -45 degrees Diagnosis Accelerated Junctional rhythm Incomplete right bundle branch block ST & T wave abnormality, consider lateral ischemia Abnormal ECG When compared with ECG of 29-FEB-2020 12:01, 
Junctional rhythm has replaced Sinus rhythm T wave inversion less evident in Lateral leads PTT Collection Time: 06/13/20  3:35 PM  
Result Value Ref Range aPTT 31.9 23.0 - 36.4 SEC CARDIAC PANEL,(CK, CKMB & TROPONIN) Collection Time: 06/13/20  9:15 PM  
Result Value Ref Range CK - MB 3.0 <3.6 ng/ml CK-MB Index 4.6 (H) 0.0 - 4.0 % CK 65 26 - 192 U/L Troponin-I, QT 1.92 (HH) 0.0 - 0.045 NG/ML  
PTT Collection Time: 06/13/20 10:52 PM  
Result Value Ref Range aPTT 144.4 (H) 23.0 - 36.4 SEC  
CBC WITH AUTOMATED DIFF Collection Time: 06/14/20  5:50 AM  
Result Value Ref Range WBC 14.8 (H) 4.6 - 13.2 K/uL  
 RBC 3.65 (L) 4.20 - 5.30 M/uL HGB 9.1 (L) 12.0 - 16.0 g/dL HCT 29.2 (L) 35.0 - 45.0 % MCV 80.0 74.0 - 97.0 FL  
 MCH 24.9 24.0 - 34.0 PG  
 MCHC 31.2 31.0 - 37.0 g/dL  
 RDW 15.9 (H) 11.6 - 14.5 % PLATELET 233 120 - 515 K/uL MPV 10.5 9.2 - 11.8 FL  
 NEUTROPHILS 74 42 - 75 % LYMPHOCYTES 17 (L) 20 - 51 % MONOCYTES 9 2 - 9 % EOSINOPHILS 0 0 - 5 % BASOPHILS 0 0 - 3 %  
 ABS. NEUTROPHILS 11.0 (H) 1.8 - 8.0 K/UL  
 ABS. LYMPHOCYTES 2.5 0.8 - 3.5 K/UL  
 ABS. MONOCYTES 1.3 (H) 0 - 1.0 K/UL  
 ABS. EOSINOPHILS 0.0 0.0 - 0.4 K/UL  
 ABS. BASOPHILS 0.0 0.0 - 0.06 K/UL  
 DF MANUAL PLATELET COMMENTS ADEQUATE PLATELETS RBC COMMENTS ANISOCYTOSIS 1+ 
    
 RBC COMMENTS POLYCHROMASIA 1+ MAGNESIUM Collection Time: 06/14/20  5:50 AM  
Result Value Ref Range Magnesium 2.6 1.6 - 2.6 mg/dL METABOLIC PANEL, COMPREHENSIVE Collection Time: 06/14/20  5:50 AM  
Result Value Ref Range Sodium 142 136 - 145 mmol/L Potassium 6.5 (HH) 3.5 - 5.5 mmol/L Chloride 108 100 - 111 mmol/L  
 CO2 18 (L) 21 - 32 mmol/L Anion gap 16 3.0 - 18 mmol/L Glucose 112 (H) 74 - 99 mg/dL BUN 43 (H) 7.0 - 18 MG/DL Creatinine 3.22 (H) 0.6 - 1.3 MG/DL  
 BUN/Creatinine ratio 13 12 - 20 GFR est AA 16 (L) >60 ml/min/1.73m2 GFR est non-AA 13 (L) >60 ml/min/1.73m2 Calcium 8.7 8.5 - 10.1 MG/DL Bilirubin, total 1.2 (H) 0.2 - 1.0 MG/DL  
 ALT (SGPT) 544 (H) 13 - 56 U/L  
 AST (SGOT) 1,131 (H) 10 - 38 U/L Alk. phosphatase 89 45 - 117 U/L Protein, total 6.8 6.4 - 8.2 g/dL Albumin 2.8 (L) 3.4 - 5.0 g/dL Globulin 4.0 2.0 - 4.0 g/dL A-G Ratio 0.7 (L) 0.8 - 1.7 PTT Collection Time: 06/14/20  5:50 AM  
Result Value Ref Range aPTT 92.3 (H) 23.0 - 36.4 SEC CARDIAC PANEL,(CK, CKMB & TROPONIN) Collection Time: 06/14/20  5:50 AM  
Result Value Ref Range CK - MB 2.5 <3.6 ng/ml CK-MB Index 2.9 0.0 - 4.0 % CK 85 26 - 192 U/L Troponin-I, QT 1.61 (HH) 0.0 - 0.045 NG/ML Signed By: Parveen Miller NP   
 June 14, 2020

## 2020-06-14 NOTE — PROGRESS NOTES
Echocardiogram completed at bedside. Report to follow.  
 
[address] [facility]

## 2020-06-14 NOTE — ROUTINE PROCESS
Md called due to patient not voiding. md said to straight cath after bladder scan  if  Patient has over 200cc 
 
2000 - pt bladder scan with no urine. Pt had 2 bowel movements mixed with stool. Urine and stool with foul smell.

## 2020-06-14 NOTE — CONSULTS
[facility] - Consult Note Date of  Admission: 6/13/2020 11:50 AM  
Primary Care Physician:  Loyda Mc MD 
 
 Assessment:  
 
Patient Active Problem List  
Diagnosis Code  Dementia ([facility] F03.90  
 HTN (hypertension) I10  
 CRI (chronic renal insufficiency) N18.9  Dyslipidemia E78.5  Acute CHF (congestive heart failure) ([facility]) I50.9  CHF exacerbation ([facility]) I50.9  UTI (urinary tract infection) N39.0  Altered mental status R41.82  Dyspnea R06.00  
 CHF, acute on chronic ([facility]) I50.9  Hyperkalemia E87.5  Chronic kidney disease N18.9  Elevated troponin R79.89  Closed right hip fracture, initial encounter ([facility] S72.001A  Congestive heart failure (CHF) ([facility]) I50.9  
 
- HFpEF, presented with respiratory distress, ,050. CXR Left lower lung opacity, small pleural effusion,Small right pleural effusion versus thickening - Elevated Troponin 1.57-->1.92-->1.61. EKG with RBBB, accelerated junctional rhythm, non-specific ST-T wave changes - Echo 03/02/20 EF 55-60%, mild concentric hypertrophy, no regional wall motion abnormality.  
- TEODORO on CKD - Elevated AST/ALT 
- Hyperkalemia - Dysphagia - S/p right hip fracture with R hip hemiarthroplasty on 03/01/2020  
- Alzheimer Dementia - Advanced age - Chronic anemia - DNR status Plan: - IV Lasix on hold due to creatinine 3.22. Significant rise in comparison to yesterday. Has received one dose so far since hospital admission. Nephrology consulted by hospitalist team. 
- Heparin infusion for elevated Troponin. Would continue following enzyme trend. Conservative management recommended due to dementia, advanced age and DNR status. Will order ASA. Statin on hold due to elevated LFT's. Beta blocker on hold due to bradycardia 
- Echo completed, report to follow - Treatment of hyperkalemia per primary team/nephrology. Insulin given this AM. History of Present Illness: This is a 80 y.o. female admitted for Congestive heart failure (CHF) ([facility] [I50.9]. Patient complains of:  Seen in consult for elevated Trop and CHF, patient unable to provide history. HPI is taken from medical record due to patient history of dementia. Per H&P:  
 
\"Zainab is a 80 y.o.  female who presents with increased work of breathing to some degree since 06/09/20 and then worsened since cardiology visit 2 days ago. On waking this morning family and primary caregiver with increased work of breathing sought care in ED. In ED, patient has been given lasix 20mg and started on a heparin drip. Family denies any black stools or dark red bleeding.   
  
Patient is unable to provide and ROS and information is mainly obtained from caregiver. Caregiver notes that patient easily gets restless in unfamiliar settings and on prior admission caregiver stayed with patient around the clock. Medications were reviewed extensively and continued per home regimen. Did discuss with nursing supervisor Laurel Hernandez regarding family request to stay with patient due to significant restlessness in altered environment - ultimately nursing supervisor recommended to have sitter order placed and requested call to dipti Beckman regarding this issue. \" 
 
 
Cardiac risk factors: post-menopausal 
 
 
Review of systems not obtained due to patient factors. Past Medical History:  
 
Past Medical History:  
Diagnosis Date  Anemia NEC  Anxiety  Cataract   
 bilat  Colon cancer ([facility] 1999  CRI (chronic renal insufficiency) 4/07  CVA (cerebral infarction) 4/01  
 right facial droop  CVA (cerebral infarction) 2/11  
 left post corona radiata  Dementia ([facility]  Diverticulosis  Edema  GERD (gastroesophageal reflux disease)  Hypercholesterolemia  Hypertension  Hypomagnesemia 2/11  Lipoma of neck   
 right  Orthostatic hypotension  RBBB (right bundle branch block)  Syncope 6/08  TIA (transient ischemic attack) mid 1990's Social History:  
 
Social History Socioeconomic History  Marital status:  Spouse name: Not on file  Number of children: Not on file  Years of education: Not on file  Highest education level: Not on file Tobacco Use  Smoking status: Never Smoker  Smokeless tobacco: Never Used Substance and Sexual Activity  Alcohol use: No  
 Sexual activity: Never Family History:  
History reviewed. No pertinent family history. Medications: Allergies Allergen Reactions  Haldol [Haloperidol Lactate] Other (comments)  Shellfish Derived Hives and Nausea and Vomiting Current Facility-Administered Medications Medication Dose Route Frequency  sodium chloride (NS) flush 5-40 mL  5-40 mL IntraVENous Q8H  
 sodium chloride (NS) flush 5-40 mL  5-40 mL IntraVENous PRN  
 dextrose (D50W) injection syrg 25 g  25 g IntraVENous ONCE  
 insulin regular (NOVOLIN R, HUMULIN R) injection 10 Units  10 Units IntraVENous ONCE  
 albuterol CONCENTRATE 2.5mg/0.5 mL neb soln  10 mg Nebulization NOW  
 heparin 25,000 units in D5W 250 ml infusion  12-25 Units/kg/hr IntraVENous TITRATE  [Held by provider] furosemide (LASIX) injection 20 mg  20 mg IntraVENous DAILY  [START ON 6/15/2020] allopurinoL (ZYLOPRIM) tablet 100 mg  100 mg Oral EVERY MON & FRI  atorvastatin (LIPITOR) tablet 20 mg  20 mg Oral QPM  
 ferrous sulfate tablet   Oral ACB  therapeutic multivitamin (THERAGRAN) tablet 1 Tab  1 Tab Oral DAILY  HYDROcodone-acetaminophen (NORCO) 5-325 mg per tablet 1 Tab  1 Tab Oral QPM  
 lactobacillus sp. 50 billion cpu (BIO-K PLUS) capsule 1 Cap  1 Cap Oral DAILY  pantoprazole (PROTONIX) tablet 40 mg  40 mg Oral ACB  LORazepam (ATIVAN) injection 0.5 mg  0.5 mg IntraVENous Q4H PRN  
 amLODIPine (NORVASC) tablet 5 mg  5 mg Oral DAILY  QUEtiapine (SEROquel) tablet 25 mg  25 mg Oral QHS  zolpidem (AMBIEN) tablet 10 mg  10 mg Oral QHS  metoprolol tartrate (LOPRESSOR) tablet 25 mg  25 mg Oral Q12H Physical Exam:  
 
Visit Vitals BP (!) 130/96 Pulse (!) 109 Temp 97.5 °F (36.4 °C) Resp 21 Ht 4' 11\" (1.499 m) Wt 141 lb (64 kg) SpO2 96% BMI 28.48 kg/m² BP Readings from Last 3 Encounters:  
06/14/20 (!) 130/96  
06/11/20 142/58  
03/16/20 139/83 Pulse Readings from Last 3 Encounters:  
06/14/20 (!) 109  
06/11/20 73  
03/16/20 60 Wt Readings from Last 3 Encounters:  
06/14/20 141 lb (64 kg) 03/02/20 141 lb (64 kg) 01/08/20 133 lb 4.8 oz (60.5 kg) General:  no distress, somnolent, frail Neck:  nontender, no JVD Lungs:  Decreased breath sounds bilaterally Heart:  regular rate and rhythm, S1, S2 normal, no murmur, click, rub or gallop Abdomen:  abdomen is soft without significant tenderness, masses, organomegaly or guarding Extremities:  extremities normal, atraumatic, no cyanosis or edema Skin: Warm and dry. no hyperpigmentation, vitiligo, or suspicious lesions Neuro: no focal neurological deficits Psych: non focal 
 
 Data Review:  
 
Recent Labs  
  06/14/20 
0550 06/13/20 
1305 WBC 14.8* 7.1 HGB 9.1* 9.5* HCT 29.2* 29.8*  300 Recent Labs  
  06/14/20 
0550 06/13/20 
1305  140  
K 6.5* 5.3  108 CO2 18* 24 * 122* BUN 43* 30* CREA 3.22* 1.97* CA 8.7 9.2 MG 2.6  --   
ALB 2.8* 3.0* * 42 Results for orders placed or performed during the hospital encounter of 06/13/20 EKG, 12 LEAD, INITIAL Result Value Ref Range Ventricular Rate 79 BPM  
 Atrial Rate 41 BPM  
 QRS Duration 110 ms  
 Q-T Interval 394 ms QTC Calculation (Bezet) 451 ms Calculated R Axis 80 degrees Calculated T Axis -45 degrees Diagnosis Accelerated Junctional rhythm Incomplete right bundle branch block ST & T wave abnormality, consider lateral ischemia Abnormal ECG 
 When compared with ECG of 29-FEB-2020 12:01, 
Junctional rhythm has replaced Sinus rhythm T wave inversion less evident in Lateral leads Results for orders placed or performed in visit on 06/13/19 AMB POC EKG ROUTINE W/ 12 LEADS, INTER & REP Narrative Read by Orlando Jerry MD - Sinus bradycardia. RBBB and right axis - possible right ventricular hypertrophy. Consider pulmonary disease. T-abnormality. Possible anterolateral and inferior ischemia. All Cardiac Markers in the last 24 hours:   
Lab Results Component Value Date/Time CPK 85 06/14/2020 05:50 AM  
 CPK 65 06/13/2020 09:15 PM  
 CKMB 2.5 06/14/2020 05:50 AM  
 CKMB 3.0 06/13/2020 09:15 PM  
 CKND1 2.9 06/14/2020 05:50 AM  
 CKND1 4.6 (H) 06/13/2020 09:15 PM  
 TROIQ 1.61 (HH) 06/14/2020 05:50 AM  
 TROIQ 1.92 ([facility]) 06/13/2020 09:15 PM  
 TROIQ 1.57 ([facility]) 06/13/2020 01:05 PM  
 
 
Last Lipid:   
Lab Results Component Value Date/Time Cholesterol, total 123 06/06/2019 05:38 AM  
 HDL Cholesterol 62 (H) 06/06/2019 05:38 AM  
 LDL, calculated 44.8 06/06/2019 05:38 AM  
 Triglyceride 81 06/06/2019 05:38 AM  
 CHOL/HDL Ratio 2.0 06/06/2019 05:38 AM  
 
 
Signed By: Rodney Marcum PA-C June 14, 2020

## 2020-06-14 NOTE — ROUTINE PROCESS
Bedside and Verbal shift change report given to Chilo Esparza (oncoming nurse) by Camille Foreman (offgoing nurse). Report included the following information SBAR, Kardex and Recent Results.

## 2020-06-14 NOTE — PROGRESS NOTES
Called pt's son Saravanan Manning [employer] [facility]) to complete admission questions. Saravanan Manning states the pt has a caregiver (Sharon) who comes 7 days/week 12 hours/day who was with pt in ED. Saravanan Manning states permission was given for Sharon to come to unit with pt to keep her calm and assist with pt care but there was a miscommunication and Sharon was then told she could not come to the unit. Pt is currently confused, restless and undressing herself. Saravanan Manning states pt would benefit from Sharon being there and would like her to be able to come sit with pt tomorrow. Will pass along to nursing supervisor and AM nurse.

## 2020-06-14 NOTE — ROUTINE PROCESS
Pt more alert and moving arms and legs. Sitter at bedside. Patient had moderate Bm from kayexalate enema.

## 2020-06-15 NOTE — DISCHARGE SUMMARY
[facility] Discharge Summary Patient: Ralf Moscoso Age: 80 y.o. : 1922 MR#: [id number] SSN: [id number] PCP on record: Jemima Mcnulty MD 
Admit date: 2020 Discharge date: 6/15/2020 Consults:   
- Cardiology - Nephrology Procedures: 
-   None Significant Diagnostic Studies: -  Xr Chest [facility] Result Date: 2020 IMPRESSION: Left lower lung opacity consistent with left lower lobe atelectasis versus airspace disease in addition to small pleural effusion as discussed above. Small right pleural effusion versus thickening. Moderately enlarged cardiac silhouette, slightly larger compared to prior. ECHO ADULT LIMITED W DOPPLER & COLOR on 20 · Normal cavity size. Mild concentric hypertrophy. Moderate systolic function. Estimated left ventricular ejection fraction is 35 - 40%. Visually measured ejection fraction. Hypokinetic left ventricular wall motion. Left ventricular diastolic dysfunction. · Severely dilated left atrium. · Mild to moderate tricuspid valve regurgitation is present. · Pulmonary arterial systolic pressure is 24 mmHg. · New global systolic LV dysfunction compared to very recent study 3/2/2020. Diagnoses at time of death:                                          
Patient Active Problem List  
Diagnosis Code  Dementia ([facility] F03.90  
 HTN (hypertension) I10  
 CRI (chronic renal insufficiency) N18.9  Dyslipidemia E78.5  Acute CHF (congestive heart failure) ([facility]) I50.9  CHF exacerbation ([facility]) I50.9  UTI (urinary tract infection) N39.0  Altered mental status R41.82  Dyspnea R06.00  
 CHF, acute on chronic ([facility]) I50.9  Hyperkalemia E87.5  Chronic kidney disease N18.9  Elevated troponin R79.89  Closed right hip fracture, initial encounter ([facility] S72.001A  Congestive heart failure (CHF) ([facility]) I50.9 Hospital Course: Patient was admitted with concern for ACS and ? Fluid overload and started on a heparin drip and given lasix 20mg x 1. Patient was also noted to be severely demented and restless at times. The day after admission, patient developed worsening renal function, with poor urine output, persistent hyperkalemia despite multiple treatments, bradycardia and elevated LFTs ? R/t ischemic event for which patient was poor candidate for aggressive interventions. Discussed with family and caregiver at bedside that patient's life may be short despite best efforts. Ultimately, despite all interventions patient  early AM of 06/15/20. Disposition:  
 
Patient  in hospital 
 
Signed: 
Jordyn Jacobson NP 
6/15/2020 
8:00 AM

## 2020-06-15 NOTE — PROGRESS NOTES
responded to Death of  Roshni Pink, who was a 80 y.o.,female, The  provided the following Interventions: 
Provided crisis pastoral care, pastoral support and grief interventions. Offered prayers on behalf of the patient. Chart reviewed. Plan: 
Chaplains will continue to follow and will provide pastoral care on an as needed/requested basis and grief support for the family. [address] [place] [address]  
[phone]

## 2020-06-15 NOTE — PROGRESS NOTES
Called by nursing staff to pronounce patient. Patient identified as Caroline Vargas by KOBI hope on L wrist. 
Found to be lying in bed, mouth open. Identified myself  upon entry into room - no response was elicited. Patient not breathing, no air entry heard. No carotid or radial pulse palpable, no response to sternal rub. Pupils fixed. Death officially pronounced at 0313 hours, Scarlett 15, 2020. Patient's son, Charlie Watts, notified via phone.

## 2020-06-15 NOTE — ROUTINE PROCESS
Pt absent heart rate or breathing. No pulses palpable, no heart sounds noted. No response to sternal rub. Dr. Aba Elmore notified

## 2020-06-17 NOTE — CDMP QUERY
Due to confusing documentation of possible ACS, NSTEMI . Please clarify if patient was treated for :  
 
      NSTEMI Ruled in  
      NSTEMI Ruled out Other Explanation of clinical findings Clinically Undetermined (no explanation for clinical findings) The medical record reflects the following: 
 
   Risk Factors: CHF Clinical Indicators: Elevated troponins , EKG indicating lateral wall ischemia Treatment: Heparin IV , asa, statin Thank- you Edmund Horne RN CDI Cell [phone]

## 2025-03-22 NOTE — PROGRESS NOTES
NN contacted the patient by telephone to perform CHF follow Up. Spoke to pt's caretaker, Jose Gore. Noted Priorities/Plan:  Daily weights, return to baseline Daily Weight: 119.4 lbs Zone: green Signs/Symptoms: Edema none; SOB none; orthopnea none. Per caretaker, no issues or sx have been noted and patient is doing well. Goals  Attends follow-up appointments as directed. 06/12/19 Patient will attend all scheduled appointments through 09/12/19  Knowledge and adherence medication (ie. action, side effects, missed dose, etc.)   
  06/12/19 Pt will take all medications prescribed to be evaluated on each outreach through 09/12/19  Maintains daily weight.   
  06/12/19  Pt will weight themselves daily and log the results to be evaluated on each outreach through 09/12/19 Needs addressed from pathway:  
Week 1-4 Provide Daily Disease Management 
(NN initiated) ? Reviewed importance of Daily weight (Before Breakfast- 
Reviewed Daily Zone Identification (symptom management; weight, edema, SOB, activity/sleep changes)-notify provider immediately as indicated ? Reviewed Cardiac Low-sodium Diet and include carbohydrate controlled diet education ? Reviewed importance of Fluid restriction ? Comorbidity Management ? Confirmed follow-up appointments/transportation. Additional assessment ? Reinforced Fall precautions ? Activity tolerance assessment: at baseline ? Reviewed Energy conservation management and balancing activity with rest 
? Labs/diagnostics per MD office ? Medication Therapy: no issues identified ? Diet/appetite assessment: care taker states no issues ? Reviewed appropriate ED/Hospital utilization ? Immunizations not up to date ? Home assessment/modifications: no needs identified ? Pt denies transportation assistance needs ? Pt denies medication assistance needs ? Home health services are in place Psychosocial: Reassurance/emotional support Monitoring: ? Home health ? My Chart Education: 
? Reviewed Advanced care planning status ? Support system identification ( eg: Caregiver, meal planning, community resources, family, friends, Worship, support group) ? Health literacy for heart failure ? Caregiver education and preparation Have you been to an ER/Hospital since discharge from [facility]? No   
 
Have you followed up with PCP/Cardiologist/Specialist?  
19 Card appt w/ [facility] 19 PCP appt w/ Dr Yasir Jones S. - decreased valium to 2.5 mg qhs, home med list updated [employer]19 Card appt w/ Dr Rick Pal Transportation:  family/caretakers Diet: no salt/cardiac Activity/ADLs: self w/ assistance. Medications Reconciled at this time: yes Home health:  Company/Completion: [employer] Social Support: family/caretakers Advanced Care Plan: ACP and DDNR on file. Patient reminded that there is a physician on call 24 hours a day / 7 days a week (M-F 5pm to 8am and from Friday 5pm until Monday 8a for the weekend) should the patient have questions or concerns. Patient reminded to call 911 if situation is emergent or patient feels the situation is emergent. Pt verbalizes understanding. Clear

## (undated) DEVICE — 3M™ MEDIPORE™ H SOFT CLOTH SURGICAL TAPE 2864, 4 INCH X 10 YARD (10CM X 9,14M), 12 ROLLS/CASE: Brand: 3M™ MEDIPORE™

## (undated) DEVICE — GAUZE,SPONGE,4"X4",16PLY,STRL,LF,10/TRAY: Brand: MEDLINE

## (undated) DEVICE — X-RAY SPONGES,12 PLY: Brand: DERMACEA

## (undated) DEVICE — PACK PROCEDURE SURG TOT HIP BSHR LF

## (undated) DEVICE — SOLUTION IV 1000ML 0.9% SOD CHL

## (undated) DEVICE — SKIN MARKER,REGULAR TIP WITH RULER AND LABELS: Brand: DEVON

## (undated) DEVICE — STRYKER PERFORMANCE SERIES SAGITTAL BLADE: Brand: STRYKER PERFORMANCE SERIES

## (undated) DEVICE — Device

## (undated) DEVICE — SOLUTION IRRIG 3000ML 0.9% SOD CHL FLX CONT 0797208] ICU MEDICAL INC]

## (undated) DEVICE — FLEX ADVANTAGE 3000CC: Brand: FLEX ADVANTAGE

## (undated) DEVICE — INTENDED FOR TISSUE SEPARATION, AND OTHER PROCEDURES THAT REQUIRE A SHARP SURGICAL BLADE TO PUNCTURE OR CUT.: Brand: BARD-PARKER SAFETY BLADES SIZE 10, STERILE

## (undated) DEVICE — KIT CLN UP BON SECOURS MARYV

## (undated) DEVICE — PAD BD CONVOLUTED FOAM

## (undated) DEVICE — Z DISCONTINUED BY MEDLINE USE 2711682 TRAY SKIN PREP DRY W/ PREM GLV

## (undated) DEVICE — SUTURE VCRL SZ 2 L27IN ABSRB VLT L65MM TP-1 1/2 CIR J649G

## (undated) DEVICE — TAPE,CLOTH/SILK,CURAD,3"X10YD,LF,40/CS: Brand: CURAD

## (undated) DEVICE — HIP PILLOW, ABDUCTION: Brand: DEROYAL

## (undated) DEVICE — BLANKET WRM W29.9XL79.1IN UP BODY FORC AIR MISTRAL-AIR

## (undated) DEVICE — SUTURE VCRL SZ 1 L27IN ABSRB VLT CTX L48MM 1 2 CIR SGL ARMED J365H

## (undated) DEVICE — REM POLYHESIVE ADULT PATIENT RETURN ELECTRODE: Brand: VALLEYLAB

## (undated) DEVICE — HANDPIECE SET WITH HIGH FLOW TIP AND SUCTION TUBE: Brand: INTERPULSE

## (undated) DEVICE — LEG LENGTH/OFFSET MEASUREMENT INSTRUMENT

## (undated) DEVICE — SYR LR LCK 1ML GRAD NSAF 30ML --

## (undated) DEVICE — HEX-LOCKING BLADE ELECTRODE: Brand: EDGE

## (undated) DEVICE — PREP SKN CHLRAPRP 26ML TNT -- CONVERT TO ITEM 373320

## (undated) DEVICE — SUTURE MCRYL SZ 2-0 L36IN ABSRB UD L36MM CT-1 1/2 CIR Y945H